# Patient Record
Sex: FEMALE | Race: WHITE | HISPANIC OR LATINO | Employment: FULL TIME | URBAN - METROPOLITAN AREA
[De-identification: names, ages, dates, MRNs, and addresses within clinical notes are randomized per-mention and may not be internally consistent; named-entity substitution may affect disease eponyms.]

---

## 2017-01-23 ENCOUNTER — OFFICE VISIT (OUTPATIENT)
Dept: MEDICAL GROUP | Facility: CLINIC | Age: 27
End: 2017-01-23
Payer: COMMERCIAL

## 2017-01-23 VITALS
OXYGEN SATURATION: 95 % | HEART RATE: 80 BPM | DIASTOLIC BLOOD PRESSURE: 72 MMHG | HEIGHT: 62 IN | WEIGHT: 213 LBS | RESPIRATION RATE: 14 BRPM | BODY MASS INDEX: 39.2 KG/M2 | SYSTOLIC BLOOD PRESSURE: 122 MMHG | TEMPERATURE: 98.4 F

## 2017-01-23 DIAGNOSIS — S90.425A: ICD-10-CM

## 2017-01-23 PROCEDURE — 99213 OFFICE O/P EST LOW 20 MIN: CPT | Performed by: FAMILY MEDICINE

## 2017-01-23 NOTE — PROGRESS NOTES
CC: Blister of toe    HPI:   Minnie presents today with the following.    1. Blister of toe without infection, left, initial encounter  She noticed a blister on Friday. She was walking a little more than usual. Not exactly sure how this began. There was a lot of pressure from the blister. Friday evening the pressure became very uncomfortable and she went ahead and open the blister using a needle and then peeled off the dead skin. Discussed with her that in the future it would be much better to leave the dead skin on as a natural bandage. She had a little bit of milky fluid. The next day there was even more milky fluid. Today the fluid is more clear that the area is still quite tender. She notes no other blisters.      Patient Active Problem List    Diagnosis Date Noted   • Thyromegaly 12/20/2016   • Tension headache 12/20/2016   • Obesity (BMI 35.0-39.9 without comorbidity) ([facility]) 09/29/2016   • Left-sided low back pain with left-sided sciatica 10/02/2015   • Seasonal allergies 09/26/2014   • PCOS (Polycystic Ovarian Syndrome) 12/15/2009       Current Outpatient Prescriptions   Medication Sig Dispense Refill   • tizanidine (ZANAFLEX) 4 MG Tab Take 1 Tab by mouth every bedtime. 30 Tab 3   • amitriptyline (ELAVIL) 25 MG Tab Take 1 Tab by mouth every bedtime. 30 Tab 3   • fluticasone (FLONASE) 50 MCG/ACT nasal spray Spray 2 Sprays in nose every day. 16 g 11     No current facility-administered medications for this visit.         Allergies as of 01/23/2017   • (No Known Allergies)        ROS: As per HPI.    There were no vitals taken for this visit.    Physical Exam:  Gen:         Alert and oriented, No apparent distress.  Lucid and fluent.  Neck:       supple  CV:          Regular rate and rhythm. No murmurs, rubs or gallops.               Ext:          No clubbing, cyanosis, edema.  Left foot 2nd toe 1 cm erythematous weeping blister, open.  Very mild surrounding erythema.  No purulence or edema appreciated right  now.      Assessment and Plan.   26 y.o. female with the following issues.    1. Blister of toe without infection, left, initial encounter  She is instructed to apply a bandaid to the region bid with bactroban.  - mupirocin (BACTROBAN) 2 % Ointment; Apply to affected region bid  Dispense: 1 Tube; Refill: 0

## 2017-01-23 NOTE — MR AVS SNAPSHOT
Minnie Memejeffrey Huffman   2017 11:20 AM   Office Visit   MRN: [id number]    Department:  [facility]   Dept Phone:  [phone]    Description:  Female : 1990   Provider:  Ramona Gaspar M.D.           Reason for Visit     Blister Left toe blister      Allergies as of 2017     No Known Allergies      You were diagnosed with     Blister of toe without infection, left, initial encounter   [[id number]]         Vital Signs     Smoking Status                   Never Smoker            Basic Information     Date Of Birth Sex Race Ethnicity Preferred Language    1990 Female Other  Origin (Hungarian,Belarusian,East Timorese,Cape Verdean, etc) English      Problem List              ICD-10-CM Priority Class Noted - Resolved    PCOS (Polycystic Ovarian Syndrome) (Chronic) E28.2   12/15/2009 - Present    Seasonal allergies J30.2   2014 - Present    Left-sided low back pain with left-sided sciatica M54.42   10/2/2015 - Present    Obesity (BMI 35.0-39.9 without comorbidity) (HCC) E66.9   2016 - Present    Thyromegaly E04.9   2016 - Present    Tension headache G44.209   2016 - Present      Health Maintenance        Date Due Completion Dates    IMM HEP B VACCINE (3 of 3 - Primary Series) 2022 (Originally 2016) 2016, 2016, 2015, 2014    IMM HEP A VACCINE (2 of 2 - Standard Series) 2016, 2016, 2016    PAP SMEAR 2018 (Done)    Override on 2015: Done    IMM DTaP/Tdap/Td Vaccine (7 - Td) 2023, 10/29/2012, 3/22/1994, 1990, 1989, 1989, 1989            Current Immunizations     Dtap Vaccine 3/22/1994, 1990, 1989, 1989, 1989    HIB Vaccine(PEDVAX) 1990    Hep A/HEP B Combined Vaccine (TwinRix) 2016    Hepatitis A Vaccine, Adult 2016, 2016    Hepatitis B Vaccine Recombivax (Adol/Adult) 2016, 2015, 2014    Influenza TIV (IM)  1/7/2013, 10/29/2012  6:00 AM, 11/16/2011    Influenza Vaccine Quad Inj (Pf) 10/5/2015 12:09 PM, 11/12/2014    Influenza Vaccine Quad Inj (Preserved) 11/8/2016    MMR Vaccine 10/1/2014, 3/22/1994, 6/29/1990    OPV - Historical Data 3/22/1994, 6/29/1990, 1989, 1989    Tdap Vaccine 1/7/2013, 10/29/2012  6:00 AM, 10/29/2012    Tuberculin Skin Test 11/25/2014  8:40 AM      Below and/or attached are the medications your provider expects you to take. Review all of your home medications and newly ordered medications with your provider and/or pharmacist. Follow medication instructions as directed by your provider and/or pharmacist. Please keep your medication list with you and share with your provider. Update the information when medications are discontinued, doses are changed, or new medications (including over-the-counter products) are added; and carry medication information at all times in the event of emergency situations     Allergies:  No Known Allergies          Medications  Valid as of: January 23, 2017 -  1:09 PM    Generic Name Brand Name Tablet Size Instructions for use    Amitriptyline HCl (Tab) ELAVIL 25 MG Take 1 Tab by mouth every bedtime.        Fluticasone Propionate (Suspension) FLONASE 50 MCG/ACT Spray 2 Sprays in nose every day.        Mupirocin (Ointment) BACTROBAN 2 % Apply to affected region bid        TiZANidine HCl (Tab) ZANAFLEX 4 MG Take 1 Tab by mouth every bedtime.        .                 Medicines prescribed today were sent to:     [facility] [address] [place] [address] (), [place] - [address]    [address] [place] [address] () [place] [address]    Phone: [phone] Fax: [phone]    Open 24 Hours?: No      Medication refill instructions:       If your prescription bottle indicates you have medication refills left, it is not necessary to call your provider’s office. Please contact your pharmacy and they will refill your medication.    If your prescription bottle indicates you do not have  any refills left, you may request refills at any time through one of the following ways: The online [facility] system (except Urgent Care), by calling your provider’s office, or by asking your pharmacy to contact your provider’s office with a refill request. Medication refills are processed only during regular business hours and may not be available until the next business day. Your provider may request additional information or to have a follow-up visit with you prior to refilling your medication.   *Please Note: Medication refills are assigned a new Rx number when refilled electronically. Your pharmacy may indicate that no refills were authorized even though a new prescription for the same medication is available at the pharmacy. Please request the medicine by name with the pharmacy before contacting your provider for a refill.           [facility] Access Code: Activation code not generated  Current [facility] Status: Active

## 2017-02-28 ENCOUNTER — HOSPITAL ENCOUNTER (OUTPATIENT)
Dept: LAB | Facility: MEDICAL CENTER | Age: 27
End: 2017-02-28
Attending: FAMILY MEDICINE
Payer: COMMERCIAL

## 2017-02-28 LAB
25(OH)D3 SERPL-MCNC: 10 NG/ML (ref 30–100)
ALBUMIN SERPL BCP-MCNC: 4.3 G/DL (ref 3.2–4.9)
ALBUMIN/GLOB SERPL: 1.2 G/DL
ALP SERPL-CCNC: 59 U/L (ref 30–99)
ALT SERPL-CCNC: 13 U/L (ref 2–50)
ANION GAP SERPL CALC-SCNC: 12 MMOL/L (ref 0–11.9)
AST SERPL-CCNC: 16 U/L (ref 12–45)
BILIRUB SERPL-MCNC: 0.4 MG/DL (ref 0.1–1.5)
BUN SERPL-MCNC: 15 MG/DL (ref 8–22)
CALCIUM SERPL-MCNC: 9.3 MG/DL (ref 8.5–10.5)
CHLORIDE SERPL-SCNC: 105 MMOL/L (ref 96–112)
CO2 SERPL-SCNC: 21 MMOL/L (ref 20–33)
CREAT SERPL-MCNC: 0.67 MG/DL (ref 0.5–1.4)
ERYTHROCYTE [DISTWIDTH] IN BLOOD BY AUTOMATED COUNT: 43.5 FL (ref 35.9–50)
GLOBULIN SER CALC-MCNC: 3.6 G/DL (ref 1.9–3.5)
GLUCOSE SERPL-MCNC: 88 MG/DL (ref 65–99)
HCT VFR BLD AUTO: 43.5 % (ref 37–47)
HGB BLD-MCNC: 14.8 G/DL (ref 12–16)
MCH RBC QN AUTO: 30.8 PG (ref 27–33)
MCHC RBC AUTO-ENTMCNC: 34 G/DL (ref 33.6–35)
MCV RBC AUTO: 90.6 FL (ref 81.4–97.8)
PLATELET # BLD AUTO: 440 K/UL (ref 164–446)
PMV BLD AUTO: 9.4 FL (ref 9–12.9)
POTASSIUM SERPL-SCNC: 3.9 MMOL/L (ref 3.6–5.5)
PROT SERPL-MCNC: 7.9 G/DL (ref 6–8.2)
RBC # BLD AUTO: 4.8 M/UL (ref 4.2–5.4)
SODIUM SERPL-SCNC: 138 MMOL/L (ref 135–145)
TSH SERPL DL<=0.005 MIU/L-ACNC: 2.86 UIU/ML (ref 0.3–3.7)
WBC # BLD AUTO: 8.9 K/UL (ref 4.8–10.8)

## 2017-02-28 PROCEDURE — 80061 LIPID PANEL: CPT

## 2017-02-28 PROCEDURE — 85027 COMPLETE CBC AUTOMATED: CPT

## 2017-02-28 PROCEDURE — 84443 ASSAY THYROID STIM HORMONE: CPT

## 2017-02-28 PROCEDURE — 83695 ASSAY OF LIPOPROTEIN(A): CPT

## 2017-02-28 PROCEDURE — 83704 LIPOPROTEIN BLD QUAN PART: CPT

## 2017-02-28 PROCEDURE — 36415 COLL VENOUS BLD VENIPUNCTURE: CPT

## 2017-02-28 PROCEDURE — 83036 HEMOGLOBIN GLYCOSYLATED A1C: CPT

## 2017-02-28 PROCEDURE — 82306 VITAMIN D 25 HYDROXY: CPT

## 2017-02-28 PROCEDURE — 80053 COMPREHEN METABOLIC PANEL: CPT

## 2017-03-01 LAB
EST. AVERAGE GLUCOSE BLD GHB EST-MCNC: 131 MG/DL
HBA1C MFR BLD: 6.2 % (ref 0–5.6)

## 2017-03-02 LAB — LPA SERPL-MCNC: 24 MG/DL

## 2017-03-03 LAB
CHOLEST SERPL-MCNC: 207 MG/DL (ref 100–199)
HDL PARTICAL NO Q4363: 22.1 UMOL/L
HDL SERPL QN: 8.6 NM
HDLC SERPL-MCNC: 33 MG/DL
HLD.LARGE SERPL-SCNC: 1.9 UMOL/L
LDL MED SERPL QN: 21 NM
LDL SERPL QN: 21 NM
LDL SERPL-SCNC: 1992 NMOL/L
LDL SMALL SERPL-SCNC: 1020 NMOL/L
LDL SMALL SERPL-SCNC: 1020 NMOL/L
LDLC SERPL CALC-MCNC: 142 MG/DL (ref 0–99)
LP IR SCORE Q4364: 72
TRIGL SERPL-MCNC: 160 MG/DL (ref 0–149)
VLDL LARGE SERPL-SCNC: 4.3 NMOL/L
VLDL SERPL QN: 45.7 NM

## 2017-03-06 ENCOUNTER — PATIENT MESSAGE (OUTPATIENT)
Dept: MEDICAL GROUP | Facility: CLINIC | Age: 27
End: 2017-03-06

## 2017-05-31 ENCOUNTER — OFFICE VISIT (OUTPATIENT)
Dept: MEDICAL GROUP | Facility: CLINIC | Age: 27
End: 2017-05-31
Payer: COMMERCIAL

## 2017-05-31 VITALS
WEIGHT: 213 LBS | TEMPERATURE: 99 F | SYSTOLIC BLOOD PRESSURE: 130 MMHG | DIASTOLIC BLOOD PRESSURE: 80 MMHG | OXYGEN SATURATION: 97 % | BODY MASS INDEX: 39.2 KG/M2 | RESPIRATION RATE: 14 BRPM | HEIGHT: 62 IN | HEART RATE: 84 BPM

## 2017-05-31 DIAGNOSIS — R73.02 IGT (IMPAIRED GLUCOSE TOLERANCE): ICD-10-CM

## 2017-05-31 DIAGNOSIS — J04.0 LARYNGITIS: ICD-10-CM

## 2017-05-31 DIAGNOSIS — E66.9 OBESITY (BMI 35.0-39.9 WITHOUT COMORBIDITY): ICD-10-CM

## 2017-05-31 PROCEDURE — 99214 OFFICE O/P EST MOD 30 MIN: CPT | Performed by: NURSE PRACTITIONER

## 2017-05-31 RX ORDER — AZITHROMYCIN 250 MG/1
TABLET, FILM COATED ORAL
Qty: 1 QUANTITY SUFFICIENT | Refills: 0 | Status: SHIPPED | OUTPATIENT
Start: 2017-05-31 | End: 2017-06-23

## 2017-05-31 RX ORDER — METHYLPREDNISOLONE 4 MG/1
TABLET ORAL
Qty: 21 TAB | Refills: 0 | Status: SHIPPED | OUTPATIENT
Start: 2017-05-31 | End: 2017-06-23

## 2017-05-31 NOTE — Clinical Note
May 31, 2017         Patient: Minnie Huffman   YOB: 1990   Date of Visit: 5/31/2017           To Whom it May Concern:    Minnie Huffman was seen in my clinic on 5/31/2017. She may return to work on 6/2/17.    If you have any questions or concerns, please don't hesitate to call.        Sincerely,           NAPOLEON Trammell.  Electronically Signed

## 2017-05-31 NOTE — MR AVS SNAPSHOT
"        Minnie Barros-Wagner   2017 12:00 PM   Office Visit   MRN: [id number]    Department:  [facility]   Dept Phone:  [phone]    Description:  Female : 1990   Provider:  ALMITA Trammell           Reason for Visit     Cough Pt was mowing lawn saturday & had allergy issues; then sore throat, cough with green mucous, chills, and loss of voice started Monday. Chest pain /pressure like someone sitting on chest. Will need note for work.       Allergies as of 2017     No Known Allergies      You were diagnosed with     Laryngitis   [[id number]]       IGT (impaired glucose tolerance)   [[id number]]         Vital Signs     Blood Pressure Pulse Temperature Respirations Height Weight    130/80 mmHg 84 37.2 °C (99 °F) 14 1.575 m (5' 2.01\") 96.616 kg (213 lb)    Body Mass Index Oxygen Saturation Breastfeeding? Smoking Status          38.95 kg/m2 97% No Never Smoker         Basic Information     Date Of Birth Sex Race Ethnicity Preferred Language    1990 Female Other  Origin (Ukrainian,Micronesian,Faroese,Fer, etc) English      Problem List              ICD-10-CM Priority Class Noted - Resolved    PCOS (Polycystic Ovarian Syndrome) (Chronic) E28.2   12/15/2009 - Present    Seasonal allergies J30.2   2014 - Present    Left-sided low back pain with left-sided sciatica M54.42   10/2/2015 - Present    Obesity (BMI 35.0-39.9 without comorbidity) (HCC) E66.9   2016 - Present    Thyromegaly E01.0   2016 - Present    Tension headache G44.209   2016 - Present    IGT (impaired glucose tolerance) R73.02   2017 - Present      Health Maintenance        Date Due Completion Dates    PAP SMEAR 2018 (Done)    Override on 2015: Done    IMM DTaP/Tdap/Td Vaccine (7 - Td) 2023, 10/29/2012, 3/22/1994, 1990, 1989, 1989, 1989            Current Immunizations     Dtap Vaccine 3/22/1994, 1990, 1989, 1989, " 1989    HIB Vaccine(PEDVAX) 6/29/1990    Hep A/HEP B Combined Vaccine (TwinRix) 9/29/2016    Hepatitis A Vaccine, Adult 12/20/2016, 9/29/2016    Hepatitis B Vaccine Recombivax (Adol/Adult) 9/29/2016, 1/13/2015, 9/23/2014    Influenza TIV (IM) 1/7/2013, 10/29/2012  6:00 AM, 11/16/2011    Influenza Vaccine Quad Inj (Pf) 10/5/2015 12:09 PM, 11/12/2014    Influenza Vaccine Quad Inj (Preserved) 11/8/2016    MMR Vaccine 10/1/2014, 3/22/1994, 6/29/1990    OPV - Historical Data 3/22/1994, 6/29/1990, 1989, 1989    Tdap Vaccine 1/7/2013, 10/29/2012  6:00 AM, 10/29/2012    Tuberculin Skin Test 11/25/2014  8:40 AM      Below and/or attached are the medications your provider expects you to take. Review all of your home medications and newly ordered medications with your provider and/or pharmacist. Follow medication instructions as directed by your provider and/or pharmacist. Please keep your medication list with you and share with your provider. Update the information when medications are discontinued, doses are changed, or new medications (including over-the-counter products) are added; and carry medication information at all times in the event of emergency situations     Allergies:  No Known Allergies          Medications  Valid as of: May 31, 2017 - 12:17 PM    Generic Name Brand Name Tablet Size Instructions for use    Amitriptyline HCl (Tab) ELAVIL 25 MG Take 1 Tab by mouth every bedtime.        Azithromycin (Tab) ZITHROMAX 250 MG Per packet        Fluticasone Propionate (Suspension) FLONASE 50 MCG/ACT Spray 2 Sprays in nose every day.        MethylPREDNISolone (Tablet Therapy Pack) MEDROL DOSEPAK 4 MG As directed on the packaging label.        Mupirocin (Ointment) BACTROBAN 2 % Apply to affected region bid        TiZANidine HCl (Tab) ZANAFLEX 4 MG Take 1 Tab by mouth every bedtime.        .                 Medicines prescribed today were sent to:     [facility] [address] [place] [address] (), [place] - [address]    [address] [place] () [place] [address]    Phone: [phone] Fax: [phone]    Open 24 Hours?: No      Medication refill instructions:       If your prescription bottle indicates you have medication refills left, it is not necessary to call your provider’s office. Please contact your pharmacy and they will refill your medication.    If your prescription bottle indicates you do not have any refills left, you may request refills at any time through one of the following ways: The online [facility] system (except Urgent Care), by calling your provider’s office, or by asking your pharmacy to contact your provider’s office with a refill request. Medication refills are processed only during regular business hours and may not be available until the next business day. Your provider may request additional information or to have a follow-up visit with you prior to refilling your medication.   *Please Note: Medication refills are assigned a new Rx number when refilled electronically. Your pharmacy may indicate that no refills were authorized even though a new prescription for the same medication is available at the pharmacy. Please request the medicine by name with the pharmacy before contacting your provider for a refill.           [facility] Access Code: Activation code not generated  Current [facility] Status: Active

## 2017-05-31 NOTE — PROGRESS NOTES
"CC: Cough        HPI:     Minnie presents today for the followin. Laryngitis  Patient states was mowing lawn on Saturday and started coughing. Since she's been losing her voice off and on since Monday. We've this is more related allergies. She's had a sore throat, cough intermittently but mostly dry occasionally however with some clear yellow or green mucus.       2. IGT (impaired glucose tolerance)  Wants to review labs that were done in March    3. Obesity (BMI 35.0-39.9 without comorbidity) ([facility])  Above ideal weight for height    Current Outpatient Prescriptions   Medication Sig Dispense Refill   • MethylPREDNISolone (MEDROL DOSEPAK) 4 MG Tablet Therapy Pack As directed on the packaging label. 21 Tab 0   • azithromycin (ZITHROMAX) 250 MG Tab Per packet 1 Quantity Sufficient 0   • mupirocin (BACTROBAN) 2 % Ointment Apply to affected region bid 1 Tube 0   • tizanidine (ZANAFLEX) 4 MG Tab Take 1 Tab by mouth every bedtime. 30 Tab 3   • amitriptyline (ELAVIL) 25 MG Tab Take 1 Tab by mouth every bedtime. 30 Tab 3   • fluticasone (FLONASE) 50 MCG/ACT nasal spray Spray 2 Sprays in nose every day. 16 g 11     No current facility-administered medications for this visit.     Social History   Substance Use Topics   • Smoking status: Never Smoker    • Smokeless tobacco: Never Used   • Alcohol Use: No     I reviewed patients allergies, problem list and medications today in [facility].    ROS: Any/all pertinent positives listed in the HPI, otherwise all others reviewed are negative today.      /80 mmHg  Pulse 84  Temp(Src) 37.2 °C (99 °F)  Resp 14  Ht 1.575 m (5' 2.01\")  Wt 96.616 kg (213 lb)  BMI 38.95 kg/m2  SpO2 97%  Breastfeeding? No    Exam:   Gen: Alert and oriented, No apparent distress. WDWN  Psych: A+Ox3, normal affect and mood  Skin: Warm, dry and intact. Good turgor   No rashes in visible areas.  Eye: Conjunctiva clear, lids normal  ENMT: Lips without lesions, good dentition   Oropharynx clear.  TMs " unremarkable bilaterally. Mild erythema bilateral nasal turbinates. No pain with pressure over the frontal or maxillary sinuses bilaterally.  Neck: No Lymphadenopathy, Thyromegaly, Bruits.   Trachea midline, no masses  Lungs: Clear to auscultation bilaterally, no rales or rhonchi   Unlabored respiratory effort.   CV: Regular rate and rhythm, S1, S2. No murmurs.   No Edema   No voice    Assessment and Plan.   27 y.o. female with the following issues.    1. Laryngitis  Stable. She will restart Flonase. Discussed korina oJnes had a use this. Medrol Dosepak. Z-Javy for coverage. Note written for work. Continue antihistamine  - MethylPREDNISolone (MEDROL DOSEPAK) 4 MG Tablet Therapy Pack; As directed on the packaging label.  Dispense: 21 Tab; Refill: 0  - azithromycin (ZITHROMAX) 250 MG Tab; Per packet  Dispense: 1 Quantity Sufficient; Refill: 0    2. IGT (impaired glucose tolerance)  High risk for DM, with siblings and family with DM.  Diet discussed.    3. Obesity (BMI 35.0-39.9 without comorbidity) (HCC)  Diet was discussed  - Patient identified as having weight management issue.  Appropriate orders and counseling given.

## 2017-06-01 ENCOUNTER — PATIENT MESSAGE (OUTPATIENT)
Dept: MEDICAL GROUP | Facility: CLINIC | Age: 27
End: 2017-06-01

## 2017-06-01 RX ORDER — BENZONATATE 100 MG/1
100 CAPSULE ORAL 3 TIMES DAILY PRN
Qty: 60 CAP | Refills: 0 | Status: SHIPPED | OUTPATIENT
Start: 2017-06-01 | End: 2017-09-21 | Stop reason: SDUPTHER

## 2017-06-01 RX ORDER — CODEINE PHOSPHATE AND GUAIFENESIN 10; 100 MG/5ML; MG/5ML
5 SOLUTION ORAL EVERY 4 HOURS PRN
Qty: 150 ML | Refills: 0 | Status: SHIPPED
Start: 2017-06-01 | End: 2017-06-23

## 2017-06-01 NOTE — TELEPHONE ENCOUNTER
reviewed from state pharmacy database (nothing under her ID)-Medications found to be medically necessary/appropriate.

## 2017-06-23 ENCOUNTER — APPOINTMENT (OUTPATIENT)
Dept: RADIOLOGY | Facility: IMAGING CENTER | Age: 27
End: 2017-06-23
Attending: NURSE PRACTITIONER
Payer: COMMERCIAL

## 2017-06-23 ENCOUNTER — OFFICE VISIT (OUTPATIENT)
Dept: URGENT CARE | Facility: CLINIC | Age: 27
End: 2017-06-23
Payer: COMMERCIAL

## 2017-06-23 VITALS
DIASTOLIC BLOOD PRESSURE: 72 MMHG | SYSTOLIC BLOOD PRESSURE: 132 MMHG | OXYGEN SATURATION: 99 % | HEIGHT: 62 IN | BODY MASS INDEX: 39.2 KG/M2 | HEART RATE: 89 BPM | RESPIRATION RATE: 15 BRPM | WEIGHT: 213 LBS | TEMPERATURE: 97.9 F

## 2017-06-23 DIAGNOSIS — R05.9 COUGH: ICD-10-CM

## 2017-06-23 DIAGNOSIS — R05.8 POST-VIRAL COUGH SYNDROME: ICD-10-CM

## 2017-06-23 DIAGNOSIS — R06.2 WHEEZING: ICD-10-CM

## 2017-06-23 PROCEDURE — 94640 AIRWAY INHALATION TREATMENT: CPT | Performed by: NURSE PRACTITIONER

## 2017-06-23 PROCEDURE — 71020 DX-CHEST-2 VIEWS: CPT | Mod: TC | Performed by: NURSE PRACTITIONER

## 2017-06-23 PROCEDURE — 99214 OFFICE O/P EST MOD 30 MIN: CPT | Mod: 25 | Performed by: NURSE PRACTITIONER

## 2017-06-23 RX ORDER — CODEINE PHOSPHATE AND GUAIFENESIN 10; 100 MG/5ML; MG/5ML
5-10 SOLUTION ORAL
Qty: 120 ML | Refills: 0 | Status: SHIPPED | OUTPATIENT
Start: 2017-06-23 | End: 2017-08-17

## 2017-06-23 RX ORDER — IPRATROPIUM BROMIDE AND ALBUTEROL SULFATE 2.5; .5 MG/3ML; MG/3ML
3 SOLUTION RESPIRATORY (INHALATION) ONCE
Status: COMPLETED | OUTPATIENT
Start: 2017-06-23 | End: 2017-06-23

## 2017-06-23 RX ORDER — ALBUTEROL SULFATE 90 UG/1
2 AEROSOL, METERED RESPIRATORY (INHALATION) EVERY 6 HOURS PRN
Qty: 8.5 G | Refills: 0 | Status: SHIPPED | OUTPATIENT
Start: 2017-06-23 | End: 2018-05-19

## 2017-06-23 RX ADMIN — IPRATROPIUM BROMIDE AND ALBUTEROL SULFATE 3 ML: 2.5; .5 SOLUTION RESPIRATORY (INHALATION) at 18:00

## 2017-06-23 NOTE — MR AVS SNAPSHOT
"        Minnie Barros-Wagner   2017 5:00 PM   Office Visit   MRN: [id number]    Department:  [facility]   Dept Phone:  [phone]    Description:  Female : 1990   Provider:  ALMITA Thurston           Reason for Visit     Cough w/SOB x 2 weeks ago s/p previous dx of \"bronchitis\"      Allergies as of 2017     No Known Allergies      You were diagnosed with     Post-viral cough syndrome   [426488]       Wheezing   [786.07.ICD-9-CM]         Vital Signs     Blood Pressure Pulse Temperature Respirations Height Weight    132/72 mmHg 89 36.6 °C (97.9 °F) 15 1.575 m (5' 2.01\") 96.616 kg (213 lb)    Body Mass Index Oxygen Saturation Breastfeeding? Smoking Status          38.95 kg/m2 99% No Never Smoker         Basic Information     Date Of Birth Sex Race Ethnicity Preferred Language    1990 Female Other  Origin (Thai,Lebanese,Macanese,Beninese, etc) English      Problem List              ICD-10-CM Priority Class Noted - Resolved    PCOS (Polycystic Ovarian Syndrome) (Chronic) E28.2   12/15/2009 - Present    Seasonal allergies J30.2   2014 - Present    Left-sided low back pain with left-sided sciatica M54.42   10/2/2015 - Present    Obesity (BMI 35.0-39.9 without comorbidity) (HCC) E66.9   2016 - Present    Thyromegaly E01.0   2016 - Present    Tension headache G44.209   2016 - Present    IGT (impaired glucose tolerance) R73.02   2017 - Present      Health Maintenance        Date Due Completion Dates    PAP SMEAR 2018 (Done)    Override on 2015: Done    IMM DTaP/Tdap/Td Vaccine (7 - Td) 2023, 10/29/2012, 3/22/1994, 1990, 1989, 1989, 1989            Current Immunizations     Dtap Vaccine 3/22/1994, 1990, 1989, 1989, 1989    HIB Vaccine(PEDVAX) 1990    Hep A/HEP B Combined Vaccine (TwinRix) 2016    Hepatitis A Vaccine, Adult 2016, 2016    Hepatitis B " Vaccine Recombivax (Adol/Adult) 9/29/2016, 1/13/2015, 9/23/2014    Influenza TIV (IM) 1/7/2013, 10/29/2012  6:00 AM, 11/16/2011    Influenza Vaccine Quad Inj (Pf) 10/5/2015 12:09 PM, 11/12/2014    Influenza Vaccine Quad Inj (Preserved) 11/8/2016    MMR Vaccine 10/1/2014, 3/22/1994, 6/29/1990    OPV - Historical Data 3/22/1994, 6/29/1990, 1989, 1989    Tdap Vaccine 1/7/2013, 10/29/2012  6:00 AM, 10/29/2012    Tuberculin Skin Test 11/25/2014  8:40 AM      Below and/or attached are the medications your provider expects you to take. Review all of your home medications and newly ordered medications with your provider and/or pharmacist. Follow medication instructions as directed by your provider and/or pharmacist. Please keep your medication list with you and share with your provider. Update the information when medications are discontinued, doses are changed, or new medications (including over-the-counter products) are added; and carry medication information at all times in the event of emergency situations     Allergies:  No Known Allergies          Medications  Valid as of: June 23, 2017 -  6:41 PM    Generic Name Brand Name Tablet Size Instructions for use    Albuterol Sulfate (Aero Soln) albuterol 108 (90 BASE) MCG/ACT Inhale 2 Puffs by mouth every 6 hours as needed for Shortness of Breath.        Benzonatate (Cap) TESSALON 100 MG Take 1 Cap by mouth 3 times a day as needed for Cough.        Fluticasone Propionate (Suspension) FLONASE 50 MCG/ACT Spray 2 Sprays in nose every day.        Guaifenesin-Codeine (Solution) ROBITUSSIN -10 mg/5mL Take 5-10 mL by mouth at bedtime as needed.        .                 Medicines prescribed today were sent to:     [facility] [address] [place] [address] (), [place] - [address]    [address] () [place] [address]    Phone: [phone] Fax: [phone]    Open 24 Hours?: No      Medication refill instructions:       If your prescription bottle indicates you  have medication refills left, it is not necessary to call your provider’s office. Please contact your pharmacy and they will refill your medication.    If your prescription bottle indicates you do not have any refills left, you may request refills at any time through one of the following ways: The online [facility] system (except Urgent Care), by calling your provider’s office, or by asking your pharmacy to contact your provider’s office with a refill request. Medication refills are processed only during regular business hours and may not be available until the next business day. Your provider may request additional information or to have a follow-up visit with you prior to refilling your medication.   *Please Note: Medication refills are assigned a new Rx number when refilled electronically. Your pharmacy may indicate that no refills were authorized even though a new prescription for the same medication is available at the pharmacy. Please request the medicine by name with the pharmacy before contacting your provider for a refill.        Your To Do List     Future Labs/Procedures Complete By Expires    DX-CHEST-2 VIEWS  As directed 6/23/2018         [facility] Access Code: Activation code not generated  Current [facility] Status: Active

## 2017-06-24 NOTE — PROGRESS NOTES
"Chief Complaint   Patient presents with   • Cough     w/SOB x 2 weeks ago s/p previous dx of \"bronchitis\"       HISTORY OF PRESENT ILLNESS: Patient is a 27 y.o. female who presents today due to symptoms that started one month ago. Pt reports a dry cough without blood in sputum. Reports associated mild sore throat, nasal congestion, wheezing. sinus pressure, fever, and body aches. Denies fever, chills, body aches, chest pain, shortness of breath. Denies h/o asthma/copd/CAP. Admits to history of seasonal allergies. No immunocompromise. Has tried OTC cold medications without significant relief of symptoms. The patient was seen by her PCP three weeks ago, was given medrol dose pack and z-pack with some improvement. States cough is still bothersome. No other aggravating or alleviating factors.       Patient Active Problem List    Diagnosis Date Noted   • IGT (impaired glucose tolerance) 05/31/2017   • Thyromegaly 12/20/2016   • Tension headache 12/20/2016   • Obesity (BMI 35.0-39.9 without comorbidity) ([facility]) 09/29/2016   • Left-sided low back pain with left-sided sciatica 10/02/2015   • Seasonal allergies 09/26/2014   • PCOS (Polycystic Ovarian Syndrome) 12/15/2009       Allergies:Review of patient's allergies indicates no known allergies.    Current Outpatient Prescriptions Ordered in [facility]   Medication Sig Dispense Refill   • guaifenesin-codeine (ROBITUSSIN AC) Solution oral solution Take 5-10 mL by mouth at bedtime as needed. 120 mL 0   • albuterol 108 (90 BASE) MCG/ACT Aero Soln inhalation aerosol Inhale 2 Puffs by mouth every 6 hours as needed for Shortness of Breath. 8.5 g 0   • benzonatate (TESSALON) 100 MG Cap Take 1 Cap by mouth 3 times a day as needed for Cough. 60 Cap 0   • fluticasone (FLONASE) 50 MCG/ACT nasal spray Spray 2 Sprays in nose every day. 16 g 11     No current [facility]-ordered facility-administered medications on file.       Past Medical History   Diagnosis Date   • PCOS (polycystic ovarian syndrome) " "12/15/2009   • History of chickenpox 2011     developed this at 21   • Frequent headaches        Social History   Substance Use Topics   • Smoking status: Never Smoker    • Smokeless tobacco: Never Used   • Alcohol Use: No       No family status information on file.     Family History   Problem Relation Age of Onset   • Diabetes Mother    • Diabetes Father    • Diabetes Brother    • Hypertension Father    • Heart Disease Father    • Stroke Paternal Grandmother    • Thyroid Mother    • Other Sister      occipital headaches       ROS:  Review of Systems   Constitutional: Negative for subjective fever, chills, fatigue, weight loss and malaise.  HENT: Positive for congestion and sore throat. Negative for ear pain, nosebleeds, and neck pain.    Eyes: Negative for vision changes.   Cardiovascular: Negative for chest pain, palpitations, orthopnea and leg swelling.   Respiratory: Positive for cough without sputum production, wheezing. Negative for shortness of breath.  Gastrointestinal: Negative for abdominal pain, nausea, vomiting or diarrhea.   Skin: Negative for rash, diaphoresis.     Exam:  Blood pressure 132/72, pulse 89, temperature 36.6 °C (97.9 °F), resp. rate 15, height 1.575 m (5' 2.01\"), weight 96.616 kg (213 lb), SpO2 99 %, not currently breastfeeding.  General: well-nourished, well-developed female in NAD  Head: normocephalic, atraumatic  Eyes: PERRLA, EOM within normal limits, no conjunctival injection, no scleral icterus, visual fields and acuity grossly intact.  Ears: normal shape and symmetry, no tenderness, no discharge. External canals are without any significant edema or erythema. Tympanic membranes are without any inflammation, no effusion. Gross auditory acuity is intact.  Nose: symmetrical without tenderness, mild discharge, erythema present bilateral nares.  Mouth/Throat: reasonable hygiene, no exudates or tonsillar enlargement. Erythema present.   Neck: no masses, range of motion within normal " "limits, no tracheal deviation.  Lymph: mild cervical adenopathy. No supraclavicular adenopathy.   Neuro: alert and oriented. Cranial nerves 1-12 grossly intact.   Cardiovascular: regular rate and rhythm without murmurs, rubs, or gallops. No edema.   Pulmonary: no distress. Chest is symmetrical with respiration, no wheezes, crackles, or rhonchi.   Musculoskeletal: appropriate muscle tone, gait is stable.  Skin: warm, dry, intact, no clubbing, no cyanosis.   Psych: appropriate mood, affect, judgement.         Assessment/Plan:  1. Post-viral cough syndrome  DX-CHEST-2 VIEWS    guaifenesin-codeine (ROBITUSSIN AC) Solution oral solution   2. Wheezing  ipratropium-albuterol (DUONEB) nebulizer solution 3 mL    DX-CHEST-2 VIEWS    albuterol 108 (90 BASE) MCG/ACT Aero Soln inhalation aerosol       DX chest reviewed by myself, radiology reading \"Negative two views of the chest.\"    Discussed that I felt their symptoms had either allergic or post infection etiologies. Did not see any evidence of a bacterial process. Discussed natural progression and sx care. She was given a duo neb in clinic with improvement of symptoms.   Albuterol inhaler as needed. Robitussin AC for cough, sedative effects of medication discussed with patient.   Rest, increase fluids, hand and respiratory hygiene. May take OTC allergy as directed for symptom relief.   Supportive care, differential diagnoses, and indications for immediate follow-up discussed with patient.   Pathogenesis of diagnosis discussed including typical length and natural progression.  Instructed to return to clinic or nearest emergency department for any change in condition, further concerns, or worsening of symptoms.  Patient states understanding of the plan of care and discharge instructions.  Instructed to make an appointment with their primary care provider in the next 3-7 days if not significantly improving and for further care.         Please note that this dictation was " created using voice recognition software. I have made every reasonable attempt to correct obvious errors, but I expect that there are errors of grammar and possibly content that I did not discover before finalizing the note.      NAPOLEON Gamez.

## 2017-08-12 ENCOUNTER — HOSPITAL ENCOUNTER (OUTPATIENT)
Dept: LAB | Facility: MEDICAL CENTER | Age: 27
End: 2017-08-12
Attending: FAMILY MEDICINE
Payer: COMMERCIAL

## 2017-08-12 DIAGNOSIS — E01.0 THYROMEGALY: ICD-10-CM

## 2017-08-12 LAB
T4 FREE SERPL-MCNC: 0.79 NG/DL (ref 0.53–1.43)
TSH SERPL DL<=0.005 MIU/L-ACNC: 3.23 UIU/ML (ref 0.3–3.7)

## 2017-08-12 PROCEDURE — 36415 COLL VENOUS BLD VENIPUNCTURE: CPT

## 2017-08-12 PROCEDURE — 84443 ASSAY THYROID STIM HORMONE: CPT

## 2017-08-12 PROCEDURE — 84439 ASSAY OF FREE THYROXINE: CPT

## 2017-08-16 ENCOUNTER — PATIENT MESSAGE (OUTPATIENT)
Dept: MEDICAL GROUP | Facility: CLINIC | Age: 27
End: 2017-08-16

## 2017-08-17 ENCOUNTER — PATIENT MESSAGE (OUTPATIENT)
Dept: MEDICAL GROUP | Facility: CLINIC | Age: 27
End: 2017-08-17

## 2017-08-17 DIAGNOSIS — E01.0 THYROMEGALY: ICD-10-CM

## 2017-08-17 DIAGNOSIS — R53.82 CHRONIC FATIGUE: ICD-10-CM

## 2017-08-17 RX ORDER — LEVOTHYROXINE SODIUM 0.03 MG/1
25 TABLET ORAL
Qty: 30 TAB | Refills: 2 | Status: SHIPPED | OUTPATIENT
Start: 2017-08-17 | End: 2017-12-28 | Stop reason: SDUPTHER

## 2017-08-18 NOTE — TELEPHONE ENCOUNTER
From: Minnie Huffman  To: Ramona Gaspar M.D.  Sent: 8/16/2017 10:58 AM PDT  Subject: RE:thyroid results    Yes please. Even after I take my vitamin d vitamin and get 7-8hr sleep I still feel sluggish threw out the day.  ----- Message -----  From: Ramona Gaspar M.D.  Sent: 8/16/2017 10:54 AM PDT  To: Minnie Huffman  Subject: thyroid results    It is still technically in the normal range. It is not in the ideal range. If you are feeling tired we should consider adding thyroid supplementation.

## 2017-08-28 ENCOUNTER — OFFICE VISIT (OUTPATIENT)
Dept: MEDICAL GROUP | Facility: CLINIC | Age: 27
End: 2017-08-28
Payer: COMMERCIAL

## 2017-08-28 VITALS
HEART RATE: 86 BPM | WEIGHT: 214 LBS | TEMPERATURE: 97.8 F | RESPIRATION RATE: 14 BRPM | HEIGHT: 63 IN | OXYGEN SATURATION: 95 % | BODY MASS INDEX: 37.92 KG/M2 | SYSTOLIC BLOOD PRESSURE: 124 MMHG | DIASTOLIC BLOOD PRESSURE: 70 MMHG

## 2017-08-28 DIAGNOSIS — R73.02 IGT (IMPAIRED GLUCOSE TOLERANCE): ICD-10-CM

## 2017-08-28 DIAGNOSIS — B35.3 TINEA PEDIS OF BOTH FEET: ICD-10-CM

## 2017-08-28 DIAGNOSIS — E66.9 OBESITY (BMI 35.0-39.9 WITHOUT COMORBIDITY): ICD-10-CM

## 2017-08-28 DIAGNOSIS — L63.9 ALOPECIA AREATA: ICD-10-CM

## 2017-08-28 DIAGNOSIS — B07.8 COMMON WART: ICD-10-CM

## 2017-08-28 PROCEDURE — 99214 OFFICE O/P EST MOD 30 MIN: CPT | Performed by: NURSE PRACTITIONER

## 2017-08-28 RX ORDER — CLOBETASOL PROPIONATE 0.5 MG/G
1 OINTMENT TOPICAL 2 TIMES DAILY
Qty: 40 G | Refills: 2 | Status: SHIPPED | OUTPATIENT
Start: 2017-08-28 | End: 2018-05-19

## 2017-08-28 RX ORDER — KETOCONAZOLE 20 MG/G
1 CREAM TOPICAL DAILY
Qty: 1 TUBE | Refills: 5 | Status: SHIPPED | OUTPATIENT
Start: 2017-08-28 | End: 2018-05-19

## 2017-08-28 NOTE — PROGRESS NOTES
"CC: Alopecia (The other day was at hair stylists when a ton of hair fell out, more than normal. Notes bald spot in back of head. )        HPI:     Minnie is a patient of Dr. Manuel, presents today for the followin. Common wart/Tinea pedis of both feet  Complains of some red or white spots along with the rash bilateral feet. Also complains of wart that on her right foot between the big toe and now has spread to the second toe.  She has tried treating the rash with Lotrimin over-the-counter and a antifungal impregnated powder    3. Alopecia areata  States that her hair done yesterday and her  commented on a bald spot in the back of her head. She had been unaware and had not noticed any changes with her hair.    4. IGT (impaired glucose tolerance)  Again discussed with the patient that she has prediabetes with a family history of diabetes. She states she \"never received any education on this\". We did discuss this at her last appointment several months back.  She has previously seen a nutritionist to the ring out system but states she didn't feel that it was helpful enough and there wasn't a follow-up to help her with her diet and exercise      5. Obesity (BMI 35.0-39.9 without comorbidity) (HCC)  She currently does not exercise consistently. She struggles with appropriate diet. She does state that when she works hard with diet and exercise for maybe lose 5 pounds over 2 months. She currently is euthyroid    Current Outpatient Prescriptions   Medication Sig Dispense Refill   • ketoconazole (NIZORAL) 2 % Cream Apply 1 Application to affected area(s) every day. 1 Tube 5   • clobetasol (TEMOVATE) 0.05 % Ointment Apply 1 Application to affected area(s) 2 times a day. On scalp 40 g 2   • levothyroxine (SYNTHROID) 25 MCG Tab Take 1 Tab by mouth Every morning on an empty stomach. 30 Tab 2   • albuterol 108 (90 BASE) MCG/ACT Aero Soln inhalation aerosol Inhale 2 Puffs by mouth every 6 hours as needed for " "Shortness of Breath. 8.5 g 0   • benzonatate (TESSALON) 100 MG Cap Take 1 Cap by mouth 3 times a day as needed for Cough. 60 Cap 0   • fluticasone (FLONASE) 50 MCG/ACT nasal spray Spray 2 Sprays in nose every day. 16 g 11     No current facility-administered medications for this visit.      Social History   Substance Use Topics   • Smoking status: Never Smoker   • Smokeless tobacco: Never Used   • Alcohol use No     I reviewed patients allergies, problem list and medications today in EPIC.    ROS: Any/all pertinent positives listed in the HPI, otherwise all others reviewed are negative today.      /70   Pulse 86   Temp 36.6 °C (97.8 °F)   Resp 14   Ht 1.6 m (5' 3\")   Wt 97.1 kg (214 lb)   LMP 08/07/2017   SpO2 95%   Breastfeeding? No   BMI 37.91 kg/m²     Exam:    Gen: Alert and oriented, No apparent distress. WDWN  Psych: A+Ox3, normal affect and mood  Skin: Warm, dry and intact. Good turgor   No rashes in visible areas.  Eye: Conjunctiva clear, lids normal  ENMT: Lips without lesions, good dentition  Lungs: Unlabored respiratory effort.   Common wart noted on the right foot between the first and second digits.  She does have a generalized fungal appearing rash on both feet with some dryness peeling and red spots. The 2 areas that she comments on to be evaluated today are possible for very early plantar warts or possible side effect of her chronic tinea pedis.        Assessment and Plan.   27 y.o. female with the following issues.    1. Common wart  Stable. Discussed removal however office delivery of with the nitrogen is not coming until later today. We discussed she could do home treatment, I can send her to podiatry or she can follow-up in the office and have it treated at that time    2. Tinea pedis of both feet  Discussed appropriate foot care. Discussed trying to ketoconazole cream. We did discuss using moisture barriers with either changing socks or using an antifungal powder to prevent " humidity.  - ketoconazole (NIZORAL) 2 % Cream; Apply 1 Application to affected area(s) every day.  Dispense: 1 Tube; Refill: 5    3. Alopecia areata  Stable. Reassured the patient. Will try some topical steroids, we discussed if not improving or worsening we can send her to dermatology to evaluate for possible steroid injections in the local area.  - clobetasol (TEMOVATE) 0.05 % Ointment; Apply 1 Application to affected area(s) 2 times a day. On scalp  Dispense: 40 g; Refill: 2    4. IGT (impaired glucose tolerance)  Stable. Long discussion today with the importance of reducing sugars, reducing weight and exercising routinely . Referral was placed in the [facility] weight loss clinic    5. Obesity (BMI 35.0-39.9 without comorbidity) (HCC)  See above  - REFERRAL TO OTHER

## 2017-09-18 ENCOUNTER — HOSPITAL ENCOUNTER (EMERGENCY)
Facility: MEDICAL CENTER | Age: 27
End: 2017-09-18
Attending: EMERGENCY MEDICINE
Payer: COMMERCIAL

## 2017-09-18 ENCOUNTER — HOSPITAL ENCOUNTER (OUTPATIENT)
Dept: RADIOLOGY | Facility: MEDICAL CENTER | Age: 27
End: 2017-09-18
Attending: PHYSICIAN ASSISTANT
Payer: COMMERCIAL

## 2017-09-18 ENCOUNTER — OFFICE VISIT (OUTPATIENT)
Dept: URGENT CARE | Facility: CLINIC | Age: 27
End: 2017-09-18
Payer: COMMERCIAL

## 2017-09-18 ENCOUNTER — APPOINTMENT (OUTPATIENT)
Dept: RADIOLOGY | Facility: MEDICAL CENTER | Age: 27
End: 2017-09-18
Attending: EMERGENCY MEDICINE
Payer: COMMERCIAL

## 2017-09-18 VITALS
OXYGEN SATURATION: 97 % | HEIGHT: 64 IN | BODY MASS INDEX: 36.88 KG/M2 | DIASTOLIC BLOOD PRESSURE: 64 MMHG | WEIGHT: 216 LBS | RESPIRATION RATE: 16 BRPM | SYSTOLIC BLOOD PRESSURE: 112 MMHG | TEMPERATURE: 98.1 F | HEART RATE: 60 BPM

## 2017-09-18 VITALS
SYSTOLIC BLOOD PRESSURE: 122 MMHG | BODY MASS INDEX: 37.07 KG/M2 | OXYGEN SATURATION: 96 % | RESPIRATION RATE: 18 BRPM | HEIGHT: 64 IN | TEMPERATURE: 98.3 F | HEART RATE: 68 BPM | DIASTOLIC BLOOD PRESSURE: 73 MMHG | WEIGHT: 217.15 LBS

## 2017-09-18 DIAGNOSIS — R10.31 RLQ ABDOMINAL PAIN: ICD-10-CM

## 2017-09-18 DIAGNOSIS — R10.31 RIGHT GROIN PAIN: ICD-10-CM

## 2017-09-18 DIAGNOSIS — N83.201 CYST OF RIGHT OVARY: ICD-10-CM

## 2017-09-18 DIAGNOSIS — R10.2 PELVIC PAIN: ICD-10-CM

## 2017-09-18 DIAGNOSIS — E28.2 PCO (POLYCYSTIC OVARIES): ICD-10-CM

## 2017-09-18 LAB
ALBUMIN SERPL BCP-MCNC: 4.2 G/DL (ref 3.2–4.9)
ALBUMIN/GLOB SERPL: 1.4 G/DL
ALP SERPL-CCNC: 49 U/L (ref 30–99)
ALT SERPL-CCNC: 18 U/L (ref 2–50)
ANION GAP SERPL CALC-SCNC: 8 MMOL/L (ref 0–11.9)
APPEARANCE UR: CLEAR
APPEARANCE UR: CLEAR
AST SERPL-CCNC: 16 U/L (ref 12–45)
BACTERIA #/AREA URNS HPF: ABNORMAL /HPF
BASOPHILS # BLD AUTO: 0.6 % (ref 0–1.8)
BASOPHILS # BLD: 0.05 K/UL (ref 0–0.12)
BILIRUB SERPL-MCNC: 0.4 MG/DL (ref 0.1–1.5)
BILIRUB UR QL STRIP.AUTO: NEGATIVE
BILIRUB UR STRIP-MCNC: NORMAL MG/DL
BUN SERPL-MCNC: 13 MG/DL (ref 8–22)
CALCIUM SERPL-MCNC: 9.1 MG/DL (ref 8.4–10.2)
CHLORIDE SERPL-SCNC: 107 MMOL/L (ref 96–112)
CO2 SERPL-SCNC: 21 MMOL/L (ref 20–33)
COLOR UR AUTO: YELLOW
COLOR UR: YELLOW
CREAT SERPL-MCNC: 0.64 MG/DL (ref 0.5–1.4)
CULTURE IF INDICATED INDCX: NO UA CULTURE
EOSINOPHIL # BLD AUTO: 0.31 K/UL (ref 0–0.51)
EOSINOPHIL NFR BLD: 3.6 % (ref 0–6.9)
EPI CELLS #/AREA URNS HPF: ABNORMAL /HPF
ERYTHROCYTE [DISTWIDTH] IN BLOOD BY AUTOMATED COUNT: 43.6 FL (ref 35.9–50)
GFR SERPL CREATININE-BSD FRML MDRD: >60 ML/MIN/1.73 M 2
GLOBULIN SER CALC-MCNC: 2.9 G/DL (ref 1.9–3.5)
GLUCOSE SERPL-MCNC: 102 MG/DL (ref 65–99)
GLUCOSE UR STRIP.AUTO-MCNC: NEGATIVE MG/DL
GLUCOSE UR STRIP.AUTO-MCNC: NORMAL MG/DL
HCG SERPL QL: NEGATIVE
HCT VFR BLD AUTO: 39.4 % (ref 37–47)
HGB BLD-MCNC: 13.8 G/DL (ref 12–16)
IMM GRANULOCYTES # BLD AUTO: 0.02 K/UL (ref 0–0.11)
IMM GRANULOCYTES NFR BLD AUTO: 0.2 % (ref 0–0.9)
INT CON NEG: NEGATIVE
INT CON POS: POSITIVE
KETONES UR STRIP.AUTO-MCNC: NEGATIVE MG/DL
KETONES UR STRIP.AUTO-MCNC: NORMAL MG/DL
LEUKOCYTE ESTERASE UR QL STRIP.AUTO: NEGATIVE
LEUKOCYTE ESTERASE UR QL STRIP.AUTO: NORMAL
LIPASE SERPL-CCNC: 31 U/L (ref 7–58)
LYMPHOCYTES # BLD AUTO: 2.85 K/UL (ref 1–4.8)
LYMPHOCYTES NFR BLD: 33 % (ref 22–41)
MCH RBC QN AUTO: 31 PG (ref 27–33)
MCHC RBC AUTO-ENTMCNC: 35 G/DL (ref 33.6–35)
MCV RBC AUTO: 88.5 FL (ref 81.4–97.8)
MICRO URNS: ABNORMAL
MONOCYTES # BLD AUTO: 0.61 K/UL (ref 0–0.85)
MONOCYTES NFR BLD AUTO: 7.1 % (ref 0–13.4)
MUCOUS THREADS #/AREA URNS HPF: ABNORMAL /HPF
NEUTROPHILS # BLD AUTO: 4.8 K/UL (ref 2–7.15)
NEUTROPHILS NFR BLD: 55.5 % (ref 44–72)
NITRITE UR QL STRIP.AUTO: NEGATIVE
NITRITE UR QL STRIP.AUTO: NORMAL
NRBC # BLD AUTO: 0 K/UL
NRBC BLD AUTO-RTO: 0 /100 WBC
PH UR STRIP.AUTO: 5.5 [PH]
PH UR STRIP.AUTO: 6 [PH] (ref 5–8)
PLATELET # BLD AUTO: 451 K/UL (ref 164–446)
PMV BLD AUTO: 9.1 FL (ref 9–12.9)
POC URINE PREGNANCY TEST: NEGATIVE
POTASSIUM SERPL-SCNC: 4.1 MMOL/L (ref 3.6–5.5)
PROT SERPL-MCNC: 7.1 G/DL (ref 6–8.2)
PROT UR QL STRIP: NEGATIVE MG/DL
PROT UR QL STRIP: NORMAL MG/DL
RBC # BLD AUTO: 4.45 M/UL (ref 4.2–5.4)
RBC # URNS HPF: ABNORMAL /HPF
RBC UR QL AUTO: ABNORMAL
RBC UR QL AUTO: NORMAL
SODIUM SERPL-SCNC: 136 MMOL/L (ref 135–145)
SP GR UR STRIP.AUTO: 1.02
SP GR UR STRIP.AUTO: <=1.005
UROBILINOGEN UR STRIP-MCNC: 0.2 MG/DL
WBC # BLD AUTO: 8.6 K/UL (ref 4.8–10.8)
WBC #/AREA URNS HPF: ABNORMAL /HPF

## 2017-09-18 PROCEDURE — 81002 URINALYSIS NONAUTO W/O SCOPE: CPT | Performed by: PHYSICIAN ASSISTANT

## 2017-09-18 PROCEDURE — 99284 EMERGENCY DEPT VISIT MOD MDM: CPT

## 2017-09-18 PROCEDURE — 76830 TRANSVAGINAL US NON-OB: CPT

## 2017-09-18 PROCEDURE — 83690 ASSAY OF LIPASE: CPT

## 2017-09-18 PROCEDURE — 84703 CHORIONIC GONADOTROPIN ASSAY: CPT

## 2017-09-18 PROCEDURE — 76857 US EXAM PELVIC LIMITED: CPT

## 2017-09-18 PROCEDURE — 81025 URINE PREGNANCY TEST: CPT | Performed by: PHYSICIAN ASSISTANT

## 2017-09-18 PROCEDURE — 80053 COMPREHEN METABOLIC PANEL: CPT

## 2017-09-18 PROCEDURE — 74177 CT ABD & PELVIS W/CONTRAST: CPT

## 2017-09-18 PROCEDURE — 81001 URINALYSIS AUTO W/SCOPE: CPT

## 2017-09-18 PROCEDURE — 700117 HCHG RX CONTRAST REV CODE 255: Performed by: EMERGENCY MEDICINE

## 2017-09-18 PROCEDURE — 36415 COLL VENOUS BLD VENIPUNCTURE: CPT

## 2017-09-18 PROCEDURE — 85025 COMPLETE CBC W/AUTO DIFF WBC: CPT

## 2017-09-18 PROCEDURE — 99214 OFFICE O/P EST MOD 30 MIN: CPT | Performed by: PHYSICIAN ASSISTANT

## 2017-09-18 RX ORDER — TRAMADOL HYDROCHLORIDE 50 MG/1
50-100 TABLET ORAL EVERY 6 HOURS PRN
Qty: 25 TAB | Refills: 0 | Status: SHIPPED | OUTPATIENT
Start: 2017-09-18 | End: 2017-12-01 | Stop reason: SDUPTHER

## 2017-09-18 RX ADMIN — IOHEXOL 100 ML: 350 INJECTION, SOLUTION INTRAVENOUS at 20:39

## 2017-09-18 ASSESSMENT — ENCOUNTER SYMPTOMS
BACK PAIN: 0
VOMITING: 0
FATIGUE: 0
MYALGIAS: 0
PALPITATIONS: 0
COUGH: 0
HEADACHES: 0
FEVER: 0
ABDOMINAL PAIN: 1
CHILLS: 0
BLOOD IN STOOL: 0
CHANGE IN BOWEL HABIT: 0
ANOREXIA: 0
FLANK PAIN: 0
DIARRHEA: 0
NAUSEA: 0
SHORTNESS OF BREATH: 0

## 2017-09-18 NOTE — PROGRESS NOTES
Subjective:      Minnie Huffman is a 27 y.o. female who presents with Groin Swelling ((R) groin x 2 days)            Groin Pain   This is a new problem. Episode onset: 2 days. The problem occurs constantly. The problem has been gradually worsening. Associated symptoms include abdominal pain. Pertinent negatives include no anorexia, change in bowel habit, chest pain, chills, congestion, coughing, fatigue, fever, headaches, myalgias, nausea or vomiting. Associated symptoms comments: Right lower pelvic pain, lower abdominal pain . Exacerbated by: palpation. She has tried NSAIDs for the symptoms. The treatment provided mild relief.   The patient reports history of spotting a few days ago which is normal for her as she is on Nexplanon.     Past Medical History:   Diagnosis Date   • History of chickenpox 2011    developed this at 21   • PCOS (polycystic ovarian syndrome) 12/15/2009   • Frequent headaches      No past surgical history on file.    Family History   Problem Relation Age of Onset   • Diabetes Mother    • Thyroid Mother    • Diabetes Father    • Hypertension Father    • Heart Disease Father    • Diabetes Brother    • Stroke Paternal Grandmother    • Other Sister      occipital headaches       No Known Allergies    Medications, Allergies, and current problem list reviewed today in Epic    Review of Systems   Constitutional: Negative for chills, fatigue, fever and malaise/fatigue.   HENT: Negative for congestion.    Respiratory: Negative for cough and shortness of breath.    Cardiovascular: Negative for chest pain, palpitations and leg swelling.   Gastrointestinal: Positive for abdominal pain. Negative for anorexia, blood in stool, change in bowel habit, diarrhea, nausea and vomiting.   Genitourinary: Negative for dysuria, flank pain, frequency, hematuria and urgency.   Musculoskeletal: Negative for back pain and myalgias.   Neurological: Negative for headaches.     All other systems reviewed and  "are negative.        Objective:     /64   Pulse 60   Temp 36.7 °C (98.1 °F)   Resp 16   Ht 1.626 m (5' 4\")   Wt 98 kg (216 lb)   SpO2 97%   BMI 37.08 kg/m²      Physical Exam   Constitutional: She is oriented to person, place, and time. She appears well-developed. No distress.   obese   Eyes: Conjunctivae are normal.   Cardiovascular: Normal rate, regular rhythm and normal heart sounds.  Exam reveals no gallop and no friction rub.    No murmur heard.  Pulmonary/Chest: Effort normal and breath sounds normal. No respiratory distress. She has no wheezes. She has no rales.   Abdominal: Soft. Bowel sounds are normal. She exhibits no distension. There is tenderness in the right lower quadrant. There is no rigidity, no rebound, no guarding, no CVA tenderness and negative Barrientos's sign.       Neurological: She is alert and oriented to person, place, and time. No cranial nerve deficit.   Psychiatric: She has a normal mood and affect. Her behavior is normal. Judgment and thought content normal.             Lab Results   Component Value Date/Time    POCCOLOR Yellow 09/18/2017 01:45 PM    POCAPPEAR Clear 09/18/2017 01:45 PM    POCLEUKEST Neg 09/18/2017 01:45 PM    POCNITRITE Neg 09/18/2017 01:45 PM    POCUROBILIGE 0.2 09/18/2017 01:45 PM    POCPROTEIN Neg 09/18/2017 01:45 PM    POCURPH 6.0 09/18/2017 01:45 PM    POCBLOOD 2+ 09/18/2017 01:45 PM    POCSPGRV 1.025 09/18/2017 01:45 PM    POCKETONES Neg 09/18/2017 01:45 PM    POCBILIRUBIN Neg 09/18/2017 01:45 PM    POCGLUCUA Neg 09/18/2017 01:45 PM         pregnancy- negative     US- INGUINAL HERNIA  FINDINGS:  Body habitus factors limit the analysis    No inguinal or other hernia is detected.    No abnormal fluid collection    Attempts to visualize the pancreas are unsuccessful   Impression       No sonographic evidence of inguinal hernia or acute appendicitis    Limited by body habitus factors     9/18/2017 4:59 PM    HISTORY/REASON FOR EXAM:  Pain  Right groin pain. " Swelling.    TECHNIQUE/EXAM DESCRIPTION:  Transvaginal pelvic ultrasound.    COMPARISON:   None    FINDINGS:  Both transabdominal and transvaginal scanning was performed to optimally visualize the pelvis.    The uterus measures 3.58 cm x 7.85 cm x 4.53 cm.  The uterine myometrium is within normal limits.  The endometrial echo complex measures 0.48 cm.    Low resistive waveforms are confirmed within the ovaries.  The right ovary measures 2.86 cm x 3.05 cm x 2.94 cm.    The left ovary measures 2.99 cm x 2.35 cm x 2.79 cm.        Small volume free fluid within the posterior cul-de-sac.   Impression       No large ovarian cysts or adnexal masses identified.  Small pelvic free fluid.         Assessment/Plan:     1. RLQ abdominal pain     2. Right groin pain  POCT Urinalysis    POCT Pregnancy    US-GYN-PELVIS TRANSVAGINAL    US-INGUINAL HERNIA           - patient's pain is very low on her abdomen  which is an atypical presentation for appendicitis.  - US transvaginal negative for ovarian cyst  - US inguinal negative for inguinal hernia. Appendix not visualized.    - discussed findings with patient. Patient notes worsening pain since having US exams.  Explained that we cannot definitively rule out appendicitis with US studies. Suggested further Evaluation in the ER as I am not able to get a CT abdomen pelvis with contrast at this time.    - I feel appendicitis should be rule out due to her worsening pain and localized tenderness.     Patient verbalized understanding and states she will go to the ER.  Risks of not doing so discussed with the patient.      - POCT Urinalysis  - POCT Pregnancy  - US-GYN-PELVIS TRANSVAGINAL; Future  - US-INGUINAL HERNIA; Future       Differential diagnoses, Supportive care, and indications for immediate follow-up discussed with patient.   Instructed to return to clinic or nearest emergency department for any change in condition, further concerns, or worsening of symptoms.    The patient  demonstrated a good understanding and agreed with the treatment plan.  Mayelin Farmer P.A.-C.

## 2017-09-19 NOTE — ED NOTES
Pt presents to the ER with c/o RLQ abd pain for 3 days. Pt was sent here from . Pt denies n/v/d. Pt states that she feels like the area is swollen and when her  pressed on it he felt a ball there.

## 2017-09-19 NOTE — ED NOTES
Pt D/C to home. VSS. IV removed. D/C instructions and prescriptions given to patient. Pt told to return for worsening or changing pain or any fevers. Pt to f/u with pcp as needed. Pt verbalizes understanding. Pt leaves ED with no acute changes, complaints or concerns. Pt ambulated out with a steady gait

## 2017-09-19 NOTE — ED NOTES
"Chief Complaint   Patient presents with   • RLQ Pain     Pt c/o RLQ pain x 3 days. Pt denies n/v/d   • Sent from Urgent Care     Pt seen at  today for same. Pt had ultra sound done and was referred to ED for further evaluation and r/o appendicitis      /73   Pulse 66   Temp 36.8 °C (98.3 °F)   Resp 18   Ht 1.626 m (5' 4\")   Wt 98.5 kg (217 lb 2.5 oz)   SpO2 98%   BMI 37.27 kg/m²     "

## 2017-09-19 NOTE — DISCHARGE INSTRUCTIONS
Abdominal Pain (Nonspecific)  Your exam might not show the exact reason you have abdominal pain. Since there are many different causes of abdominal pain, another checkup and more tests may be needed. It is very important to follow up for lasting (persistent) or worsening symptoms. A possible cause of abdominal pain in any person who still has his or her appendix is acute appendicitis. Appendicitis is often hard to diagnose. Normal blood tests, urine tests, ultrasound, and CT scans do not completely rule out early appendicitis or other causes of abdominal pain. Sometimes, only the changes that happen over time will allow appendicitis and other causes of abdominal pain to be determined. Other potential problems that may require surgery may also take time to become more apparent. Because of this, it is important that you follow all of the instructions below.  HOME CARE INSTRUCTIONS   · Rest as much as possible.   · Do not eat solid food until your pain is gone.   · While adults or children have pain: A diet of water, weak decaffeinated tea, broth or bouillon, gelatin, oral rehydration solutions (ORS), frozen ice pops, or ice chips may be helpful.   · When pain is gone in adults or children: Start a light diet (dry toast, crackers, applesauce, or white rice). Increase the diet slowly as long as it does not bother you. Eat no dairy products (including cheese and eggs) and no spicy, fatty, fried, or high-fiber foods.   · Use no alcohol, caffeine, or cigarettes.   · Take your regular medicines unless your caregiver told you not to.   · Take any prescribed medicine as directed.   · Only take over-the-counter or prescription medicines for pain, discomfort, or fever as directed by your caregiver. Do not give aspirin to children.   If your caregiver has given you a follow-up appointment, it is very important to keep that appointment. Not keeping the appointment could result in a permanent injury and/or lasting (chronic) pain  and/or disability. If there is any problem keeping the appointment, you must call to reschedule.   SEEK IMMEDIATE MEDICAL CARE IF:   · Your pain is not gone in 24 hours.   · Your pain becomes worse, changes location, or feels different.   · You or your child has an oral temperature above 102° F (38.9° C), not controlled by medicine.   · Your baby is older than 3 months with a rectal temperature of 102° F (38.9° C) or higher.   · Your baby is 3 months old or younger with a rectal temperature of 100.4° F (38° C) or higher.   · You have shaking chills.   · You keep throwing up (vomiting) or cannot drink liquids.   · There is blood in your vomit or you see blood in your bowel movements.   · Your bowel movements become dark or black.   · You have frequent bowel movements.   · Your bowel movements stop (become blocked) or you cannot pass gas.   · You have bloody, frequent, or painful urination.   · You have yellow discoloration in the skin or whites of the eyes.   · Your stomach becomes bloated or bigger.   · You have dizziness or fainting.   · You have chest or back pain.   MAKE SURE YOU:   · Understand these instructions.   · Will watch your condition.   · Will get help right away if you are not doing well or get worse.   Document Released: 12/18/2006 Document Revised: 03/11/2013 Document Reviewed: 11/15/2010  [employer]® Patient Information ©2013 [employer].    Ovarian Cyst  An ovarian cyst is a fluid-filled sac that forms on an ovary. The ovaries are small organs that produce eggs in women. Various types of cysts can form on the ovaries. Most are not cancerous. Many do not cause problems, and they often go away on their own. Some may cause symptoms and require treatment. Common types of ovarian cysts include:  · Functional cysts--These cysts may occur every month during the menstrual cycle. This is normal. The cysts usually go away with the next menstrual cycle if the woman does not get pregnant. Usually, there are  "no symptoms with a functional cyst.  · Endometrioma cysts--These cysts form from the tissue that lines the uterus. They are also called \"chocolate cysts\" because they become filled with blood that turns brown. This type of cyst can cause pain in the lower abdomen during intercourse and with your menstrual period.  · Cystadenoma cysts--This type develops from the cells on the outside of the ovary. These cysts can get very big and cause lower abdomen pain and pain with intercourse. This type of cyst can twist on itself, cut off its blood supply, and cause severe pain. It can also easily rupture and cause a lot of pain.  · Dermoid cysts--This type of cyst is sometimes found in both ovaries. These cysts may contain different kinds of body tissue, such as skin, teeth, hair, or cartilage. They usually do not cause symptoms unless they get very big.  · Theca lutein cysts--These cysts occur when too much of a certain hormone (human chorionic gonadotropin) is produced and overstimulates the ovaries to produce an egg. This is most common after procedures used to assist with the conception of a baby (in vitro fertilization).  CAUSES   · Fertility drugs can cause a condition in which multiple large cysts are formed on the ovaries. This is called ovarian hyperstimulation syndrome.  · A condition called polycystic ovary syndrome can cause hormonal imbalances that can lead to nonfunctional ovarian cysts.  SIGNS AND SYMPTOMS   Many ovarian cysts do not cause symptoms. If symptoms are present, they may include:  · Pelvic pain or pressure.  · Pain in the lower abdomen.  · Pain during sexual intercourse.  · Increasing girth (swelling) of the abdomen.  · Abnormal menstrual periods.  · Increasing pain with menstrual periods.  · Stopping having menstrual periods without being pregnant.  DIAGNOSIS   These cysts are commonly found during a routine or annual pelvic exam. Tests may be ordered to find out more about the cyst. These tests may " include:  · Ultrasound.  · X-ray of the pelvis.  · CT scan.  · MRI.  · Blood tests.  TREATMENT   Many ovarian cysts go away on their own without treatment. Your health care provider may want to check your cyst regularly for 2-3 months to see if it changes. For women in menopause, it is particularly important to monitor a cyst closely because of the higher rate of ovarian cancer in menopausal women. When treatment is needed, it may include any of the following:  · A procedure to drain the cyst (aspiration). This may be done using a long needle and ultrasound. It can also be done through a laparoscopic procedure. This involves using a thin, lighted tube with a tiny camera on the end (laparoscope) inserted through a small incision.  · Surgery to remove the whole cyst. This may be done using laparoscopic surgery or an open surgery involving a larger incision in the lower abdomen.  · Hormone treatment or birth control pills. These methods are sometimes used to help dissolve a cyst.  HOME CARE INSTRUCTIONS   · Only take over-the-counter or prescription medicines as directed by your health care provider.  · Follow up with your health care provider as directed.  · Get regular pelvic exams and Pap tests.  SEEK MEDICAL CARE IF:   · Your periods are late, irregular, or painful, or they stop.  · Your pelvic pain or abdominal pain does not go away.  · Your abdomen becomes larger or swollen.  · You have pressure on your bladder or trouble emptying your bladder completely.  · You have pain during sexual intercourse.  · You have feelings of fullness, pressure, or discomfort in your stomach.  · You lose weight for no apparent reason.  · You feel generally ill.  · You become constipated.  · You lose your appetite.  · You develop acne.  · You have an increase in body and facial hair.  · You are gaining weight, without changing your exercise and eating habits.  · You think you are pregnant.  SEEK IMMEDIATE MEDICAL CARE IF:   · You have  increasing abdominal pain.  · You feel sick to your stomach (nauseous), and you throw up (vomit).  · You develop a fever that comes on suddenly.  · You have abdominal pain during a bowel movement.  · Your menstrual periods become heavier than usual.  MAKE SURE YOU:  · Understand these instructions.  · Will watch your condition.  · Will get help right away if you are not doing well or get worse.     This information is not intended to replace advice given to you by your health care provider. Make sure you discuss any questions you have with your health care provider.     Document Released: 12/18/2006 Document Revised: 12/23/2014 Document Reviewed: 08/25/2014  [employer] Interactive Patient Education ©2016 [employer]

## 2017-09-19 NOTE — ED PROVIDER NOTES
ED Provider Note    CHIEF COMPLAINT  Chief Complaint   Patient presents with   • RLQ Pain     Pt c/o RLQ pain x 3 days. Pt denies n/v/d   • Sent from Urgent Care     Pt seen at  today for same. Pt had ultra sound done and was referred to ED for further evaluation and r/o appendicitis        HPI  Minnie Huffman is a 27 y.o. female who presentsStating that she is having right lower quadrant pain developing over the last 3 days that she says is quite low and nonradiating. Denies any fever, chills, sweats, vaginal discharge, bleeding, dysuria, frequency. Denies any vomiting. She takes birth control and has a history of polycystic ovarian disease. Earlier at the urgent care she had ultrasound of performed to rule out hernia and ovarian cyst disease or both negative. Denies any complaints or missed periods    REVIEW OF SYSTEMS  See HPI for further details. All other systems are negative.     PAST MEDICAL HISTORY  Past Medical History:   Diagnosis Date   • History of chickenpox 2011    developed this at 21   • PCOS (polycystic ovarian syndrome) 12/15/2009   • Frequent headaches        FAMILY HISTORY  Family History   Problem Relation Age of Onset   • Diabetes Mother    • Thyroid Mother    • Diabetes Father    • Hypertension Father    • Heart Disease Father    • Diabetes Brother    • Stroke Paternal Grandmother    • Other Sister      occipital headaches       SOCIAL HISTORY   reports that she has never smoked. She has never used smokeless tobacco. She reports that she does not drink alcohol or use drugs.    SURGICAL HISTORY  No past surgical history on file.    CURRENT MEDICATIONS  Home Medications     Reviewed by Matt Flores R.N. (Registered Nurse) on 09/18/17 at 1906  Med List Status: Complete   Medication Last Dose Status   albuterol 108 (90 BASE) MCG/ACT Aero Soln inhalation aerosol  Active   benzonatate (TESSALON) 100 MG Cap 6/23/2017 Active   clobetasol (TEMOVATE) 0.05 % Ointment  Active  "  fluticasone (FLONASE) 50 MCG/ACT nasal spray 6/23/2017 Active   ketoconazole (NIZORAL) 2 % Cream  Active   levothyroxine (SYNTHROID) 25 MCG Tab 9/18/2017 Active                ALLERGIES  No Known Allergies    PHYSICAL EXAM  VITAL SIGNS: /73   Pulse 66   Temp 36.8 °C (98.3 °F)   Resp 18   Ht 1.626 m (5' 4\")   Wt 98.5 kg (217 lb 2.5 oz)   LMP 07/26/2017   SpO2 98%   BMI 37.27 kg/m²    Constitutional: Well developed, Well nourished, No acute distress, Non-toxic appearance.   HENT: Normocephalic, Atraumatic, Bilateral external ears normal, Oropharynx is clear mucous membranes are moist. No oral exudates or nasal discharge.   Eyes: Pupils are equal round and reactive, EOMI, Conjunctiva normal, No discharge.   Neck: Normal range of motion, No tenderness, Supple, No stridor. No meningismus.  Lymphatic: No lymphadenopathy noted.   Cardiovascular: Regular rate and rhythm without murmur rub or gallop.  Thorax & Lungs: Clear breath sounds bilaterally without wheezes, rhonchi or rales. There is no chest wall tenderness.   Abdomen: Soft non-tender non-distended. There is no rebound or guarding. No organomegaly is appreciated. Bowel sounds are normal.  Skin: Normal without rash.   Back: No CVA or spinal tenderness.   Extremities: Intact distal pulses, No edema, No tenderness, No cyanosis, No clubbing. Capillary refill is less than 2 seconds.  Musculoskeletal: Good range of motion in all major joints. No tenderness to palpation or major deformities noted.   Neurologic: Alert & oriented x 3, Normal motor function, Normal sensory function, No focal deficits noted. Reflexes are normal.  Psychiatric: Affect normal, Judgment normal, Mood normal. There is no suicidal ideation or patient reported hallucinations.       RADIOLOGY/PROCEDURES  CT-ABDOMEN-PELVIS WITH   Final Result         1.  Enlarged right ovary with area of low-density which is indeterminant but could represent cyst. Recommend further characterization with " pelvic sonogram.   2.  Small fat-containing umbilical hernia              COURSE & MEDICAL DECISION MAKING  Pertinent Labs & Imaging studies reviewed. (See chart for details)  I had a long conversation with the patient about avoiding radiation given that her presentation certainly does not suggest appendicitis. I gave her the option of not doing a CT and seeing how things go over time and especially over the next 24-48 hours. After he shared decision making discussion the patient FOR lab work and CAT scan of the abdomen and pelvis understanding her risk for radiation exposure    Laboratory evaluation reveals negative beta hCG ruling out ectopic pregnancy. Urinalysis negative for infection. Electrolyte panel is unremarkable there's no evidence of significant leukocytosis or shift.    CT scan of the abdomen and pelvis without oral contrast reveals an enlarged right ovary with area of low-density concerning for ovarian cyst. There is no evidence of appendicitis.    Patient on recheck is pain controlled however she will need some medication for pain at home. I'm writing for Ultram to be used with ibuprofen pain relief with follow-up through her primary care provider. She understands that as her hormone cycle matures she will have retraction of this cyst and if it recurs she may need further intervention    FINAL IMPRESSION  1. Pelvic pain    2. Cyst of right ovary    3. PCO (polycystic ovaries)             Electronically signed by: Joey Love, 9/18/2017 9:33 PM

## 2017-09-21 ENCOUNTER — OFFICE VISIT (OUTPATIENT)
Dept: MEDICAL GROUP | Facility: CLINIC | Age: 27
End: 2017-09-21
Payer: COMMERCIAL

## 2017-09-21 VITALS
RESPIRATION RATE: 15 BRPM | HEIGHT: 64 IN | HEART RATE: 88 BPM | SYSTOLIC BLOOD PRESSURE: 118 MMHG | WEIGHT: 218.8 LBS | DIASTOLIC BLOOD PRESSURE: 64 MMHG | BODY MASS INDEX: 37.36 KG/M2 | TEMPERATURE: 97.5 F | OXYGEN SATURATION: 95 %

## 2017-09-21 DIAGNOSIS — E01.0 THYROMEGALY: ICD-10-CM

## 2017-09-21 DIAGNOSIS — E55.9 VITAMIN D DEFICIENCY: ICD-10-CM

## 2017-09-21 DIAGNOSIS — J06.9 URI WITH COUGH AND CONGESTION: ICD-10-CM

## 2017-09-21 DIAGNOSIS — N83.201 RIGHT OVARIAN CYST: ICD-10-CM

## 2017-09-21 DIAGNOSIS — R73.02 IGT (IMPAIRED GLUCOSE TOLERANCE): ICD-10-CM

## 2017-09-21 DIAGNOSIS — R73.09 ELEVATED GLYCOHEMOGLOBIN: ICD-10-CM

## 2017-09-21 DIAGNOSIS — R79.89 ELEVATED TSH: ICD-10-CM

## 2017-09-21 DIAGNOSIS — E78.1 HYPERTRIGLYCERIDEMIA: ICD-10-CM

## 2017-09-21 PROCEDURE — 99214 OFFICE O/P EST MOD 30 MIN: CPT | Performed by: FAMILY MEDICINE

## 2017-09-21 RX ORDER — BENZONATATE 100 MG/1
100 CAPSULE ORAL 3 TIMES DAILY PRN
Qty: 60 CAP | Refills: 0 | Status: SHIPPED | OUTPATIENT
Start: 2017-09-21 | End: 2017-12-01

## 2017-09-21 ASSESSMENT — PATIENT HEALTH QUESTIONNAIRE - PHQ9
CLINICAL INTERPRETATION OF PHQ2 SCORE: 2
5. POOR APPETITE OR OVEREATING: 2 - MORE THAN HALF THE DAYS

## 2017-09-21 NOTE — PROGRESS NOTES
Chief Complaint   Patient presents with   • Hospital Follow-up     ER visit 17 right side pain (Urine bacteria, cyst)   • Cough     low fever this morning/ cough/ not feeling well        Subjective:     HPI:   Minnie BarrosMatiWagner presents today with the followin. Right ovarian cyst  Had severe right lower quadrant and pelvic pain and was seen in the emergency room. Discussed the findings of fluid in the pelvis and probable ruptured right ovarian cyst. Patient has polycystic ovarian syndrome history and clinical presentation would be consistent. States that currently the pain has resolved.     2. Thyromegaly/elevated TSH  She states that the low-dose of levothyroxin made quite a difference. She feels much more energetic and is able to accomplish a lot more. She is due for recheck of her thyroid levels.    3. Elevated glycohemoglobin/impaired glucose tolerance  Unfortunately, she has impaired glucose tolerance. She has a strong family history of diabetes. With her history of PCO S she is also at increased risk of diabetes.  Her obesity has been discussed in the past. Discussed again today that she continues to gain weight which is not helpful. Patient states she is trying to be more active but when she had the pelvic pain she was highly inactive and also ate a very large meal yesterday. Discussed continuing to avoid concentrated sweets, drinks with sugar or simple carbohydrates. Lab orders are discussed and placed.    4. Hypertriglyceridemia  She needs to follow up on this. I do believe this is part of her entire metabolic syndrome. This has been discussed in the past and is discussed again today.    5. Vitamin D deficiency  Due for recheck.. Lab orders discussed and placed. CT of abdomen in the emergency room recently and ultrasound several years back did not show fatty liver.    6. URI with cough and congestion  She currently has what appears to be a viral URI and would prefer to defer her flu  "vaccine.        Patient Active Problem List    Diagnosis Date Noted   • Elevated glycohemoglobin 09/21/2017   • Vitamin D deficiency 09/21/2017   • Hypertriglyceridemia 09/21/2017   • IGT (impaired glucose tolerance) 05/31/2017   • Thyromegaly 12/20/2016   • Tension headache 12/20/2016   • Obesity (BMI 35.0-39.9 without comorbidity) (HCC) 09/29/2016   • Left-sided low back pain with left-sided sciatica 10/02/2015   • Seasonal allergies 09/26/2014   • PCOS (Polycystic Ovarian Syndrome) 12/15/2009       Current medicines (including changes today)  Current Outpatient Prescriptions   Medication Sig Dispense Refill   • benzonatate (TESSALON) 100 MG Cap Take 1 Cap by mouth 3 times a day as needed for Cough. 60 Cap 0   • tramadol (ULTRAM) 50 MG Tab Take 1-2 Tabs by mouth every 6 hours as needed. 25 Tab 0   • ketoconazole (NIZORAL) 2 % Cream Apply 1 Application to affected area(s) every day. 1 Tube 5   • clobetasol (TEMOVATE) 0.05 % Ointment Apply 1 Application to affected area(s) 2 times a day. On scalp 40 g 2   • levothyroxine (SYNTHROID) 25 MCG Tab Take 1 Tab by mouth Every morning on an empty stomach. 30 Tab 2   • albuterol 108 (90 BASE) MCG/ACT Aero Soln inhalation aerosol Inhale 2 Puffs by mouth every 6 hours as needed for Shortness of Breath. 8.5 g 0   • fluticasone (FLONASE) 50 MCG/ACT nasal spray Spray 2 Sprays in nose every day. 16 g 11     No current facility-administered medications for this visit.        No Known Allergies    ROS: As per HPI.         Objective:     Blood pressure 118/64, pulse 88, temperature 36.4 °C (97.5 °F), resp. rate 15, height 1.626 m (5' 4\"), weight 99.2 kg (218 lb 12.8 oz), last menstrual period 07/27/2017, SpO2 95 %, not currently breastfeeding. Body mass index is 37.56 kg/m².    Physical Exam:  Constitutional: Well-developed and well-nourished. Not diaphoretic. No distress. Lucid and fluent.  Skin: Skin is warm and dry. No rash noted.  Head: Atraumatic without lesions.  Eyes: " Conjunctivae and extraocular motions are normal. Pupils are equal, round, and reactive to light. No scleral icterus.   Ears:  External ears unremarkable. Tympanic membranes clear and intact.  Nose: Nares patent. Mucosa without edema or erythema. No discharge. No facial tenderness.  Mouth/Throat: Tongue normal. Oropharynx is clear and moist. Posterior pharynx without erythema or exudates.  Neck: Supple, trachea midline.Moderate diffuse thyromegaly without distinct mass is present.. No cervical or supraclavicular lymphadenopathy. No JVD or carotid bruits appreciated  Cardiovascular: Regular rate and rhythm.  Normal S1, S2 without murmur appreciated.  Chest: Effort normal. Clear to auscultation throughout. No adventitious sounds.   Abdomen: Soft, non tender, and without distention. Active bowel sounds in all four quadrants. No rebound, guarding, masses or hepatosplenomegaly.  Extremities: No cyanosis, clubbing, erythema, nor edema.   Neurological: Alert and oriented x 3. DTRs 2+/3 and symmetric. No cranial nerve deficit.  Psychiatric:  Behavior, mood, and affect are appropriate.       Assessment and Plan:     27 y.o. female with the following issues:    1. Right ovarian cyst     2. Thyromegaly  TSH   3. Elevated TSH  TSH   4. Elevated glycohemoglobin  COMP METABOLIC PANEL    LIPID PROFILE    HEMOGLOBIN A1C   5. IGT (impaired glucose tolerance)  COMP METABOLIC PANEL    LIPID PROFILE    HEMOGLOBIN A1C   6. Hypertriglyceridemia  LIPID PROFILE   7. Vitamin D deficiency  VITAMIN D,25 HYDROXY   8. URI with cough and congestion  benzonatate (TESSALON) 100 MG Cap         Followup: Return in about 2 months (around 11/21/2017), or if symptoms worsen or fail to improve.

## 2017-10-03 ENCOUNTER — PATIENT MESSAGE (OUTPATIENT)
Dept: MEDICAL GROUP | Facility: CLINIC | Age: 27
End: 2017-10-03

## 2017-10-03 DIAGNOSIS — R05.3 PERSISTENT COUGH: ICD-10-CM

## 2017-10-03 RX ORDER — PROMETHAZINE HYDROCHLORIDE AND CODEINE PHOSPHATE 6.25; 1 MG/5ML; MG/5ML
5 SYRUP ORAL 4 TIMES DAILY PRN
Qty: 240 ML | Refills: 0 | Status: SHIPPED
Start: 2017-10-03 | End: 2017-12-01

## 2017-10-17 ENCOUNTER — PATIENT MESSAGE (OUTPATIENT)
Dept: MEDICAL GROUP | Facility: CLINIC | Age: 27
End: 2017-10-17

## 2017-10-17 DIAGNOSIS — R73.02 IGT (IMPAIRED GLUCOSE TOLERANCE): ICD-10-CM

## 2017-10-17 DIAGNOSIS — E78.1 HYPERTRIGLYCERIDEMIA: ICD-10-CM

## 2017-10-18 NOTE — TELEPHONE ENCOUNTER
From: Minnie Huffman  To: ALMITA Trammell  Sent: 10/17/2017 4:18 PM PDT  Subject: Non-Urgent Medical Question    Hello,    So finally got in to see the nutritionist at [facility] and she was awesome! However she recommended that if I could get lipid profile NMR ordered because she believes that I could have metabolic syndrome based of my labs a while back and that I also get a referral to them to see a endocrinologist because of that same reason. I was wondering if you could help me out with that, or if you would like me to come in.    Thank You,    Minnie

## 2017-10-23 ENCOUNTER — TELEPHONE (OUTPATIENT)
Dept: MEDICAL GROUP | Facility: CLINIC | Age: 27
End: 2017-10-23

## 2017-10-23 NOTE — LETTER
October 27, 2017        Patient: Minnie Huffman   YOB: 1990   Date of Visit: 9/21/2017         To Whom It May Concern:    It is my medical opinion that Minnie Huffman is addressing the following issues medically;    Patient Active Problem List    Diagnosis Date Noted   • Elevated glycohemoglobin 09/21/2017   • Vitamin D deficiency 09/21/2017   • Hypertriglyceridemia 09/21/2017   • IGT (impaired glucose tolerance) 05/31/2017   • Thyromegaly 12/20/2016   • Tension headache 12/20/2016   • Obesity (BMI 35.0-39.9 without comorbidity) 09/29/2016   • Left-sided low back pain with left-sided sciatica 10/02/2015   • Seasonal allergies 09/26/2014   • PCOS (Polycystic Ovarian Syndrome) 12/15/2009     Polycystic ovarian syndrome creates infertility as well as prediabetes. This is best treated with frequent testing and endocrinology input. This is best treated with access to lab testing and [facility] medical care.  It is my medical opinion that the patient is best treated here in the [place] where she has full access to care.    If you have any questions or concerns, please don't hesitate to call.    Sincerely,          Rubens Gaspar M.D.  Electronically Signed  [address]  [place] [id number] [phone] (Phone)  [phone] (Fax)

## 2017-10-23 NOTE — TELEPHONE ENCOUNTER
1. Caller Name: nicole                       Call Back Number: [phone]     2. Message: patient is requesting a letter for immigration that states she has medical issues that need to be addressed in the [place]. Please advise, thank you.     3. Patient approves office to leave a detailed voicemail/MyChart message: yes

## 2017-11-02 ENCOUNTER — IMMUNIZATION (OUTPATIENT)
Dept: OCCUPATIONAL MEDICINE | Facility: CLINIC | Age: 27
End: 2017-11-02

## 2017-11-02 DIAGNOSIS — Z23 NEED FOR VACCINATION: ICD-10-CM

## 2017-11-02 PROCEDURE — 90686 IIV4 VACC NO PRSV 0.5 ML IM: CPT | Performed by: PREVENTIVE MEDICINE

## 2017-11-08 ENCOUNTER — HOSPITAL ENCOUNTER (OUTPATIENT)
Dept: LAB | Facility: MEDICAL CENTER | Age: 27
End: 2017-11-08
Attending: FAMILY MEDICINE
Payer: COMMERCIAL

## 2017-11-08 DIAGNOSIS — R79.89 ELEVATED TSH: ICD-10-CM

## 2017-11-08 DIAGNOSIS — E01.0 THYROMEGALY: ICD-10-CM

## 2017-11-08 DIAGNOSIS — R73.02 IGT (IMPAIRED GLUCOSE TOLERANCE): ICD-10-CM

## 2017-11-08 DIAGNOSIS — E55.9 VITAMIN D DEFICIENCY: ICD-10-CM

## 2017-11-08 DIAGNOSIS — E78.1 HYPERTRIGLYCERIDEMIA: ICD-10-CM

## 2017-11-08 DIAGNOSIS — R73.09 ELEVATED GLYCOHEMOGLOBIN: ICD-10-CM

## 2017-11-08 LAB
25(OH)D3 SERPL-MCNC: 7 NG/ML (ref 30–100)
ALBUMIN SERPL BCP-MCNC: 4.1 G/DL (ref 3.2–4.9)
ALBUMIN/GLOB SERPL: 1.5 G/DL
ALP SERPL-CCNC: 47 U/L (ref 30–99)
ALT SERPL-CCNC: 14 U/L (ref 2–50)
ANION GAP SERPL CALC-SCNC: 8 MMOL/L (ref 0–11.9)
AST SERPL-CCNC: 13 U/L (ref 12–45)
BILIRUB SERPL-MCNC: 0.3 MG/DL (ref 0.1–1.5)
BUN SERPL-MCNC: 14 MG/DL (ref 8–22)
CALCIUM SERPL-MCNC: 9 MG/DL (ref 8.5–10.5)
CHLORIDE SERPL-SCNC: 105 MMOL/L (ref 96–112)
CHOLEST SERPL-MCNC: 178 MG/DL (ref 100–199)
CO2 SERPL-SCNC: 23 MMOL/L (ref 20–33)
CREAT SERPL-MCNC: 0.67 MG/DL (ref 0.5–1.4)
EST. AVERAGE GLUCOSE BLD GHB EST-MCNC: 128 MG/DL
GFR SERPL CREATININE-BSD FRML MDRD: >60 ML/MIN/1.73 M 2
GLOBULIN SER CALC-MCNC: 2.8 G/DL (ref 1.9–3.5)
GLUCOSE SERPL-MCNC: 91 MG/DL (ref 65–99)
HBA1C MFR BLD: 6.1 % (ref 0–5.6)
HDLC SERPL-MCNC: 33 MG/DL
LDLC SERPL CALC-MCNC: 124 MG/DL
POTASSIUM SERPL-SCNC: 4.2 MMOL/L (ref 3.6–5.5)
PROT SERPL-MCNC: 6.9 G/DL (ref 6–8.2)
SODIUM SERPL-SCNC: 136 MMOL/L (ref 135–145)
TRIGL SERPL-MCNC: 106 MG/DL (ref 0–149)
TSH SERPL DL<=0.005 MIU/L-ACNC: 2.41 UIU/ML (ref 0.3–3.7)

## 2017-11-08 PROCEDURE — 83036 HEMOGLOBIN GLYCOSYLATED A1C: CPT

## 2017-11-08 PROCEDURE — 84443 ASSAY THYROID STIM HORMONE: CPT

## 2017-11-08 PROCEDURE — 80061 LIPID PANEL: CPT

## 2017-11-08 PROCEDURE — 80053 COMPREHEN METABOLIC PANEL: CPT

## 2017-11-08 PROCEDURE — 36415 COLL VENOUS BLD VENIPUNCTURE: CPT

## 2017-11-08 PROCEDURE — 82306 VITAMIN D 25 HYDROXY: CPT

## 2017-12-01 ENCOUNTER — HOSPITAL ENCOUNTER (OUTPATIENT)
Facility: MEDICAL CENTER | Age: 27
End: 2017-12-01
Attending: FAMILY MEDICINE
Payer: COMMERCIAL

## 2017-12-01 ENCOUNTER — OFFICE VISIT (OUTPATIENT)
Dept: MEDICAL GROUP | Facility: CLINIC | Age: 27
End: 2017-12-01
Payer: COMMERCIAL

## 2017-12-01 VITALS
BODY MASS INDEX: 37.05 KG/M2 | WEIGHT: 217 LBS | HEART RATE: 90 BPM | HEIGHT: 64 IN | TEMPERATURE: 98.1 F | OXYGEN SATURATION: 95 % | SYSTOLIC BLOOD PRESSURE: 128 MMHG | DIASTOLIC BLOOD PRESSURE: 78 MMHG

## 2017-12-01 DIAGNOSIS — R31.9 HEMATURIA, UNSPECIFIED TYPE: ICD-10-CM

## 2017-12-01 DIAGNOSIS — M24.251 DISORDER OF LIGAMENT OF RIGHT HIP: ICD-10-CM

## 2017-12-01 DIAGNOSIS — G89.29 CHRONIC RIGHT HIP PAIN: ICD-10-CM

## 2017-12-01 DIAGNOSIS — M25.551 CHRONIC RIGHT HIP PAIN: ICD-10-CM

## 2017-12-01 DIAGNOSIS — N83.8 RIGHT OVARIAN ENLARGEMENT: ICD-10-CM

## 2017-12-01 LAB
APPEARANCE UR: CLEAR
BILIRUB UR STRIP-MCNC: NORMAL MG/DL
COLOR UR AUTO: YELLOW
GLUCOSE UR STRIP.AUTO-MCNC: NORMAL MG/DL
KETONES UR STRIP.AUTO-MCNC: NORMAL MG/DL
LEUKOCYTE ESTERASE UR QL STRIP.AUTO: NORMAL
NITRITE UR QL STRIP.AUTO: NORMAL
PH UR STRIP.AUTO: 6.5 [PH] (ref 5–8)
PROT UR QL STRIP: NORMAL MG/DL
RBC UR QL AUTO: NORMAL
SP GR UR STRIP.AUTO: 1.02
UROBILINOGEN UR STRIP-MCNC: NORMAL MG/DL

## 2017-12-01 PROCEDURE — 99214 OFFICE O/P EST MOD 30 MIN: CPT | Performed by: FAMILY MEDICINE

## 2017-12-01 PROCEDURE — 87077 CULTURE AEROBIC IDENTIFY: CPT

## 2017-12-01 PROCEDURE — 81002 URINALYSIS NONAUTO W/O SCOPE: CPT | Performed by: FAMILY MEDICINE

## 2017-12-01 PROCEDURE — 87086 URINE CULTURE/COLONY COUNT: CPT

## 2017-12-01 RX ORDER — IBUPROFEN 800 MG/1
800 TABLET ORAL
Qty: 90 TAB | Refills: 3 | Status: SHIPPED | OUTPATIENT
Start: 2017-12-01 | End: 2018-05-19

## 2017-12-01 RX ORDER — TRAMADOL HYDROCHLORIDE 50 MG/1
50 TABLET ORAL EVERY 6 HOURS PRN
Qty: 90 TAB | Refills: 0 | Status: SHIPPED | OUTPATIENT
Start: 2017-12-01 | End: 2018-05-19

## 2017-12-01 ASSESSMENT — PAIN SCALES - GENERAL: PAINLEVEL: 6=MODERATE PAIN

## 2017-12-01 NOTE — PROGRESS NOTES
Chief Complaint   Patient presents with   • Abdominal Pain       Subjective:     HPI:   Minnie BarrosMatiWagner presents today with the followin. Chronic right hip pain  Has had medial hip pain now for nearly 3 months.  No clear cause of this.  No trauma.  Discussed use of medication for pain.  The ibuprofen is not helping enough, taking 800 mg.    2. Disorder of ligament of right hip  The pain is in the right obdurator muscle region.  Imaging discussed.    3. Right ovarian enlargement  CT showed right ovarian enlargement and abnormality but the US did not show this.  Agreed on follow up gynecologic in three months which is now.    4. Hematuria, unspecified type  Urinalysis shows hematuria, culture is sent.  No clear dysuria.  Denies fever or chills.        Patient Active Problem List    Diagnosis Date Noted   • Elevated glycohemoglobin 2017   • Vitamin D deficiency 2017   • Hypertriglyceridemia 2017   • IGT (impaired glucose tolerance) 2017   • Thyromegaly 2016   • Tension headache 2016   • Obesity (BMI 35.0-39.9 without comorbidity) 2016   • Left-sided low back pain with left-sided sciatica 10/02/2015   • Seasonal allergies 2014   • PCOS (Polycystic Ovarian Syndrome) 12/15/2009       Current medicines (including changes today)  Current Outpatient Prescriptions   Medication Sig Dispense Refill   • tramadol (ULTRAM) 50 MG Tab Take 1 Tab by mouth every 6 hours as needed for Moderate Pain. 30 day prescription.  ICD-10 code: M25.551, G89.29. 90 Tab 0   • ibuprofen (MOTRIN) 800 MG Tab Take 1 Tab by mouth 3 times a day, with meals. 90 Tab 3   • levothyroxine (SYNTHROID) 25 MCG Tab Take 1 Tab by mouth Every morning on an empty stomach. 30 Tab 2   • ketoconazole (NIZORAL) 2 % Cream Apply 1 Application to affected area(s) every day. 1 Tube 5   • clobetasol (TEMOVATE) 0.05 % Ointment Apply 1 Application to affected area(s) 2 times a day. On scalp 40 g 2   •  "albuterol 108 (90 BASE) MCG/ACT Aero Soln inhalation aerosol Inhale 2 Puffs by mouth every 6 hours as needed for Shortness of Breath. 8.5 g 0   • fluticasone (FLONASE) 50 MCG/ACT nasal spray Spray 2 Sprays in nose every day. 16 g 11     No current facility-administered medications for this visit.        No Known Allergies    ROS: As per HPI       Objective:     Blood pressure 128/78, pulse 90, temperature 36.7 °C (98.1 °F), height 1.626 m (5' 4\"), weight 98.4 kg (217 lb), last menstrual period 10/20/2017, SpO2 95 %, not currently breastfeeding. Body mass index is 37.25 kg/m².    Physical Exam:  Constitutional: Well-developed and well-nourished. Not diaphoretic. No distress. Lucid and fluent.  Skin: Skin is warm and dry. No rash noted.  Head: Atraumatic without lesions.  Eyes: Conjunctivae and extraocular motions are normal. Pupils are equal, round, and reactive to light. No scleral icterus.   Ears:  External ears unremarkable. Tympanic membranes clear and intact.  Nose: Nares patent. Mucosa without edema or erythema. No discharge. No facial tenderness.  Mouth/Throat: Tongue normal. Oropharynx is clear and moist. Posterior pharynx without erythema or exudates.  Neck: Supple, trachea midline. No thyromegaly present. No cervical or supraclavicular lymphadenopathy. No JVD or carotid bruits appreciated  Cardiovascular: Regular rate and rhythm.  Normal S1, S2 without murmur appreciated.  Chest: Effort normal. Clear to auscultation throughout. No adventitious sounds.   Abdomen: Soft, non tender, and without distention. Active bowel sounds in all four quadrants. No rebound, guarding, masses or hepatosplenomegaly.  Extremities: No cyanosis, clubbing, erythema, nor edema.   Neurological: Alert and oriented x 3.  Psychiatric:  Behavior, mood, and affect are appropriate.       Assessment and Plan:     27 y.o. female with the following issues:    1. Chronic right hip pain  MR-HIP-W/O RIGHT    ibuprofen (MOTRIN) 800 MG Tab   2. " Disorder of ligament of right hip  MR-HIP-W/O RIGHT    ibuprofen (MOTRIN) 800 MG Tab   3. Right ovarian enlargement  US-GYN-PELVIS TRANSVAGINAL   4. Hematuria, unspecified type  URINE CULTURE(NEW)         Followup: No Follow-up on file.

## 2017-12-03 LAB
BACTERIA UR CULT: ABNORMAL
BACTERIA UR CULT: ABNORMAL
SIGNIFICANT IND 70042: ABNORMAL
SOURCE SOURCE: ABNORMAL

## 2017-12-05 ENCOUNTER — PATIENT MESSAGE (OUTPATIENT)
Dept: MEDICAL GROUP | Facility: CLINIC | Age: 27
End: 2017-12-05

## 2017-12-05 DIAGNOSIS — N30.00 ACUTE CYSTITIS WITHOUT HEMATURIA: ICD-10-CM

## 2017-12-07 RX ORDER — CEPHALEXIN 500 MG/1
500 CAPSULE ORAL 3 TIMES DAILY
Qty: 21 CAP | Refills: 0 | Status: SHIPPED | OUTPATIENT
Start: 2017-12-07 | End: 2017-12-14

## 2017-12-28 DIAGNOSIS — E01.0 THYROMEGALY: ICD-10-CM

## 2017-12-28 DIAGNOSIS — R53.82 CHRONIC FATIGUE: ICD-10-CM

## 2017-12-29 RX ORDER — LEVOTHYROXINE SODIUM 0.03 MG/1
TABLET ORAL
Qty: 30 TAB | Refills: 4 | Status: SHIPPED | OUTPATIENT
Start: 2017-12-29 | End: 2018-07-31 | Stop reason: SDUPTHER

## 2018-05-19 ENCOUNTER — APPOINTMENT (OUTPATIENT)
Dept: RADIOLOGY | Facility: IMAGING CENTER | Age: 28
End: 2018-05-19
Attending: FAMILY MEDICINE
Payer: COMMERCIAL

## 2018-05-19 ENCOUNTER — OFFICE VISIT (OUTPATIENT)
Dept: URGENT CARE | Facility: CLINIC | Age: 28
End: 2018-05-19
Payer: COMMERCIAL

## 2018-05-19 VITALS
SYSTOLIC BLOOD PRESSURE: 112 MMHG | BODY MASS INDEX: 38.09 KG/M2 | HEART RATE: 71 BPM | DIASTOLIC BLOOD PRESSURE: 72 MMHG | TEMPERATURE: 98.2 F | WEIGHT: 215 LBS | OXYGEN SATURATION: 98 % | HEIGHT: 63 IN

## 2018-05-19 DIAGNOSIS — M77.50 ANKLE TENDINITIS: ICD-10-CM

## 2018-05-19 PROCEDURE — 99214 OFFICE O/P EST MOD 30 MIN: CPT | Performed by: FAMILY MEDICINE

## 2018-05-19 PROCEDURE — 73610 X-RAY EXAM OF ANKLE: CPT | Mod: TC,LT | Performed by: FAMILY MEDICINE

## 2018-05-19 RX ORDER — MELOXICAM 15 MG/1
15 TABLET ORAL DAILY
Qty: 7 TAB | Refills: 1 | Status: SHIPPED | OUTPATIENT
Start: 2018-05-19 | End: 2018-05-26

## 2018-05-19 ASSESSMENT — ENCOUNTER SYMPTOMS
HEMOPTYSIS: 0
SHORTNESS OF BREATH: 0
ORTHOPNEA: 0
FOCAL WEAKNESS: 0
CHILLS: 0
DIZZINESS: 0
FEVER: 0

## 2018-05-19 NOTE — LETTER
May 19, 2018         Patient: Minnie Huffman   YOB: 1990   Date of Visit: 5/19/2018           To Whom it May Concern:    Minnie Huffman was seen in my clinic on 5/19/2018. She may return to work in 2-4 days.    If you have any questions or concerns, please don't hesitate to call.        Sincerely,           Rajesh Patino M.D.  Electronically Signed

## 2018-05-19 NOTE — PROGRESS NOTES
"Subjective:      Minnie Huffman is a 28 y.o. female who presents with Ankle Pain (x1.5 week left ankle pain, swollen and tender )    Chief Complaint   Patient presents with   • Ankle Pain     x1.5 week left ankle pain, swollen and tender         - This is a very pleasant 28 y.o. female with complaints of pain Lt outer and posterior ankle x 1.5wks. No trauma/fever or anything new she can think of.             ALLERGIES:  Patient has no known allergies.     PMH:  Past Medical History:   Diagnosis Date   • Elevated glycohemoglobin    • Frequent headaches    • History of chickenpox 2011    developed this at 21   • Impaired glucose tolerance    • PCOS (polycystic ovarian syndrome) 12/15/2009        MEDS:    Current Outpatient Prescriptions:   •  meloxicam (MOBIC) 15 MG tablet, Take 1 Tab by mouth every day for 7 days., Disp: 7 Tab, Rfl: 1  •  levothyroxine (SYNTHROID) 25 MCG Tab, TAKE ONE TABLET BY MOUTH ONCE DAILY IN THE MORNING ON  AN  EMPTY  STOMACH, Disp: 30 Tab, Rfl: 4  •  tramadol (ULTRAM) 50 MG Tab, Take 1 Tab by mouth every 6 hours as needed for Moderate Pain. 30 day prescription.  ICD-10 code: M25.551, G89.29., Disp: 90 Tab, Rfl: 0    ** I have documented what I find to be significant in regards to past medical, social, family and surgical history  in my HPI or under PMH/PSH/FH review section, otherwise it is contributory **           HPI    Review of Systems   Constitutional: Negative for chills and fever.   Respiratory: Negative for hemoptysis and shortness of breath.    Cardiovascular: Negative for chest pain and orthopnea.   Neurological: Negative for dizziness and focal weakness.          Objective:     /72   Pulse 71   Temp 36.8 °C (98.2 °F)   Ht 1.6 m (5' 3\")   Wt 97.5 kg (215 lb)   SpO2 98%   BMI 38.09 kg/m²      Physical Exam   Constitutional: She appears well-developed. No distress.   HENT:   Head: Normocephalic and atraumatic.   Mouth/Throat: Oropharynx is clear and moist. "   Eyes: Conjunctivae are normal.   Neck: Neck supple.   Cardiovascular: Regular rhythm.    No murmur heard.  Pulmonary/Chest: Effort normal. No respiratory distress.   Neurological: She is alert. She exhibits normal muscle tone.   Skin: Skin is warm and dry.   Psychiatric: She has a normal mood and affect. Judgment normal.   Nursing note and vitals reviewed.  Lt ankle: + TTP over achilles tendon insertion point and below lateral malleolus and over base 5th MT. Full SROM              Assessment/Plan:         1. Ankle tendinitis  DX-ANKLE 3+ VIEWS LEFT    REFERRAL TO PODIATRY    meloxicam (MOBIC) 15 MG tablet     Xray: no acute findings by MY READ. RADIOLOGY READ PENDING.     - RICE  - walker boot  - podiatry recheck 1-2wks if not better  - ROM stretching    Dx & d/c instructions discussed w/ patient and/or family members. Follow up w/ Prvt Dr or here in 7-14 days if not getting better, sooner if needed,  ER if worse and UC/PCP unavailable.        Possible side effects (i.e. Rash, GI upset/constipation, sedation, elevation of BP or sugars) of any medications given discussed.

## 2018-07-31 ENCOUNTER — OFFICE VISIT (OUTPATIENT)
Dept: MEDICAL GROUP | Facility: CLINIC | Age: 28
End: 2018-07-31
Payer: COMMERCIAL

## 2018-07-31 VITALS
HEART RATE: 83 BPM | OXYGEN SATURATION: 96 % | BODY MASS INDEX: 37.03 KG/M2 | RESPIRATION RATE: 13 BRPM | DIASTOLIC BLOOD PRESSURE: 78 MMHG | TEMPERATURE: 97.5 F | WEIGHT: 209 LBS | SYSTOLIC BLOOD PRESSURE: 110 MMHG | HEIGHT: 63 IN

## 2018-07-31 DIAGNOSIS — F41.8 SITUATIONAL ANXIETY: ICD-10-CM

## 2018-07-31 DIAGNOSIS — E78.6 LOW HDL (UNDER 40): ICD-10-CM

## 2018-07-31 DIAGNOSIS — E28.2 PCOS (POLYCYSTIC OVARIAN SYNDROME): Chronic | ICD-10-CM

## 2018-07-31 DIAGNOSIS — E01.0 THYROMEGALY: ICD-10-CM

## 2018-07-31 DIAGNOSIS — R73.03 PREDIABETES: ICD-10-CM

## 2018-07-31 DIAGNOSIS — R73.09 ELEVATED GLYCOHEMOGLOBIN: ICD-10-CM

## 2018-07-31 DIAGNOSIS — E55.9 VITAMIN D DEFICIENCY: ICD-10-CM

## 2018-07-31 DIAGNOSIS — E78.1 HYPERTRIGLYCERIDEMIA: ICD-10-CM

## 2018-07-31 PROCEDURE — 99214 OFFICE O/P EST MOD 30 MIN: CPT | Performed by: FAMILY MEDICINE

## 2018-07-31 RX ORDER — LEVOTHYROXINE SODIUM 0.03 MG/1
25 TABLET ORAL
Qty: 90 TAB | Refills: 3 | Status: SHIPPED | OUTPATIENT
Start: 2018-07-31 | End: 2019-09-19 | Stop reason: SDUPTHER

## 2018-07-31 RX ORDER — METFORMIN HYDROCHLORIDE 500 MG/1
500 TABLET, EXTENDED RELEASE ORAL
Qty: 90 TAB | Refills: 3 | Status: SHIPPED | OUTPATIENT
Start: 2018-07-31 | End: 2019-11-05 | Stop reason: SDUPTHER

## 2018-07-31 RX ORDER — ESCITALOPRAM OXALATE 10 MG/1
10 TABLET ORAL DAILY
Qty: 30 TAB | Refills: 6 | Status: SHIPPED | OUTPATIENT
Start: 2018-07-31 | End: 2018-11-26 | Stop reason: SDUPTHER

## 2018-07-31 RX ORDER — ALPRAZOLAM 0.25 MG/1
0.25 TABLET ORAL 3 TIMES DAILY PRN
Qty: 21 TAB | Refills: 0 | Status: SHIPPED | OUTPATIENT
Start: 2018-07-31 | End: 2018-10-10 | Stop reason: SDUPTHER

## 2018-07-31 NOTE — PROGRESS NOTES
Chief Complaint   Patient presents with   • Elevated Glucose   • Hyperlipidemia   • Thyroid Problem   • Vitamin D Deficiency       Subjective:     HPI:   Minnie Valentine Armida presents today with the followin. Prediabetes/PCOS  Saw nutritionist recently.  Discussed that she should be on metformin.  She was on it in the past, well tolerated.  Lab orders are discussed and placed.    2. Elevated glycohemoglobin  This is at a prediabetic level.  Lab orders discussed and placed.    3. Obesity, Class II, BMI 35-39.9, with comorbidity  She has managed to lose, is more active at work.      4. Thyromegaly  Needs thyroid medication follow up testing.  Has been very fatigued.  Stopped the thyroid medication for a while.  She felt very poorly off it.  She has now resumed a little over a month.    5. Vitamin D deficiency  Is taking 2000 units daily.  Needs follow up lab testing.    6. Hypertriglyceridemia/Low HDL (under 40)  Patient denies chest pain, chest pressure, palpitations or exertional shortness of breath. Patient is not on lipid lowering medication.  The triglycerides are improved on a better diet. Patient is a never smoker. Patient takes no aspirin daily. Patient has no history of myocardial infarction, stroke or PVD.      7. Situational Anxiety  She is very busy, working.  Taking care of her kids.  Helping her .  Her father has been accused of complex family problems.  Discussed pros and cons of alprazolam.  Discussed trial of lexapro once daily.  Denies thoughts of self harm.    She is dealing with multiple major stressors.      Patient Active Problem List    Diagnosis Date Noted   • Prediabetes 2018   • Elevated glycohemoglobin 2017   • Vitamin D deficiency 2017   • Hypertriglyceridemia 2017   • IGT (impaired glucose tolerance) 2017   • Thyromegaly 2016   • Tension headache 2016   • Obesity, Class II, BMI 35-39.9, with comorbidity 2016   •  "Left-sided low back pain with left-sided sciatica 10/02/2015   • Seasonal allergies 09/26/2014   • PCOS (Polycystic Ovarian Syndrome) 12/15/2009       Current medicines (including changes today)  Current Outpatient Prescriptions   Medication Sig Dispense Refill   • levothyroxine (SYNTHROID) 25 MCG Tab Take 1 Tab by mouth Every morning on an empty stomach. 90 Tab 3   • metFORMIN ER (GLUCOPHAGE XR) 500 MG TABLET SR 24 HR Take 1 Tab by mouth ONE-HALF HOUR AFTER DINNER. 90 Tab 3   • ALPRAZolam (XANAX) 0.25 MG Tab Take 1 Tab by mouth 3 times a day as needed for Anxiety for up to 7 days. 21 Tab 0   • escitalopram (LEXAPRO) 10 MG Tab Take 1 Tab by mouth every day. 30 Tab 6     No current facility-administered medications for this visit.        No Known Allergies    ROS: As per HPI       Objective:     Blood pressure 110/78, pulse 83, temperature 36.4 °C (97.5 °F), resp. rate 13, height 1.6 m (5' 3\"), weight 94.8 kg (209 lb), SpO2 96 %. Body mass index is 37.02 kg/m².    Physical Exam:  Constitutional: Well-developed and well-nourished. Not diaphoretic. No distress. Lucid and fluent.  Skin: Skin is warm and dry. No rash noted.  Head: Atraumatic without lesions.  Eyes: Conjunctivae and extraocular motions are normal. Pupils are equal, round, and reactive to light. No scleral icterus.   Mouth/Throat: Tongue normal. Oropharynx is clear and moist. Posterior pharynx without erythema or exudates.  Neck: Supple, trachea midline. No thyromegaly present. No cervical or supraclavicular lymphadenopathy. No JVD or carotid bruits appreciated  Cardiovascular: Regular rate and rhythm.  Normal S1, S2 without murmur appreciated.  Chest: Effort normal. Clear to auscultation throughout. No adventitious sounds.   Abdomen: Soft, non tender, and without distention. Active bowel sounds in all four quadrants. No rebound, guarding, masses or hepatosplenomegaly.  Extremities: No cyanosis, clubbing, erythema, nor edema.   Neurological: Alert and " oriented x 3.  No tremor noted.  Psychiatric:  Behavior, mood, and affect are appropriate.       Assessment and Plan:     28 y.o. female with the following issues:    1. Prediabetes  COMP METABOLIC PANEL    HEMOGLOBIN A1C    metFORMIN ER (GLUCOPHAGE XR) 500 MG TABLET SR 24 HR   2. Elevated glycohemoglobin  COMP METABOLIC PANEL    HEMOGLOBIN A1C   3. Obesity, Class II, BMI 35-39.9, with comorbidity  Patient identified as having weight management issue.  Appropriate orders and counseling given.   4. Thyromegaly  TSH    FREE THYROXINE    levothyroxine (SYNTHROID) 25 MCG Tab   5. Vitamin D deficiency  VITAMIN D,25 HYDROXY   6. Hypertriglyceridemia  LIPID PROFILE   7. Low HDL (under 40)  COMP METABOLIC PANEL    LIPID PROFILE   8. PCOS (polycystic ovarian syndrome)  metFORMIN ER (GLUCOPHAGE XR) 500 MG TABLET SR 24 HR   9. Situational anxiety  ALPRAZolam (XANAX) 0.25 MG Tab    escitalopram (LEXAPRO) 10 MG Tab         Followup: Return in about 3 months (around 10/31/2018), or if symptoms worsen or fail to improve.

## 2018-10-10 DIAGNOSIS — F41.8 SITUATIONAL ANXIETY: ICD-10-CM

## 2018-10-10 RX ORDER — ALPRAZOLAM 0.25 MG/1
0.25 TABLET ORAL 3 TIMES DAILY PRN
Qty: 21 TAB | Refills: 0 | Status: SHIPPED
Start: 2018-10-10 | End: 2018-11-26 | Stop reason: SDUPTHER

## 2018-10-10 NOTE — PROGRESS NOTES
Patient is undergoing very severe situational stress with a family tragedy.  Renewal of alprazolam is requested and is sent to her [facility] pharmacy.

## 2018-10-24 LAB
25(OH)D3+25(OH)D2 SERPL-MCNC: 20.4 NG/ML (ref 30–100)
ALBUMIN SERPL-MCNC: 4.1 G/DL (ref 3.5–5.5)
ALBUMIN/GLOB SERPL: 1.6 {RATIO} (ref 1.2–2.2)
ALP SERPL-CCNC: 53 IU/L (ref 39–117)
ALT SERPL-CCNC: 16 IU/L (ref 0–32)
AST SERPL-CCNC: 15 IU/L (ref 0–40)
BILIRUB SERPL-MCNC: <0.2 MG/DL (ref 0–1.2)
BUN SERPL-MCNC: 8 MG/DL (ref 6–20)
BUN/CREAT SERPL: 12 (ref 9–23)
CALCIUM SERPL-MCNC: 8.9 MG/DL (ref 8.7–10.2)
CHLORIDE SERPL-SCNC: 103 MMOL/L (ref 96–106)
CHOLEST SERPL-MCNC: 181 MG/DL (ref 100–199)
CO2 SERPL-SCNC: 20 MMOL/L (ref 20–29)
CREAT SERPL-MCNC: 0.65 MG/DL (ref 0.57–1)
GLOBULIN SER CALC-MCNC: 2.5 G/DL (ref 1.5–4.5)
GLUCOSE SERPL-MCNC: 80 MG/DL (ref 65–99)
HBA1C MFR BLD: 6 % (ref 4.8–5.6)
HDLC SERPL-MCNC: 36 MG/DL
LABORATORY COMMENT REPORT: ABNORMAL
LDLC SERPL CALC-MCNC: 115 MG/DL (ref 0–99)
POTASSIUM SERPL-SCNC: 4.5 MMOL/L (ref 3.5–5.2)
PROT SERPL-MCNC: 6.6 G/DL (ref 6–8.5)
SODIUM SERPL-SCNC: 137 MMOL/L (ref 134–144)
T4 FREE SERPL-MCNC: 1.16 NG/DL (ref 0.82–1.77)
TRIGL SERPL-MCNC: 152 MG/DL (ref 0–149)
TSH SERPL DL<=0.005 MIU/L-ACNC: 2.75 UIU/ML (ref 0.45–4.5)
VLDLC SERPL CALC-MCNC: 30 MG/DL (ref 5–40)

## 2018-11-26 ENCOUNTER — OFFICE VISIT (OUTPATIENT)
Dept: MEDICAL GROUP | Facility: MEDICAL CENTER | Age: 28
End: 2018-11-26
Payer: COMMERCIAL

## 2018-11-26 VITALS
BODY MASS INDEX: 37.73 KG/M2 | TEMPERATURE: 98.7 F | WEIGHT: 221 LBS | OXYGEN SATURATION: 98 % | RESPIRATION RATE: 16 BRPM | DIASTOLIC BLOOD PRESSURE: 64 MMHG | HEIGHT: 64 IN | SYSTOLIC BLOOD PRESSURE: 108 MMHG | HEART RATE: 64 BPM

## 2018-11-26 DIAGNOSIS — E28.2 PCOS (POLYCYSTIC OVARIAN SYNDROME): Chronic | ICD-10-CM

## 2018-11-26 DIAGNOSIS — R73.03 PREDIABETES: ICD-10-CM

## 2018-11-26 DIAGNOSIS — E55.9 VITAMIN D DEFICIENCY: ICD-10-CM

## 2018-11-26 DIAGNOSIS — Z23 NEED FOR IMMUNIZATION AGAINST INFLUENZA: ICD-10-CM

## 2018-11-26 DIAGNOSIS — E78.1 HYPERTRIGLYCERIDEMIA: ICD-10-CM

## 2018-11-26 DIAGNOSIS — R73.09 ELEVATED GLYCOHEMOGLOBIN: ICD-10-CM

## 2018-11-26 DIAGNOSIS — F41.8 SITUATIONAL ANXIETY: ICD-10-CM

## 2018-11-26 PROCEDURE — 90686 IIV4 VACC NO PRSV 0.5 ML IM: CPT | Performed by: FAMILY MEDICINE

## 2018-11-26 PROCEDURE — 90471 IMMUNIZATION ADMIN: CPT | Performed by: FAMILY MEDICINE

## 2018-11-26 PROCEDURE — 99214 OFFICE O/P EST MOD 30 MIN: CPT | Mod: 25 | Performed by: FAMILY MEDICINE

## 2018-11-26 RX ORDER — ESCITALOPRAM OXALATE 10 MG/1
10 TABLET ORAL DAILY
Qty: 30 TAB | Refills: 6 | Status: SHIPPED | OUTPATIENT
Start: 2018-11-26 | End: 2019-11-05 | Stop reason: SDUPTHER

## 2018-11-26 RX ORDER — ALPRAZOLAM 0.25 MG/1
0.25 TABLET ORAL 3 TIMES DAILY PRN
Qty: 21 TAB | Refills: 0 | Status: SHIPPED | OUTPATIENT
Start: 2018-11-26 | End: 2019-03-25 | Stop reason: SDUPTHER

## 2018-11-26 RX ORDER — ALPRAZOLAM 0.25 MG/1
TABLET ORAL
COMMUNITY
Start: 2018-10-22 | End: 2018-11-26

## 2018-11-26 ASSESSMENT — PATIENT HEALTH QUESTIONNAIRE - PHQ9
5. POOR APPETITE OR OVEREATING: 1 - SEVERAL DAYS
SUM OF ALL RESPONSES TO PHQ QUESTIONS 1-9: 10
CLINICAL INTERPRETATION OF PHQ2 SCORE: 4

## 2018-11-26 NOTE — PROGRESS NOTES
Chief Complaint   Patient presents with   • Elevated Glucose   • Hyperlipidemia   • Vitamin D Deficiency       Subjective:     HPI:   Minnie PenalozaSuki presents today with the followin. PCOS (polycystic ovarian syndrome)/hypertriglyceridemia  She is working out more.  Her periods are not regular as she is on nexplanon.  Follow up lab orders discussed and placed.      2. Situational anxiety  Family situation is very bad.  She has two alprazolam left.  Alprazolam renewed.    3. Vitamin D deficiency  Still low but improved.  Continues 2000 unit per day supplementation.    4. Elevated glycohemoglobin/Prediabetes  Her AIc has improved.  She is exercising more.  Trying to limit her starches and sugar.  Discussed increasing high fiber vegetables.  Strong family history of diabetes.    5. Need for immunization against influenza  Administered today        Patient Active Problem List    Diagnosis Date Noted   • Prediabetes 2018   • Elevated glycohemoglobin 2017   • Vitamin D deficiency 2017   • Hypertriglyceridemia 2017   • IGT (impaired glucose tolerance) 2017   • Thyromegaly 2016   • Tension headache 2016   • Obesity, Class II, BMI 35-39.9, with comorbidity 2016   • Left-sided low back pain with left-sided sciatica 10/02/2015   • Seasonal allergies 2014   • PCOS (Polycystic Ovarian Syndrome) 12/15/2009       Current medicines (including changes today)  Current Outpatient Prescriptions   Medication Sig Dispense Refill   • escitalopram (LEXAPRO) 10 MG Tab Take 1 Tab by mouth every day. 30 Tab 6   • ALPRAZolam (XANAX) 0.25 MG Tab Take 1 Tab by mouth 3 times a day as needed for Anxiety for up to 7 days. 21 Tab 0   • levothyroxine (SYNTHROID) 25 MCG Tab Take 1 Tab by mouth Every morning on an empty stomach. 90 Tab 3   • metFORMIN ER (GLUCOPHAGE XR) 500 MG TABLET SR 24 HR Take 1 Tab by mouth ONE-HALF HOUR AFTER DINNER. 90 Tab 3     No current  "facility-administered medications for this visit.        No Known Allergies    ROS: As per HPI       Objective:     Blood pressure 108/64, pulse 64, temperature 37.1 °C (98.7 °F), resp. rate 16, height 1.626 m (5' 4\"), weight 100.2 kg (221 lb), SpO2 98 %, not currently breastfeeding. Body mass index is 37.93 kg/m².    Physical Exam:  Constitutional: Well-developed and well-nourished. Not diaphoretic. No distress. Lucid and fluent.  Skin: Skin is warm and dry. No rash noted.  Head: Atraumatic without lesions.  Eyes: Conjunctivae and extraocular motions are normal. Pupils are equal, round, and reactive to light. No scleral icterus.   Mouth/Throat: Tongue normal. Oropharynx is clear and moist. Posterior pharynx without erythema or exudates.  Neck: Supple, trachea midline. No thyromegaly present. No cervical or supraclavicular lymphadenopathy. No JVD or carotid bruits appreciated  Cardiovascular: Regular rate and rhythm.  Normal S1, S2 without murmur appreciated.  Chest: Effort normal. Clear to auscultation throughout. No adventitious sounds.   Abdomen: Soft, non tender, and without distention. Active bowel sounds in all four quadrants. No rebound, guarding, masses or hepatosplenomegaly.  Extremities: No cyanosis, clubbing, erythema, nor edema.   Neurological: Alert and oriented x 3. No tremor.  Psychiatric:  Behavior, mood, and affect are appropriate.       Assessment and Plan:     28 y.o. female with the following issues:    1. PCOS (polycystic ovarian syndrome)  HEMOGLOBIN A1C    COMP METABOLIC PANEL    Lipid Profile    CBC WITHOUT DIFFERENTIAL   2. Vitamin D deficiency     3. Elevated glycohemoglobin  HEMOGLOBIN A1C    COMP METABOLIC PANEL    Lipid Profile   4. Prediabetes  HEMOGLOBIN A1C    COMP METABOLIC PANEL   5. Hypertriglyceridemia  COMP METABOLIC PANEL    Lipid Profile   6. Need for immunization against influenza  Influenza Vaccine Quad Injection >3Y (PF)   7. Situational anxiety  escitalopram (LEXAPRO) 10 MG " Tab    ALPRAZolam (XANAX) 0.25 MG Tab         Followup: Return in about 6 months (around 5/26/2019), or if symptoms worsen or fail to improve.

## 2019-02-06 ENCOUNTER — OFFICE VISIT (OUTPATIENT)
Dept: MEDICAL GROUP | Facility: MEDICAL CENTER | Age: 29
End: 2019-02-06
Payer: COMMERCIAL

## 2019-02-06 ENCOUNTER — HOSPITAL ENCOUNTER (OUTPATIENT)
Dept: RADIOLOGY | Facility: MEDICAL CENTER | Age: 29
End: 2019-02-06
Attending: NURSE PRACTITIONER
Payer: COMMERCIAL

## 2019-02-06 VITALS
DIASTOLIC BLOOD PRESSURE: 72 MMHG | HEART RATE: 75 BPM | WEIGHT: 220 LBS | BODY MASS INDEX: 37.56 KG/M2 | SYSTOLIC BLOOD PRESSURE: 116 MMHG | RESPIRATION RATE: 16 BRPM | OXYGEN SATURATION: 96 % | HEIGHT: 64 IN

## 2019-02-06 DIAGNOSIS — M25.561 ACUTE PAIN OF RIGHT KNEE: ICD-10-CM

## 2019-02-06 PROCEDURE — 99214 OFFICE O/P EST MOD 30 MIN: CPT | Performed by: NURSE PRACTITIONER

## 2019-02-06 PROCEDURE — 73564 X-RAY EXAM KNEE 4 OR MORE: CPT | Mod: RT

## 2019-02-06 RX ORDER — DICLOFENAC SODIUM 75 MG/1
75 TABLET, DELAYED RELEASE ORAL 2 TIMES DAILY
Qty: 30 TAB | Refills: 1 | Status: SHIPPED | OUTPATIENT
Start: 2019-02-06 | End: 2019-03-12

## 2019-02-06 ASSESSMENT — PATIENT HEALTH QUESTIONNAIRE - PHQ9: CLINICAL INTERPRETATION OF PHQ2 SCORE: 0

## 2019-02-06 NOTE — PROGRESS NOTES
Subjective:      Minnie Huffman is a 28 y.o. female who presents with Knee Pain        CC: This is a patient of Dr. Gaspar here same day visit for right knee pain.    HPI Minnie Huffman    1. Acute pain of right knee  Patient reports gradual onset of right knee pain approximately 4 days ago.  She states there was no trauma or known precipitating event.  Since then, she states there is pain mostly on the anterior portion of the patella and superior and lateral to this.  There is a mild dull throbbing at rest and it worsens with flexion or extension of the knee.  She states she is able to bear weight on the leg and it has not locked up on her.  She has not noticed redness or swelling.  She denies prior history of joint problems.  She states she otherwise feels her normal self.    Current Outpatient Prescriptions   Medication Sig Dispense Refill   • diclofenac EC (VOLTAREN) 75 MG Tablet Delayed Response Take 1 Tab by mouth 2 times a day. 30 Tab 1   • escitalopram (LEXAPRO) 10 MG Tab Take 1 Tab by mouth every day. 30 Tab 6   • levothyroxine (SYNTHROID) 25 MCG Tab Take 1 Tab by mouth Every morning on an empty stomach. 90 Tab 3   • metFORMIN ER (GLUCOPHAGE XR) 500 MG TABLET SR 24 HR Take 1 Tab by mouth ONE-HALF HOUR AFTER DINNER. 90 Tab 3     No current facility-administered medications for this visit.      Past Medical History:   Diagnosis Date   • Elevated glycohemoglobin    • Frequent headaches    • History of chickenpox 2011    developed this at 21   • Impaired glucose tolerance    • PCOS (polycystic ovarian syndrome) 12/15/2009     Family History   Problem Relation Age of Onset   • Diabetes Mother    • Thyroid Mother    • Diabetes Father    • Hypertension Father    • Heart Disease Father    • Diabetes Brother    • Stroke Paternal Grandmother    • Other Sister         occipital headaches     Social History   Substance Use Topics   • Smoking status: Never Smoker   • Smokeless tobacco: Never  "Used   • Alcohol use No       Review of Systems   Musculoskeletal: Positive for joint pain.   All other systems reviewed and are negative.         Objective:     /72 (BP Location: Right arm, Patient Position: Sitting, BP Cuff Size: Adult)   Pulse 75   Resp 16   Ht 1.626 m (5' 4\")   Wt 99.8 kg (220 lb)   SpO2 96% Comment: 98  BMI 37.76 kg/m²      Physical Exam   Constitutional: She is oriented to person, place, and time. She appears well-developed and well-nourished. No distress.   HENT:   Head: Normocephalic and atraumatic.   Right Ear: External ear normal.   Left Ear: External ear normal.   Nose: Nose normal.   Eyes: Right eye exhibits no discharge. Left eye exhibits no discharge.   Neck: Normal range of motion. Neck supple. No thyromegaly present.   Cardiovascular: Normal rate, regular rhythm and normal heart sounds.  Exam reveals no gallop and no friction rub.    No murmur heard.  Pulmonary/Chest: Effort normal and breath sounds normal. She has no wheezes. She has no rales.   Musculoskeletal: She exhibits no edema or tenderness.        Right knee: She exhibits normal range of motion, no swelling and no LCL laxity. No tenderness found.   Patient reports pain in the anterior right knee with going into a squat as well as with flexion and extension but she has full range of motion and there is no redness or swelling.  She is able to ambulate in the halls without assistance.   Neurological: She is alert and oriented to person, place, and time. She displays normal reflexes.   Skin: Skin is warm and dry. No rash noted. She is not diaphoretic.   Psychiatric: She has a normal mood and affect. Her behavior is normal. Judgment and thought content normal.   Nursing note and vitals reviewed.              Assessment/Plan:     1. Acute pain of right knee  Patient was provided with a prescription for diclofenac to use up to twice a day as needed for pain with food.  I advised her to stop the medicine when she no " longer needs it and it is only meant short-term.  Her most recent creatinine was normal.  She has no history of ulcers.  I will get x-ray and advised her that if she did not improve we should consider physical therapy in the next week or so.  RICE information provided included:  I told patient to rest the affected area as much as possible. They are to apply ice for 20 minute intervals to the affected area but not directly to the skin. I discussed compression using some sort of splint to prevent movement of the area. Finally, I explained the need for elevation to decrease swelling.  - DX-KNEE COMPLETE 4+ RIGHT; Future  - diclofenac EC (VOLTAREN) 75 MG Tablet Delayed Response; Take 1 Tab by mouth 2 times a day.  Dispense: 30 Tab; Refill: 1

## 2019-03-12 ENCOUNTER — OFFICE VISIT (OUTPATIENT)
Dept: MEDICAL GROUP | Age: 29
End: 2019-03-12
Payer: COMMERCIAL

## 2019-03-12 VITALS
OXYGEN SATURATION: 96 % | DIASTOLIC BLOOD PRESSURE: 60 MMHG | BODY MASS INDEX: 38.72 KG/M2 | TEMPERATURE: 98.1 F | HEART RATE: 74 BPM | HEIGHT: 64 IN | RESPIRATION RATE: 16 BRPM | WEIGHT: 226.8 LBS | SYSTOLIC BLOOD PRESSURE: 96 MMHG

## 2019-03-12 DIAGNOSIS — G44.209 TENSION HEADACHE: ICD-10-CM

## 2019-03-12 PROCEDURE — 99214 OFFICE O/P EST MOD 30 MIN: CPT | Performed by: INTERNAL MEDICINE

## 2019-03-12 RX ORDER — IBUPROFEN 800 MG/1
800 TABLET ORAL EVERY 8 HOURS PRN
Qty: 30 TAB | Refills: 0 | Status: SHIPPED | OUTPATIENT
Start: 2019-03-12 | End: 2019-11-13 | Stop reason: SDUPTHER

## 2019-03-12 RX ORDER — CYCLOBENZAPRINE HCL 5 MG
5-10 TABLET ORAL NIGHTLY PRN
Qty: 30 TAB | Refills: 0 | Status: SHIPPED | OUTPATIENT
Start: 2019-03-12 | End: 2019-05-03

## 2019-03-12 NOTE — ASSESSMENT & PLAN NOTE
This is a new patient to me.  This is a new problem to me.  Patient reported that she has headache intermittently for couple years.  She has recurrent headache started 2 weeks ago.  She reported that she feels very tightness on the back of the neck and radiated to the back of the head to bilateral temporal region.  She reported that her headache is dull and tightness.  She denied pre-warning signs or aura.  She reported that it is no triggering factor such as noise or bright light or weather changes. She denied photophobia or nausea or vomiting or dizziness or vision changes or weakness on one side of the body or extremities.  She reported feeling tired.  She reported that she used to take prescription ibuprofen 800 mg for headache and her symptoms usually resolve with ibuprofen.  She did not have any prescription ibuprofen currently and she took over-the-counter ibuprofen 200 mg every 6 hours as needed.  She stated that her headache resolved after taking ibuprofen, but recurred again in a few hours.  She reported having stress recently in her life.  Patient denied breast-feeding currently. She has her menstrual period currently.  Patient denied history of stomach pain or stomach bleeding or acid reflux or side effects from taking ibuprofen.

## 2019-03-12 NOTE — PROGRESS NOTES
Minnie Edward is a 28 y.o. female here for evaluation and management of:    Patient is a regular patient of Dr. Manuel.    HPI:    Tension headache  This is a new patient to me.  This is a new problem to me.  Patient reported that she has headache intermittently for couple years.  She has recurrent headache started 2 weeks ago.  She reported that she feels very tightness on the back of the neck and radiated to the back of the head to bilateral temporal region.  She reported that her headache is dull and tightness.  She denied pre-warning signs or aura.  She reported that it is no triggering factor such as noise or bright light or weather changes. She denied photophobia or nausea or vomiting or dizziness or vision changes or weakness on one side of the body or extremities.  She reported feeling tired.  She reported that she used to take prescription ibuprofen 800 mg for headache and her symptoms usually resolve with ibuprofen.  She did not have any prescription ibuprofen currently and she took over-the-counter ibuprofen 200 mg every 6 hours as needed.  She stated that her headache resolved after taking ibuprofen, but recurred again in a few hours.  She reported having stress recently in her life.  Patient denied breast-feeding currently. She has her menstrual period currently.  Patient denied history of stomach pain or stomach bleeding or acid reflux or side effects from taking ibuprofen.     Current medicines (including changes today)  Current Outpatient Prescriptions   Medication Sig Dispense Refill   • ibuprofen (MOTRIN) 800 MG Tab Take 1 Tab by mouth every 8 hours as needed for Moderate Pain or Headache. Take with meal and prilosec 20 mg 1 tab daily. 30 Tab 0   • cyclobenzaprine (FLEXERIL) 5 MG tablet Take 1-2 Tabs by mouth at bedtime as needed. 30 Tab 0   • escitalopram (LEXAPRO) 10 MG Tab Take 1 Tab by mouth every day. 30 Tab 6   • levothyroxine (SYNTHROID) 25 MCG Tab Take 1 Tab by mouth Every  "morning on an empty stomach. 90 Tab 3   • metFORMIN ER (GLUCOPHAGE XR) 500 MG TABLET SR 24 HR Take 1 Tab by mouth ONE-HALF HOUR AFTER DINNER. 90 Tab 3     No current facility-administered medications for this visit.      She  has a past medical history of Elevated glycohemoglobin; Frequent headaches; History of chickenpox (2011); Impaired glucose tolerance; and PCOS (polycystic ovarian syndrome) (12/15/2009).  She  has no past surgical history on file.  Social History   Substance Use Topics   • Smoking status: Never Smoker   • Smokeless tobacco: Never Used   • Alcohol use No     Social History     Social History Narrative    ** Merged History Encounter **          Family History   Problem Relation Age of Onset   • Diabetes Mother    • Thyroid Mother    • Diabetes Father    • Hypertension Father    • Heart Disease Father    • Diabetes Brother    • Stroke Paternal Grandmother    • Other Sister         occipital headaches     Family Status   Relation Status   • Mo (Not Specified)   • Fa (Not Specified)   • Bro (Not Specified)   • PGMo (Not Specified)   • Sis (Not Specified)     The patient has no Health Maintenance topics of status Overdue, Due On, or Due Soon      ROS    Gen.: Reported feeling tired.  Denied weight change, appetite change.  ENT: Denied sinus tenderness, nasal congestion, runny nose, or sore throat  CVS: Denied chest pain, palpitations, legs swelling.  Respiratory: Denied cough, shortness of breath, wheezing.  GI: Denied abdominal pain, constipation or diarrhea.  Endocrine: Denied temperature intolerance, increased frequency of urination, polyphagia or polydipsia.  Musculoskeletal: Reported pain on the back of the neck and headache.  Denied back pain or joint pain.    All other systems reviewed and are negative     Objective:     Blood pressure (!) 96/60, pulse 74, temperature 36.7 °C (98.1 °F), resp. rate 16, height 1.626 m (5' 4\"), weight 102.9 kg (226 lb 12.8 oz), SpO2 96 %, not currently " breastfeeding. Body mass index is 38.93 kg/m².  Physical Exam:    Constitutional: Well nourished and Well developed, Alert, no distress.  Skin: Warm, dry, good turgor, no rashes in visible areas.  Eye: Equal, round and reactive, conjunctiva clear, lids normal.  ENMT: Lips without lesions, good dentition, oropharynx clear.  Neck: Trachea midline, no masses, no thyromegaly. No cervical or supraclavicular lymphadenopathy.  Respiratory: Unlabored respiratory effort, lungs clear to auscultation, no wheezes, no ronchi.  Cardiovascular: Normal S1, S2, no murmur, no edema.  Abdomen: Soft, non distended, non-tender, no masses, no hepatosplenomegaly. Bowel sound normal.  Extremities: No edema, no clubbing, no cyanosis.  Psych: Alert and oriented x3, normal affect and mood.  Musculoskeletal exam: Tender on palpation of the back of the neck and trapezius muscle.        Assessment and Plan:   The following treatment plan was discussed       1. Tension headache  - Prescribed ibuprofen to take 1 tablet every 8 hours as needed for moderate pain and headache.  Patient is advised to take ibuprofen with meal and advised to take omeprazole 20 mg 1 tablets daily to prevent stomach irritation.  She is advised to take Flexeril 5 mg 1-2 tablets nightly as needed for tightness and spasm on the neck muscle and tension headache.  I advised patient not to drink alcohol or take other sedating medication when she is taking Flexeril.  Patient is advised not to drive or operate heavy machine when she feels drowsy from taking medication or feels dizziness for any reason.  - I also advised patient to return to clinic sooner if her symptoms do not improve with above treatment.  - She is advised to keep well-hydrated with water and fluid.  - I also briefly discussed with patient that if she has headache almost daily, she would need to take preventative medicine such as amitriptyline or topiramate to prevent recurrent headache.  She will discuss with  her primary care provider for her preventative medicine if she has recurrent headache.  - Patient is advised to seek urgent medical attention if she has any worsening headache or any new neurological symptoms with weakness or loss of sensation or tingling numbness or vision changes.  She stated understanding and agree with the plan.  I advised patient to follow-up with her primary care provider, Dr. Manuel in 4 weeks to discuss her headache and other chronic medical conditions.  - ibuprofen (MOTRIN) 800 MG Tab; Take 1 Tab by mouth every 8 hours as needed for Moderate Pain or Headache. Take with meal and prilosec 20 mg 1 tab daily.  Dispense: 30 Tab; Refill: 0  - cyclobenzaprine (FLEXERIL) 5 MG tablet; Take 1-2 Tabs by mouth at bedtime as needed.  Dispense: 30 Tab; Refill: 0      Followup: Return in about 4 weeks (around 4/9/2019), or if symptoms worsen or fail to improve, for Tension headache. sooner should new symptoms or problems arise.      Please note that this dictation was created using voice recognition software. I have made every reasonable attempt to correct obvious errors, but I expect that there may have unintended errors in text, spelling, punctuation, or grammar that I did not discover.

## 2019-03-19 ENCOUNTER — TELEPHONE (OUTPATIENT)
Dept: MEDICAL GROUP | Facility: MEDICAL CENTER | Age: 29
End: 2019-03-19

## 2019-03-25 DIAGNOSIS — F41.8 SITUATIONAL ANXIETY: ICD-10-CM

## 2019-03-25 RX ORDER — ALPRAZOLAM 0.25 MG/1
0.25 TABLET ORAL 3 TIMES DAILY PRN
Qty: 21 TAB | Refills: 0 | Status: SHIPPED
Start: 2019-03-25 | End: 2019-05-03 | Stop reason: SDUPTHER

## 2019-05-03 ENCOUNTER — OFFICE VISIT (OUTPATIENT)
Dept: MEDICAL GROUP | Facility: MEDICAL CENTER | Age: 29
End: 2019-05-03
Payer: COMMERCIAL

## 2019-05-03 VITALS
WEIGHT: 223 LBS | HEIGHT: 64 IN | RESPIRATION RATE: 16 BRPM | SYSTOLIC BLOOD PRESSURE: 118 MMHG | OXYGEN SATURATION: 97 % | HEART RATE: 63 BPM | BODY MASS INDEX: 38.07 KG/M2 | TEMPERATURE: 98.7 F | DIASTOLIC BLOOD PRESSURE: 64 MMHG

## 2019-05-03 DIAGNOSIS — M62.830 MUSCLE SPASM OF BACK: ICD-10-CM

## 2019-05-03 DIAGNOSIS — N92.1 MENOMETRORRHAGIA: ICD-10-CM

## 2019-05-03 DIAGNOSIS — G44.209 TENSION HEADACHE: ICD-10-CM

## 2019-05-03 DIAGNOSIS — F41.8 SITUATIONAL ANXIETY: ICD-10-CM

## 2019-05-03 PROCEDURE — 99213 OFFICE O/P EST LOW 20 MIN: CPT | Performed by: FAMILY MEDICINE

## 2019-05-03 RX ORDER — ALPRAZOLAM 0.25 MG/1
0.25 TABLET ORAL 3 TIMES DAILY PRN
Qty: 21 TAB | Refills: 0 | Status: SHIPPED | OUTPATIENT
Start: 2019-05-03 | End: 2020-05-26 | Stop reason: SDUPTHER

## 2019-05-03 RX ORDER — TIZANIDINE 4 MG/1
4 TABLET ORAL
Qty: 30 TAB | Refills: 4 | Status: SHIPPED | OUTPATIENT
Start: 2019-05-03 | End: 2020-11-09 | Stop reason: SDUPTHER

## 2019-05-03 NOTE — PROGRESS NOTES
Chief Complaint   Patient presents with   • Spasms     Back        Subjective:     HPI:   Minnie Edward presents today with the followin. Muscle spasm of back  Lumbar and sacral muscle spasm continues to be significant at times.  She went through chiropractic.  Could not find a physical therapy schedule that worked.  She has not been able to use the flexeril as it makes her sedated the next day, she would like to try PT.  Referral is placed. Trial of tizanidine hs.    2. Situational anxiety  Patient has a very difficult family situation which will not be resolved very soon.  She says the alprazolam continues to be helpful.  She tries to use this sparingly.  Review of [facility] Board of pharmacy interface shows that she is renewing intermittently as she says.  Denies any alcohol use.  Denies any rebound anxiety from the med stable and the medication is renewed.    3. Tension headache  Patient has frequent tension headaches.  We did try cyclobenzaprine but that was too sedating, she could not function well the next day.  We will try tizanidine at nighttime instead.    4. Menometrorrhagia  Had heavy menstrual bleeding off and on the last two months.  Needs to complete CBC      Patient Active Problem List    Diagnosis Date Noted   • Situational anxiety 2019   • Menometrorrhagia 2019   • Prediabetes 2018   • Elevated glycohemoglobin 2017   • Vitamin D deficiency 2017   • Hypertriglyceridemia 2017   • IGT (impaired glucose tolerance) 2017   • Thyromegaly 2016   • Tension headache 2016   • Obesity, Class II, BMI 35-39.9, with comorbidity 2016   • Left-sided low back pain with left-sided sciatica 10/02/2015   • Seasonal allergies 2014   • PCOS (Polycystic Ovarian Syndrome) 12/15/2009       Current medicines (including changes today)  Current Outpatient Prescriptions   Medication Sig Dispense Refill   • tizanidine (ZANAFLEX) 4 MG Tab Take 1  "Tab by mouth every bedtime. 30 Tab 4   • ALPRAZolam (XANAX) 0.25 MG Tab Take 1 Tab by mouth 3 times a day as needed for Anxiety for up to 7 days. 21 Tab 0   • ibuprofen (MOTRIN) 800 MG Tab Take 1 Tab by mouth every 8 hours as needed for Moderate Pain or Headache. Take with meal and prilosec 20 mg 1 tab daily. 30 Tab 0   • escitalopram (LEXAPRO) 10 MG Tab Take 1 Tab by mouth every day. 30 Tab 6   • levothyroxine (SYNTHROID) 25 MCG Tab Take 1 Tab by mouth Every morning on an empty stomach. 90 Tab 3   • metFORMIN ER (GLUCOPHAGE XR) 500 MG TABLET SR 24 HR Take 1 Tab by mouth ONE-HALF HOUR AFTER DINNER. 90 Tab 3     No current facility-administered medications for this visit.        No Known Allergies    ROS: As per HPI       Objective:     /64 (BP Location: Left arm)   Pulse 63   Temp 37.1 °C (98.7 °F)   Resp 16   Ht 1.626 m (5' 4\")   Wt 101.2 kg (223 lb)   SpO2 97%  Body mass index is 38.28 kg/m².    Physical Exam:  Constitutional: Well-developed and well-nourished. Not diaphoretic. No distress. Lucid and fluent.  Skin: Skin is warm and dry. No rash noted.  Head: Atraumatic without lesions.  Eyes: Conjunctivae and extraocular motions are normal. Pupils are equal, round, and reactive to light. No scleral icterus.   Nose: Nares patent. Mucosa without edema or erythema. No discharge. No facial tenderness.  Mouth/Throat: Tongue normal. Oropharynx is clear and moist. Posterior pharynx without erythema or exudates.  Neck: Supple, trachea midline. No thyromegaly present. No cervical or supraclavicular lymphadenopathy. No JVD or carotid bruits appreciated  Cardiovascular: Regular rate and rhythm.  Normal S1, S2 without murmur appreciated.  Chest: Effort normal. Clear to auscultation throughout. No adventitious sounds.   Back: moderate bilateral muscle spasm.  Midline lumbar tenderness and sacral tenderness.  Extremities: No cyanosis, clubbing, erythema, nor edema.   Neurological: Alert and oriented x 3. No tremor " appreciated.  Psychiatric:  Behavior, mood, and affect are appropriate.       Assessment and Plan:     29 y.o. female with the following issues:    1. Muscle spasm of back  tizanidine (ZANAFLEX) 4 MG Tab    REFERRAL TO PHYSICAL THERAPY Reason for Therapy: Eval/Treat/Report   2. Situational anxiety  ALPRAZolam (XANAX) 0.25 MG Tab   3. Tension headache     4. Menometrorrhagia       Lab orders from last fall are reprinted.  [facility] will [facility] at [facility]    Followup: Return in about 4 months (around 9/3/2019), or if symptoms worsen or fail to improve.

## 2019-05-03 NOTE — LETTER
May 3, 2019        Patient: Minnie Edward   YOB: 1990   Date of Visit: 5/3/2019       To Whom It May Concern:    It is my medical opinion that Minnie Edward suffers from chronic tension headache and chronic anxiety and depression, mostly situational.  She is on the following medications;      Current Outpatient Prescriptions:   •  tizanidine (ZANAFLEX) 4 MG Tab, Take 1 Tab by mouth every bedtime., Disp: 30 Tab, Rfl: 4  •  ALPRAZolam (XANAX) 0.25 MG Tab, Take 1 Tab by mouth 3 times a day as needed for Anxiety for up to 7 days., Disp: 21 Tab, Rfl: 0  •  ibuprofen (MOTRIN) 800 MG Tab, Take 1 Tab by mouth every 8 hours as needed for Moderate Pain or Headache. Take with meal and prilosec 20 mg 1 tab daily., Disp: 30 Tab, Rfl: 0  •  escitalopram (LEXAPRO) 10 MG Tab, Take 1 Tab by mouth every day., Disp: 30 Tab, Rfl: 6  •  levothyroxine (SYNTHROID) 25 MCG Tab, Take 1 Tab by mouth Every morning on an empty stomach., Disp: 90 Tab, Rfl: 3  •  metFORMIN ER (GLUCOPHAGE XR) 500 MG TABLET SR 24 HR, Take 1 Tab by mouth ONE-HALF HOUR AFTER DINNER., Disp: 90 Tab, Rfl: 3      Sincerely,          Rubens Gaspar M.D.    [facility] [address]  [address]  [facility] [address]  [place] [id number] [phone] (Phone)  [phone] (Fax)

## 2019-05-03 NOTE — LETTER
May 3, 2019        Patient: Minnie Edward   YOB: 1990   Date of Visit: 5/3/2019           To Whom It May Concern:    It is my medical opinion that Minnie Edward was off work 5/1/19 and 5/2/19 due to medical problems.  She is able to return to work 5/3/19.    If you have any questions or concerns, please don't hesitate to call.    Sincerely,          Rubens Gaspar M.D.    [address] [address]  [phone] (Phone)  [phone] (Fax)

## 2019-05-04 LAB
ALBUMIN SERPL-MCNC: 4.6 G/DL (ref 3.5–5.5)
ALBUMIN/GLOB SERPL: 1.7 {RATIO} (ref 1.2–2.2)
ALP SERPL-CCNC: 56 IU/L (ref 39–117)
ALT SERPL-CCNC: 15 IU/L (ref 0–32)
AST SERPL-CCNC: 13 IU/L (ref 0–40)
BILIRUB SERPL-MCNC: 0.2 MG/DL (ref 0–1.2)
BUN SERPL-MCNC: 10 MG/DL (ref 6–20)
BUN/CREAT SERPL: 14 (ref 9–23)
CALCIUM SERPL-MCNC: 9.7 MG/DL (ref 8.7–10.2)
CHLORIDE SERPL-SCNC: 105 MMOL/L (ref 96–106)
CHOLEST SERPL-MCNC: 215 MG/DL (ref 100–199)
CO2 SERPL-SCNC: 20 MMOL/L (ref 20–29)
CREAT SERPL-MCNC: 0.69 MG/DL (ref 0.57–1)
ERYTHROCYTE [DISTWIDTH] IN BLOOD BY AUTOMATED COUNT: 14.1 % (ref 12.3–15.4)
GLOBULIN SER CALC-MCNC: 2.7 G/DL (ref 1.5–4.5)
GLUCOSE SERPL-MCNC: 81 MG/DL (ref 65–99)
HBA1C MFR BLD: 6.2 % (ref 4.8–5.6)
HCT VFR BLD AUTO: 45.3 % (ref 34–46.6)
HDLC SERPL-MCNC: 34 MG/DL
HGB BLD-MCNC: 15.1 G/DL (ref 11.1–15.9)
LABORATORY COMMENT REPORT: ABNORMAL
LDLC SERPL CALC-MCNC: 141 MG/DL (ref 0–99)
MCH RBC QN AUTO: 30.7 PG (ref 26.6–33)
MCHC RBC AUTO-ENTMCNC: 33.3 G/DL (ref 31.5–35.7)
MCV RBC AUTO: 92 FL (ref 79–97)
NRBC BLD AUTO-RTO: ABNORMAL %
PLATELET # BLD AUTO: 446 X10E3/UL (ref 150–379)
POTASSIUM SERPL-SCNC: 3.9 MMOL/L (ref 3.5–5.2)
PROT SERPL-MCNC: 7.3 G/DL (ref 6–8.5)
RBC # BLD AUTO: 4.92 X10E6/UL (ref 3.77–5.28)
SODIUM SERPL-SCNC: 139 MMOL/L (ref 134–144)
TRIGL SERPL-MCNC: 202 MG/DL (ref 0–149)
VLDLC SERPL CALC-MCNC: 40 MG/DL (ref 5–40)
WBC # BLD AUTO: 9.5 X10E3/UL (ref 3.4–10.8)

## 2019-09-19 ENCOUNTER — OFFICE VISIT (OUTPATIENT)
Dept: MEDICAL GROUP | Facility: MEDICAL CENTER | Age: 29
End: 2019-09-19
Payer: COMMERCIAL

## 2019-09-19 VITALS
OXYGEN SATURATION: 97 % | DIASTOLIC BLOOD PRESSURE: 82 MMHG | RESPIRATION RATE: 16 BRPM | HEIGHT: 64 IN | WEIGHT: 232 LBS | TEMPERATURE: 98.8 F | HEART RATE: 89 BPM | BODY MASS INDEX: 39.61 KG/M2 | SYSTOLIC BLOOD PRESSURE: 124 MMHG

## 2019-09-19 DIAGNOSIS — E28.2 PCOS (POLYCYSTIC OVARIAN SYNDROME): ICD-10-CM

## 2019-09-19 DIAGNOSIS — J34.89 TENDERNESS OVER MAXILLARY SINUS: ICD-10-CM

## 2019-09-19 DIAGNOSIS — R73.02 IGT (IMPAIRED GLUCOSE TOLERANCE): ICD-10-CM

## 2019-09-19 DIAGNOSIS — E66.9 OBESITY, CLASS II, BMI 35-39.9: ICD-10-CM

## 2019-09-19 DIAGNOSIS — E01.0 THYROMEGALY: ICD-10-CM

## 2019-09-19 DIAGNOSIS — E55.9 VITAMIN D DEFICIENCY: ICD-10-CM

## 2019-09-19 DIAGNOSIS — E78.6 LOW HDL (UNDER 40): ICD-10-CM

## 2019-09-19 DIAGNOSIS — R73.09 ELEVATED GLYCOHEMOGLOBIN: ICD-10-CM

## 2019-09-19 DIAGNOSIS — E03.9 IDIOPATHIC HYPOTHYROIDISM: ICD-10-CM

## 2019-09-19 PROCEDURE — 99214 OFFICE O/P EST MOD 30 MIN: CPT | Performed by: FAMILY MEDICINE

## 2019-09-19 RX ORDER — AZITHROMYCIN 250 MG/1
TABLET, FILM COATED ORAL
Qty: 6 TAB | Refills: 0 | Status: SHIPPED | OUTPATIENT
Start: 2019-09-19 | End: 2019-11-05

## 2019-09-19 NOTE — PROGRESS NOTES
Chief Complaint   Patient presents with   • Sinus Problem   • Elevated Glucose   • Obesity       Subjective:     HPI:   Minnie Edward presents today with the followin. IGT (impaired glucose tolerance)/Elevated glycohemoglobin  Patient needs follow-up lab orders.  She is frustrated by her weight despite dietary changes.  She is frustrated by her general medical condition and wants to double check that she has not become diabetic.  Orders are discussed and placed.  Very strong family history of diabetes.  Brother    2. PCOS (polycystic ovarian syndrome)  Patient continues metformin ER for her PCOS.  This is a relatively small dose.  Lab orders for follow-up discussed and placed.    3. Low HDL (under 40)  Patient denies chest pain, chest pressure, palpitations or exertional shortness of breath. Patient is not on lipid-lowering medication.  Lipid panel in May was worse than previous.  However still moderate elevation.  Patient has been changing diet and would like to see if this has been helpful. Patient is a never smoker. Patient takes no aspirin daily. Patient has no history of myocardial infarction, stroke or PVD.  Lab orders are discussed and placed.    4. Obesity, Class II, BMI 35-39.9  Patient is struggling with her obesity and has tried various dietary changes without good success.  She would like a better long-term plan.  She is referred to registered dietitian therapy with health management.    5. Idiopathic hypothyroidism  Patient reports good energy level on the medication. Patient denies insomnia, tremor or change in appetite.  Patient is taking the medication on an empty stomach in the morning and waiting at least 30 minutes before eating.  Last TSH in October last year was slightly above target.  Lab orders discussed and placed.    6. Vitamin D deficiency  Patient last testing did show moderate vitamin D deficiency.  She has been taking supplementation and would like to recheck.  No  history of pathologic fracture.  Orders discussed and placed.    7. Tenderness over maxillary sinus  She has sinus pressure above the eyes and also bilateral maxillary sinus pressure.  The right sinus is quite tender.  Nasal mucosal exam is basically benign.  The throat appears to be benign as well.  However, patient does not have a history of recurring bacterial sinusitis and after discussion we did agree to antibiotic treatment.        Patient Active Problem List    Diagnosis Date Noted   • Situational anxiety 05/03/2019   • Menometrorrhagia 05/03/2019   • Prediabetes 07/31/2018   • Elevated glycohemoglobin 09/21/2017   • Vitamin D deficiency 09/21/2017   • Hypertriglyceridemia 09/21/2017   • IGT (impaired glucose tolerance) 05/31/2017   • Thyromegaly 12/20/2016   • Tension headache 12/20/2016   • Obesity, Class II, BMI 35-39.9, with comorbidity 09/29/2016   • Left-sided low back pain with left-sided sciatica 10/02/2015   • Seasonal allergies 09/26/2014   • PCOS (Polycystic Ovarian Syndrome) 12/15/2009       Current medicines (including changes today)  Current Outpatient Medications   Medication Sig Dispense Refill   • azithromycin (ZITHROMAX) 250 MG Tab As directed on pack 6 Tab 0   • tizanidine (ZANAFLEX) 4 MG Tab Take 1 Tab by mouth every bedtime. 30 Tab 4   • ibuprofen (MOTRIN) 800 MG Tab Take 1 Tab by mouth every 8 hours as needed for Moderate Pain or Headache. Take with meal and prilosec 20 mg 1 tab daily. 30 Tab 0   • escitalopram (LEXAPRO) 10 MG Tab Take 1 Tab by mouth every day. 30 Tab 6   • levothyroxine (SYNTHROID) 25 MCG Tab Take 1 Tab by mouth Every morning on an empty stomach. 90 Tab 3   • metFORMIN ER (GLUCOPHAGE XR) 500 MG TABLET SR 24 HR Take 1 Tab by mouth ONE-HALF HOUR AFTER DINNER. 90 Tab 3     No current facility-administered medications for this visit.        No Known Allergies    ROS: As per HPI       Objective:     /82   Pulse 89   Temp 37.1 °C (98.8 °F)   Resp 16   Ht 1.626 m (5'  "4\")   Wt 105.2 kg (232 lb)   SpO2 97%  Body mass index is 39.82 kg/m².    Physical Exam:  Constitutional: Well-developed and well-nourished. Not diaphoretic. No distress. Lucid and fluent.  Skin: Skin is warm and dry. No rash noted.  Head: Atraumatic without lesions.  Eyes: Conjunctivae and extraocular motions are normal. Pupils are equal, round, and reactive to light. No scleral icterus.   Nose: Nares patent. Mucosa without edema or erythema. No discharge. Maxillary facial tenderness, worse on right.  Mouth/Throat: Tongue normal. Oropharynx is clear and moist. Posterior pharynx without erythema or exudates.  Neck: Supple, trachea midline. No thyromegaly present. No cervical or supraclavicular lymphadenopathy. No JVD or carotid bruits appreciated  Cardiovascular: Regular rate and rhythm.  Normal S1, S2 without murmur appreciated.  Chest: Effort normal. Clear to auscultation throughout. No adventitious sounds.   Abdomen: Soft, non tender, and without distention. Active bowel sounds in all four quadrants. No rebound, guarding, masses or hepatosplenomegaly.  Extremities: No cyanosis, clubbing, erythema, nor edema.   Neurological: Alert and oriented x 3. Movements are symmetric.  Psychiatric:  Behavior, mood, and affect are appropriate.       Assessment and Plan:     29 y.o. female with the following issues:    1. IGT (impaired glucose tolerance)  Patient identified as having weight management issue.  Appropriate orders and counseling given.    REFERRAL TO [facility]PROV[facility] (HIP) Services Requested: Weight Management Program, Registered Dietitian for Medical Nutrition Therapy; Reason for Visit: Overweight/Obesity, BMI of 25 or higher and Fasting Glucose 100-125    HEMOGLOBIN A1C   2. Elevated glycohemoglobin  HEMOGLOBIN A1C    Comp Metabolic Panel    CBC WITHOUT DIFFERENTIAL   3. PCOS (polycystic ovarian syndrome)  Patient identified as having weight management issue.  Appropriate orders and " counseling given.    REFERRAL TO [facility] (HIP) Services Requested: Weight Management Program, Registered Dietitian for Medical Nutrition Therapy; Reason for Visit: Overweight/Obesity, BMI of 25 or higher and Fasting Glucose 100-125    Comp Metabolic Panel   4. Low HDL (under 40)  Comp Metabolic Panel    Lipid Profile    CBC WITHOUT DIFFERENTIAL   5. Obesity, Class II, BMI 35-39.9  Patient identified as having weight management issue.  Appropriate orders and counseling given.    REFERRAL TO [facility] (HIP) Services Requested: Weight Management Program, Registered Dietitian for Medical Nutrition Therapy; Reason for Visit: Overweight/Obesity, BMI of 25 or higher and Fasting Glucose 100-125   6. Idiopathic hypothyroidism  TSH   7. Vitamin D deficiency  VITAMIN D,25 HYDROXY   8. Tenderness over maxillary sinus  azithromycin (ZITHROMAX) 250 MG Tab    CBC WITHOUT DIFFERENTIAL         Followup: Return in about 3 months (around 12/19/2019), or if symptoms worsen or fail to improve.

## 2019-09-19 NOTE — LETTER
September 19, 2019        Patient: Minnie Edward   YOB: 1990   Date of Visit: 9/19/2019           To Whom It May Concern:    It is my medical opinion that Minnie Edward is off work this afternoon for medical reasons.    Sincerely,          Rubens Gaspar M.D.    [address] [id number] [phone] (Phone)  [phone] (Fax)

## 2019-09-22 RX ORDER — LEVOTHYROXINE SODIUM 0.03 MG/1
TABLET ORAL
Qty: 90 TAB | Refills: 0 | Status: SHIPPED | OUTPATIENT
Start: 2019-09-22 | End: 2019-11-05

## 2019-10-31 LAB
25(OH)D3+25(OH)D2 SERPL-MCNC: 18.3 NG/ML (ref 30–100)
ALBUMIN SERPL-MCNC: 4.3 G/DL (ref 3.5–5.5)
ALBUMIN/GLOB SERPL: 1.7 {RATIO} (ref 1.2–2.2)
ALP SERPL-CCNC: 55 IU/L (ref 39–117)
ALT SERPL-CCNC: 15 IU/L (ref 0–32)
AST SERPL-CCNC: 14 IU/L (ref 0–40)
BASOPHILS # BLD AUTO: 0 X10E3/UL (ref 0–0.2)
BASOPHILS NFR BLD AUTO: 0 %
BILIRUB SERPL-MCNC: <0.2 MG/DL (ref 0–1.2)
BUN SERPL-MCNC: 15 MG/DL (ref 6–20)
BUN/CREAT SERPL: 20 (ref 9–23)
CALCIUM SERPL-MCNC: 9.3 MG/DL (ref 8.7–10.2)
CHLORIDE SERPL-SCNC: 105 MMOL/L (ref 96–106)
CHOLEST SERPL-MCNC: 205 MG/DL (ref 100–199)
CO2 SERPL-SCNC: 19 MMOL/L (ref 20–29)
CREAT SERPL-MCNC: 0.74 MG/DL (ref 0.57–1)
EOSINOPHIL # BLD AUTO: 0.3 X10E3/UL (ref 0–0.4)
EOSINOPHIL NFR BLD AUTO: 3 %
ERYTHROCYTE [DISTWIDTH] IN BLOOD BY AUTOMATED COUNT: 14.1 % (ref 12.3–15.4)
GLOBULIN SER CALC-MCNC: 2.6 G/DL (ref 1.5–4.5)
GLUCOSE SERPL-MCNC: 93 MG/DL (ref 65–99)
HBA1C MFR BLD: 6 % (ref 4.8–5.6)
HCT VFR BLD AUTO: 43.4 % (ref 34–46.6)
HDLC SERPL-MCNC: 34 MG/DL
HGB BLD-MCNC: 14.4 G/DL (ref 11.1–15.9)
IMM GRANULOCYTES # BLD AUTO: 0 X10E3/UL (ref 0–0.1)
IMM GRANULOCYTES NFR BLD AUTO: 0 %
IMMATURE CELLS  115398: ABNORMAL
LABORATORY COMMENT REPORT: ABNORMAL
LDLC SERPL CALC-MCNC: 134 MG/DL (ref 0–99)
LYMPHOCYTES # BLD AUTO: 3.3 X10E3/UL (ref 0.7–3.1)
LYMPHOCYTES NFR BLD AUTO: 34 %
MCH RBC QN AUTO: 30.4 PG (ref 26.6–33)
MCHC RBC AUTO-ENTMCNC: 33.2 G/DL (ref 31.5–35.7)
MCV RBC AUTO: 92 FL (ref 79–97)
MONOCYTES # BLD AUTO: 0.6 X10E3/UL (ref 0.1–0.9)
MONOCYTES NFR BLD AUTO: 6 %
MORPHOLOGY BLD-IMP: ABNORMAL
NEUTROPHILS # BLD AUTO: 5.6 X10E3/UL (ref 1.4–7)
NEUTROPHILS NFR BLD AUTO: 57 %
NRBC BLD AUTO-RTO: ABNORMAL %
PLATELET # BLD AUTO: 438 X10E3/UL (ref 150–450)
POTASSIUM SERPL-SCNC: 4.5 MMOL/L (ref 3.5–5.2)
PROT SERPL-MCNC: 6.9 G/DL (ref 6–8.5)
RBC # BLD AUTO: 4.73 X10E6/UL (ref 3.77–5.28)
SODIUM SERPL-SCNC: 139 MMOL/L (ref 134–144)
TRIGL SERPL-MCNC: 183 MG/DL (ref 0–149)
TSH SERPL DL<=0.005 MIU/L-ACNC: 4.03 UIU/ML (ref 0.45–4.5)
VLDLC SERPL CALC-MCNC: 37 MG/DL (ref 5–40)
WBC # BLD AUTO: 9.7 X10E3/UL (ref 3.4–10.8)

## 2019-11-04 ENCOUNTER — APPOINTMENT (OUTPATIENT)
Dept: MEDICAL GROUP | Facility: MEDICAL CENTER | Age: 29
End: 2019-11-04
Payer: COMMERCIAL

## 2019-11-05 ENCOUNTER — OFFICE VISIT (OUTPATIENT)
Dept: MEDICAL GROUP | Facility: MEDICAL CENTER | Age: 29
End: 2019-11-05
Payer: COMMERCIAL

## 2019-11-05 VITALS
OXYGEN SATURATION: 97 % | BODY MASS INDEX: 38.76 KG/M2 | HEART RATE: 73 BPM | TEMPERATURE: 98.1 F | WEIGHT: 227 LBS | SYSTOLIC BLOOD PRESSURE: 112 MMHG | RESPIRATION RATE: 14 BRPM | DIASTOLIC BLOOD PRESSURE: 64 MMHG | HEIGHT: 64 IN

## 2019-11-05 DIAGNOSIS — E28.2 PCOS (POLYCYSTIC OVARIAN SYNDROME): Chronic | ICD-10-CM

## 2019-11-05 DIAGNOSIS — R73.03 PREDIABETES: ICD-10-CM

## 2019-11-05 DIAGNOSIS — E01.0 THYROMEGALY: ICD-10-CM

## 2019-11-05 DIAGNOSIS — F41.8 SITUATIONAL ANXIETY: ICD-10-CM

## 2019-11-05 DIAGNOSIS — E03.0 CONGENITAL HYPOTHYROIDISM WITH DIFFUSE GOITER: ICD-10-CM

## 2019-11-05 PROCEDURE — 99214 OFFICE O/P EST MOD 30 MIN: CPT | Performed by: FAMILY MEDICINE

## 2019-11-05 RX ORDER — ESCITALOPRAM OXALATE 10 MG/1
10 TABLET ORAL DAILY
Qty: 90 TAB | Refills: 3 | Status: SHIPPED | OUTPATIENT
Start: 2019-11-05 | End: 2020-11-09 | Stop reason: SDUPTHER

## 2019-11-05 RX ORDER — METFORMIN HYDROCHLORIDE 500 MG/1
500 TABLET, EXTENDED RELEASE ORAL
Qty: 90 TAB | Refills: 3 | Status: SHIPPED | OUTPATIENT
Start: 2019-11-05 | End: 2020-11-09 | Stop reason: SDUPTHER

## 2019-11-05 RX ORDER — LEVOTHYROXINE SODIUM 0.05 MG/1
50 TABLET ORAL
Qty: 90 TAB | Refills: 2 | Status: SHIPPED | OUTPATIENT
Start: 2019-11-05 | End: 2020-05-26 | Stop reason: SDUPTHER

## 2019-11-05 RX ORDER — ESCITALOPRAM OXALATE 10 MG/1
10 TABLET ORAL DAILY
Qty: 30 TAB | Refills: 6 | Status: SHIPPED | OUTPATIENT
Start: 2019-11-05 | End: 2019-11-05

## 2019-11-05 NOTE — PROGRESS NOTES
Chief Complaint   Patient presents with   • Hypothyroidism   • Obesity     PCOS   • Vitamin D Deficiency       Subjective:     HPI:   Minnie Edward presents today with the followin. Congenital hypothyroidism with diffuse goiter  She is still tired on this small dose of thyroid replacement, does note an improvement.  TSH is 4, not at goal.  Will increase the dose to 50 mcg.  Recheck 4 months.    2. Thyromegaly  The thyroid is smooth but continues to enlarge.  US of neck discussed and ordered.    3. PCOS (polycystic ovarian syndrome)  Stop the metformin to see if she could lower the A1C on her own, she did but notes PCOS symptoms worsening.      4. Prediabetes  She continues to show glucose intolerance but no elevated fasting glucose.  She is working hard on weight loss.    5. Situational anxiety  Lexapro continues to be helpful.  Marked family stressors.          Patient Active Problem List    Diagnosis Date Noted   • Situational anxiety 2019   • Menometrorrhagia 2019   • Prediabetes 2018   • Elevated glycohemoglobin 2017   • Vitamin D deficiency 2017   • Hypertriglyceridemia 2017   • IGT (impaired glucose tolerance) 2017   • Thyromegaly 2016   • Tension headache 2016   • Obesity, Class II, BMI 35-39.9, with comorbidity 2016   • Left-sided low back pain with left-sided sciatica 10/02/2015   • Seasonal allergies 2014   • PCOS (Polycystic Ovarian Syndrome) 12/15/2009       Current medicines (including changes today)  Current Outpatient Medications   Medication Sig Dispense Refill   • levothyroxine (SYNTHROID) 50 MCG Tab Take 1 Tab by mouth Every morning on an empty stomach. 90 Tab 2   • metFORMIN ER (GLUCOPHAGE XR) 500 MG TABLET SR 24 HR Take 1 Tab by mouth ONE-HALF HOUR AFTER DINNER. 90 Tab 3   • escitalopram (LEXAPRO) 10 MG Tab Take 1 Tab by mouth every day. 90 Tab 3   • tizanidine (ZANAFLEX) 4 MG Tab Take 1 Tab by mouth every  "bedtime. 30 Tab 4   • ibuprofen (MOTRIN) 800 MG Tab Take 1 Tab by mouth every 8 hours as needed for Moderate Pain or Headache. Take with meal and prilosec 20 mg 1 tab daily. 30 Tab 0     No current facility-administered medications for this visit.        No Known Allergies    ROS: As per HPI       Objective:     /64   Pulse 73   Temp 36.7 °C (98.1 °F)   Resp 14   Ht 1.626 m (5' 4\")   Wt 103 kg (227 lb)   SpO2 97%  Body mass index is 38.96 kg/m².    Physical Exam:  Constitutional: Well-developed and well-nourished. Not diaphoretic. No distress. Lucid and fluent.  Skin: Skin is warm and dry. No rash noted.  Head: Atraumatic without lesions.  Eyes: Conjunctivae and extraocular motions are normal. Pupils are equal, round, and reactive to light. No scleral icterus.   Mouth/Throat: Tongue normal. Oropharynx is clear and moist. Posterior pharynx without erythema or exudates.  Neck: Supple, trachea midline. Increased smooth thyromegaly present. No cervical or supraclavicular lymphadenopathy. No JVD or carotid bruits appreciated  Cardiovascular: Regular rate and rhythm.  Normal S1, S2 without murmur appreciated.  Chest: Effort normal. Clear to auscultation throughout. No adventitious sounds.   Extremities: No cyanosis, clubbing, erythema, nor edema.   Neurological: Alert and oriented x 3. No tremor appreciated.  Psychiatric:  Behavior, mood, and affect are appropriate.    Results from October 29 reviewed and discussed       Assessment and Plan:     29 y.o. female with the following issues:    1. Congenital hypothyroidism with diffuse goiter  US-SOFT TISSUES OF HEAD - NECK   2. Thyromegaly  levothyroxine (SYNTHROID) 50 MCG Tab    US-SOFT TISSUES OF HEAD - NECK   3. PCOS (polycystic ovarian syndrome)  metFORMIN ER (GLUCOPHAGE XR) 500 MG TABLET SR 24 HR   4. Prediabetes  metFORMIN ER (GLUCOPHAGE XR) 500 MG TABLET SR 24 HR   5. Situational anxiety  escitalopram (LEXAPRO) 10 MG Tab    DISCONTINUED: escitalopram " (LEXAPRO) 10 MG Tab         Followup: Return in about 4 months (around 3/5/2020), or if symptoms worsen or fail to improve.

## 2019-11-13 DIAGNOSIS — G44.209 TENSION HEADACHE: ICD-10-CM

## 2019-11-14 RX ORDER — IBUPROFEN 800 MG/1
800 TABLET ORAL EVERY 12 HOURS PRN
Qty: 60 TAB | Refills: 2 | Status: SHIPPED | OUTPATIENT
Start: 2019-11-14 | End: 2019-11-26 | Stop reason: SDUPTHER

## 2019-11-18 ENCOUNTER — HOSPITAL ENCOUNTER (OUTPATIENT)
Dept: RADIOLOGY | Facility: MEDICAL CENTER | Age: 29
End: 2019-11-18
Attending: FAMILY MEDICINE
Payer: COMMERCIAL

## 2019-11-18 DIAGNOSIS — E01.0 THYROMEGALY: ICD-10-CM

## 2019-11-18 DIAGNOSIS — E03.0 CONGENITAL HYPOTHYROIDISM WITH DIFFUSE GOITER: ICD-10-CM

## 2019-11-18 PROCEDURE — 76536 US EXAM OF HEAD AND NECK: CPT

## 2019-11-26 DIAGNOSIS — G44.209 TENSION HEADACHE: ICD-10-CM

## 2019-11-26 RX ORDER — IBUPROFEN 800 MG/1
800 TABLET ORAL EVERY 12 HOURS PRN
Qty: 60 TAB | Refills: 2 | Status: SHIPPED | OUTPATIENT
Start: 2019-11-26 | End: 2020-11-09 | Stop reason: SDUPTHER

## 2020-02-13 ENCOUNTER — OFFICE VISIT (OUTPATIENT)
Dept: MEDICAL GROUP | Facility: MEDICAL CENTER | Age: 30
End: 2020-02-13
Payer: COMMERCIAL

## 2020-02-13 VITALS
TEMPERATURE: 98.2 F | OXYGEN SATURATION: 96 % | HEART RATE: 84 BPM | BODY MASS INDEX: 38.8 KG/M2 | HEIGHT: 64 IN | SYSTOLIC BLOOD PRESSURE: 112 MMHG | RESPIRATION RATE: 16 BRPM | WEIGHT: 227.3 LBS | DIASTOLIC BLOOD PRESSURE: 70 MMHG

## 2020-02-13 DIAGNOSIS — J02.9 ACUTE PHARYNGITIS, UNSPECIFIED ETIOLOGY: ICD-10-CM

## 2020-02-13 DIAGNOSIS — R05.3 PERSISTENT COUGH: ICD-10-CM

## 2020-02-13 DIAGNOSIS — R59.0 CERVICAL ADENOPATHY: ICD-10-CM

## 2020-02-13 PROCEDURE — 99213 OFFICE O/P EST LOW 20 MIN: CPT | Performed by: FAMILY MEDICINE

## 2020-02-13 RX ORDER — BENZONATATE 100 MG/1
100 CAPSULE ORAL 3 TIMES DAILY PRN
Qty: 60 CAP | Refills: 0 | Status: SHIPPED | OUTPATIENT
Start: 2020-02-13 | End: 2020-11-09

## 2020-02-13 RX ORDER — AMOXICILLIN 875 MG/1
875 TABLET, COATED ORAL 2 TIMES DAILY
Qty: 14 TAB | Refills: 0 | Status: SHIPPED | OUTPATIENT
Start: 2020-02-13 | End: 2020-02-20

## 2020-02-13 RX ORDER — PROMETHAZINE HYDROCHLORIDE AND CODEINE PHOSPHATE 6.25; 1 MG/5ML; MG/5ML
5 SYRUP ORAL EVERY 4 HOURS PRN
Qty: 180 ML | Refills: 0 | Status: SHIPPED | OUTPATIENT
Start: 2020-02-13 | End: 2020-02-20

## 2020-02-13 ASSESSMENT — PATIENT HEALTH QUESTIONNAIRE - PHQ9: CLINICAL INTERPRETATION OF PHQ2 SCORE: 0

## 2020-02-13 NOTE — PROGRESS NOTES
Chief Complaint   Patient presents with   • Pharyngitis     x 4 days   • Cough     x 3 days       Subjective:     HPI:   Minnie Edward presents today with the followin. Acute pharyngitis, unspecified etiology  She has had 5 days of sore throat and cough.  She has been on three over the counter medication.  She is unable to sleep due to cough.  She is getting chills and fever.  Prescription cough medication prescribed.  Taking mucinex, vicks, dayquil, not much help.  Asks for tessalon perles for daytime.     2. Cervical adenopathy  She is having anterior adenopathy.  This is tender.  This appears bacterial.      Patient Active Problem List    Diagnosis Date Noted   • Situational anxiety 2019   • Menometrorrhagia 2019   • Prediabetes 2018   • Elevated glycohemoglobin 2017   • Vitamin D deficiency 2017   • Hypertriglyceridemia 2017   • IGT (impaired glucose tolerance) 2017   • Thyromegaly 2016   • Tension headache 2016   • Obesity, Class II, BMI 35-39.9, with comorbidity 2016   • Left-sided low back pain with left-sided sciatica 10/02/2015   • Seasonal allergies 2014   • PCOS (Polycystic Ovarian Syndrome) 12/15/2009       Current medicines (including changes today)  Current Outpatient Medications   Medication Sig Dispense Refill   • amoxicillin (AMOXIL) 875 MG tablet Take 1 Tab by mouth 2 times a day for 7 days. 14 Tab 0   • promethazine-codeine (PHENERGAN-CODEINE) 6.25-10 MG/5ML Syrup Take 5 mL by mouth every four hours as needed for Cough for up to 7 days. 180 mL 0   • benzonatate (TESSALON) 100 MG Cap Take 1 Cap by mouth 3 times a day as needed for Cough. 60 Cap 0   • ibuprofen (MOTRIN) 800 MG Tab Take 1 Tab by mouth every 12 hours as needed for Headache or Inflammation. Take with food 60 Tab 2   • levothyroxine (SYNTHROID) 50 MCG Tab Take 1 Tab by mouth Every morning on an empty stomach. 90 Tab 2   • metFORMIN ER (GLUCOPHAGE  "XR) 500 MG TABLET SR 24 HR Take 1 Tab by mouth ONE-HALF HOUR AFTER DINNER. 90 Tab 3   • escitalopram (LEXAPRO) 10 MG Tab Take 1 Tab by mouth every day. 90 Tab 3   • tizanidine (ZANAFLEX) 4 MG Tab Take 1 Tab by mouth every bedtime. 30 Tab 4     No current facility-administered medications for this visit.        No Known Allergies    ROS: As per HPI       Objective:     /70 (BP Location: Right arm, Patient Position: Sitting, BP Cuff Size: Adult)   Pulse 84   Temp 36.8 °C (98.2 °F) (Temporal)   Resp 16   Ht 1.626 m (5' 4\")   Wt 103.1 kg (227 lb 4.8 oz)   SpO2 96%  Body mass index is 39.02 kg/m².    Physical Exam:  Constitutional: Well-developed and well-nourished. Not diaphoretic. No distress. Lucid and fluent.  Skin: Skin is warm and dry. No rash noted.  Head: Atraumatic without lesions.  Eyes: Conjunctivae and extraocular motions are normal. Pupils are equal, round, and reactive to light. No scleral icterus.   Nose: Nares patent. Mucosa with marked edema and erythema.   Mouth/Throat: Tongue normal. Oropharynx is clear and moist. Posterior pharynx with marked erythema and mild edema, no exudates.  Airway is patent.  Neck: Supple. No thyromegaly present. She has marked tender anterior cervical lymphadenopathy.   Cardiovascular: Regular rate and rhythm.  Normal S1, S2 without murmur appreciated.  Chest: Effort normal. Clear to auscultation throughout. No adventitious sounds.   Extremities: No cyanosis, clubbing, erythema, nor edema.   Neurological: Alert and oriented x 3.   Psychiatric:  Behavior, mood, and affect are appropriate.       Assessment and Plan:     29 y.o. female with the following issues:    1. Acute pharyngitis, unspecified etiology  amoxicillin (AMOXIL) 875 MG tablet    promethazine-codeine (PHENERGAN-CODEINE) 6.25-10 MG/5ML Syrup   2. Cervical adenopathy  amoxicillin (AMOXIL) 875 MG tablet    promethazine-codeine (PHENERGAN-CODEINE) 6.25-10 MG/5ML Syrup   3. Persistent cough  benzonatate " (TESSALON) 100 MG Cap     Letter for work is written.    Followup: Return in about 4 months (around 6/13/2020), or if symptoms worsen or fail to improve.

## 2020-02-13 NOTE — LETTER
February 13, 2020       Patient: Minnie Edward   YOB: 1990   Date of Visit: 2/13/2020         To Whom It May Concern:    It is my medical opinion that Minnie Edward is off work 2/13/2020 due to medical reasons.    If you have any questions or concerns, please don't hesitate to call [phone]    Sincerely,          Rubens Gaspar M.D.

## 2020-05-26 ENCOUNTER — OFFICE VISIT (OUTPATIENT)
Dept: MEDICAL GROUP | Facility: MEDICAL CENTER | Age: 30
End: 2020-05-26
Payer: COMMERCIAL

## 2020-05-26 VITALS
HEART RATE: 77 BPM | TEMPERATURE: 98.4 F | WEIGHT: 230 LBS | DIASTOLIC BLOOD PRESSURE: 62 MMHG | OXYGEN SATURATION: 95 % | SYSTOLIC BLOOD PRESSURE: 96 MMHG | RESPIRATION RATE: 16 BRPM | BODY MASS INDEX: 39.27 KG/M2 | HEIGHT: 64 IN

## 2020-05-26 DIAGNOSIS — E78.1 HYPERTRIGLYCERIDEMIA: ICD-10-CM

## 2020-05-26 DIAGNOSIS — R73.03 PREDIABETES: ICD-10-CM

## 2020-05-26 DIAGNOSIS — F41.8 SITUATIONAL ANXIETY: ICD-10-CM

## 2020-05-26 DIAGNOSIS — E03.0 CONGENITAL HYPOTHYROIDISM WITH DIFFUSE GOITER: ICD-10-CM

## 2020-05-26 DIAGNOSIS — J30.2 SEASONAL ALLERGIES: ICD-10-CM

## 2020-05-26 DIAGNOSIS — E01.0 THYROMEGALY: ICD-10-CM

## 2020-05-26 DIAGNOSIS — R59.0 CERVICAL ADENOPATHY: ICD-10-CM

## 2020-05-26 DIAGNOSIS — R73.09 ELEVATED GLYCOHEMOGLOBIN: ICD-10-CM

## 2020-05-26 DIAGNOSIS — E55.9 VITAMIN D DEFICIENCY: ICD-10-CM

## 2020-05-26 DIAGNOSIS — R73.02 IGT (IMPAIRED GLUCOSE TOLERANCE): ICD-10-CM

## 2020-05-26 PROCEDURE — 99214 OFFICE O/P EST MOD 30 MIN: CPT | Performed by: FAMILY MEDICINE

## 2020-05-26 RX ORDER — TRIAMCINOLONE ACETONIDE 40 MG/ML
40 INJECTION, SUSPENSION INTRA-ARTICULAR; INTRAMUSCULAR ONCE
Status: COMPLETED | OUTPATIENT
Start: 2020-05-26 | End: 2020-05-26

## 2020-05-26 RX ORDER — ALPRAZOLAM 0.25 MG/1
0.25 TABLET ORAL 3 TIMES DAILY PRN
Qty: 21 TAB | Refills: 0 | Status: SHIPPED | OUTPATIENT
Start: 2020-05-26 | End: 2020-11-09 | Stop reason: SDUPTHER

## 2020-05-26 RX ORDER — LEVOTHYROXINE SODIUM 0.05 MG/1
50 TABLET ORAL
Qty: 90 TAB | Refills: 2 | Status: SHIPPED | OUTPATIENT
Start: 2020-05-26 | End: 2021-04-05 | Stop reason: SDUPTHER

## 2020-05-26 RX ADMIN — TRIAMCINOLONE ACETONIDE 40 MG: 40 INJECTION, SUSPENSION INTRA-ARTICULAR; INTRAMUSCULAR at 10:01

## 2020-05-26 ASSESSMENT — FIBROSIS 4 INDEX: FIB4 SCORE: 0.26

## 2020-05-26 NOTE — PROGRESS NOTES
Chief Complaint   Patient presents with   • Neck Pain     tender right sided node   • Seasonal Allergies   • Hypothyroidism   • Prediabetes   • Anxiety       Subjective:     HPI:   Minnie Edward presents today with the followin. Cervical adenopathy  Right posterior cervical region enlarged and tender node.  There are also a few small shotty anterior cervical nodes.  There is tenderness of the musculature.  There is mild thyromegaly without mass.  This appears to be unchanged.  This is been tender, waxing and waning.  It is more bothersome when her allergies are more troubling.  I feel this is seasonal allergy related overall.  She would like an injection of Kenalog.  This is been extremely helpful to her in the past.  Patient is afebrile and without respiratory distress, wheezing.  She is having cough and allergy symptoms but unchanged from past years.    2. Seasonal allergies  Patient has been trying to find her seasonal allergies using over-the-counter remedies.  She is using allegra, eye drops, flonase.  Requests a Kenalog injection today.  This is been very helpful in the past.  Denies fever chills.  Denies malaise or shortness of breath.  Primarily she is having itching of eyes, itching ears, runny nose and sneezing, irritated throat with some hoarseness that comes and goes.  This is been present for several weeks.  Kenalog injection is administered today.    3. Thyromegaly/Congenital hypothyroidism with diffuse goiter  Patient does have hypothyroidism.  She has a diffuse relatively firm goiter without mass.  Follow-up lab orders are discussed and placed.    4. Prediabetes/Elevated glycohemoglobin/IGT (impaired glucose tolerance)  Patient has been finding it difficult to eat correctly at home.  She has been off work and home with her father and brother children and  for 5 weeks.  She states it has been quite stressful and difficult.  She has found herself eating quite a bit of  comfort food.  She feels work is helping her significantly.  She is moving more.  Follow-up lab orders are needed.  Orders are discussed and placed.  I have asked her to get these completed in a few months.    5. Vitamin D deficiency  Patient continues vitamin D supplementation.  No history of fracture.  Follow-up lab order discussed and placed.    6. Hypertriglyceridemia  Follow-up lab orders discussed and placed.  Patient is on no lipid-lowering medication at this time.  Lipids have been mildly elevated but overall favorable.  Patient is a never smoker.  Denies chest pain or shortness of breath.    7. Situational anxiety  Her situation is very anxiety provoking.  She still has a close family relationship with her sister.  Her father and brother do not like that.  She is often caught between a rock and a hard place.  She does not want to lose any of her family.  She is being very careful with social distancing.  She is trying to keep her father and brother from going out too much.  They were wanting to travel to [place] taking 1 of her sons.  She has categorically forbidden this.  I think that is king as [place]'s pandemic numbers are rising rather quickly.  She states the alprazolam is quite helpful.  She uses this as needed only.  Her last prescription was several months ago and she is just coming to the end of that.   review shows no inconsistencies.  Denies somnolence from the medication.      Patient Active Problem List    Diagnosis Date Noted   • Situational anxiety 05/03/2019   • Menometrorrhagia 05/03/2019   • Prediabetes 07/31/2018   • Elevated glycohemoglobin 09/21/2017   • Vitamin D deficiency 09/21/2017   • Hypertriglyceridemia 09/21/2017   • IGT (impaired glucose tolerance) 05/31/2017   • Thyromegaly 12/20/2016   • Tension headache 12/20/2016   • Obesity, Class II, BMI 35-39.9, with comorbidity 09/29/2016   • Left-sided low back pain with left-sided sciatica 10/02/2015   • Seasonal allergies  "09/26/2014   • PCOS (Polycystic Ovarian Syndrome) 12/15/2009       Current medicines (including changes today)  Current Outpatient Medications   Medication Sig Dispense Refill   • levothyroxine (SYNTHROID) 50 MCG Tab Take 1 Tab by mouth Every morning on an empty stomach. 90 Tab 2   • ALPRAZolam (XANAX) 0.25 MG Tab Take 1 Tab by mouth 3 times a day as needed for Anxiety for up to 7 days. 21 Tab 0   • benzonatate (TESSALON) 100 MG Cap Take 1 Cap by mouth 3 times a day as needed for Cough. 60 Cap 0   • ibuprofen (MOTRIN) 800 MG Tab Take 1 Tab by mouth every 12 hours as needed for Headache or Inflammation. Take with food 60 Tab 2   • metFORMIN ER (GLUCOPHAGE XR) 500 MG TABLET SR 24 HR Take 1 Tab by mouth ONE-HALF HOUR AFTER DINNER. 90 Tab 3   • escitalopram (LEXAPRO) 10 MG Tab Take 1 Tab by mouth every day. 90 Tab 3   • tizanidine (ZANAFLEX) 4 MG Tab Take 1 Tab by mouth every bedtime. 30 Tab 4     No current facility-administered medications for this visit.        No Known Allergies    ROS: As per HPI       Objective:     BP (!) 96/62   Pulse 77   Temp 36.9 °C (98.4 °F)   Resp 16   Ht 1.626 m (5' 4\")   Wt 104.3 kg (230 lb)   SpO2 95%  Body mass index is 39.48 kg/m².    Physical Exam:  Constitutional: Well-developed and well-nourished. Not diaphoretic. No distress. Lucid and fluent.  Skin: Skin is warm and dry. No rash noted.  Head: Atraumatic without lesions.  Eyes: Conjunctivae and extraocular motions are normal. Pupils are equal, round, and reactive to light. No scleral icterus.   Nose: Nares patent. Mucosa without edema or erythema. No discharge. No facial tenderness.  Mouth/Throat: Tongue normal. Oropharynx is clear and moist. Posterior pharynx without erythema or exudates.  Neck: Supple, trachea midline. No thyromegaly present. Right shotty cervical lymphadenopathy. No JVD or carotid bruits appreciated  Cardiovascular: Regular rate and rhythm.  Normal S1, S2 without murmur appreciated.  Chest: Effort normal. " Clear to auscultation throughout. No adventitious sounds. Good air movement  Extremities: No cyanosis, clubbing, erythema, nor edema.   Neurological: Alert and oriented x 3.   Psychiatric:  Behavior, mood, and affect are appropriate.       Assessment and Plan:     30 y.o. female with the following issues:    1. Cervical adenopathy  triamcinolone acetonide (KENALOG-40) injection 40 mg    CBC WITHOUT DIFFERENTIAL   2. Seasonal allergies  triamcinolone acetonide (KENALOG-40) injection 40 mg    CBC WITHOUT DIFFERENTIAL   3. Thyromegaly  levothyroxine (SYNTHROID) 50 MCG Tab    TSH   4. Congenital hypothyroidism with diffuse goiter  TSH   5. Prediabetes  Comp Metabolic Panel    Lipid Profile    HEMOGLOBIN A1C   6. Elevated glycohemoglobin  HEMOGLOBIN A1C   7. IGT (impaired glucose tolerance)  HEMOGLOBIN A1C   8. Vitamin D deficiency  VITAMIN D,25 HYDROXY   9. Hypertriglyceridemia  Comp Metabolic Panel    Lipid Profile   10. Situational anxiety  ALPRAZolam (XANAX) 0.25 MG Tab         Followup: Return in about 4 months (around 9/26/2020), or if symptoms worsen or fail to improve.

## 2020-08-12 ENCOUNTER — OFFICE VISIT (OUTPATIENT)
Dept: URGENT CARE | Facility: PHYSICIAN GROUP | Age: 30
End: 2020-08-12
Payer: COMMERCIAL

## 2020-08-12 VITALS
WEIGHT: 225 LBS | HEIGHT: 64 IN | DIASTOLIC BLOOD PRESSURE: 72 MMHG | TEMPERATURE: 98.4 F | HEART RATE: 76 BPM | SYSTOLIC BLOOD PRESSURE: 108 MMHG | OXYGEN SATURATION: 97 % | RESPIRATION RATE: 17 BRPM | BODY MASS INDEX: 38.41 KG/M2

## 2020-08-12 DIAGNOSIS — R30.0 DYSURIA: ICD-10-CM

## 2020-08-12 LAB
APPEARANCE UR: CLEAR
BILIRUB UR STRIP-MCNC: NORMAL MG/DL
COLOR UR AUTO: NORMAL
GLUCOSE UR STRIP.AUTO-MCNC: NORMAL MG/DL
INT CON NEG: NEGATIVE
INT CON POS: POSITIVE
KETONES UR STRIP.AUTO-MCNC: NORMAL MG/DL
LEUKOCYTE ESTERASE UR QL STRIP.AUTO: NORMAL
NITRITE UR QL STRIP.AUTO: NORMAL
PH UR STRIP.AUTO: 5.5 [PH] (ref 5–8)
POC URINE PREGNANCY TEST: NORMAL
PROT UR QL STRIP: NORMAL MG/DL
RBC UR QL AUTO: NORMAL
SP GR UR STRIP.AUTO: 1.03
UROBILINOGEN UR STRIP-MCNC: NORMAL MG/DL

## 2020-08-12 PROCEDURE — 81025 URINE PREGNANCY TEST: CPT | Performed by: PHYSICIAN ASSISTANT

## 2020-08-12 PROCEDURE — 81002 URINALYSIS NONAUTO W/O SCOPE: CPT | Performed by: PHYSICIAN ASSISTANT

## 2020-08-12 PROCEDURE — 99214 OFFICE O/P EST MOD 30 MIN: CPT | Performed by: PHYSICIAN ASSISTANT

## 2020-08-12 RX ORDER — NITROFURANTOIN 25; 75 MG/1; MG/1
100 CAPSULE ORAL EVERY 12 HOURS
Qty: 10 CAP | Refills: 0 | Status: SHIPPED | OUTPATIENT
Start: 2020-08-12 | End: 2020-08-17

## 2020-08-12 RX ORDER — PHENAZOPYRIDINE HYDROCHLORIDE 200 MG/1
200 TABLET, FILM COATED ORAL 3 TIMES DAILY
Qty: 6 TAB | Refills: 0 | Status: SHIPPED | OUTPATIENT
Start: 2020-08-12 | End: 2020-08-14

## 2020-08-12 ASSESSMENT — ENCOUNTER SYMPTOMS
BACK PAIN: 0
PALPITATIONS: 0
FLANK PAIN: 0
NAUSEA: 0
SHORTNESS OF BREATH: 0
VOMITING: 0
CHILLS: 0
ABDOMINAL PAIN: 0
COUGH: 0
FEVER: 0

## 2020-08-12 ASSESSMENT — FIBROSIS 4 INDEX: FIB4 SCORE: 0.26

## 2020-08-13 NOTE — PROGRESS NOTES
Subjective:   Minnie Edward is a 30 y.o. female who presents for Dysuria (onset monday, cramping today)      Dysuria   This is a new problem. The current episode started in the past 7 days. The problem occurs every urination. The problem has been unchanged. The pain is moderate. Associated symptoms include frequency, hematuria and urgency. Pertinent negatives include no chills, flank pain, nausea or vomiting. She has tried nothing for the symptoms.       Review of Systems   Constitutional: Negative for chills and fever.   Respiratory: Negative for cough and shortness of breath.    Cardiovascular: Negative for chest pain and palpitations.   Gastrointestinal: Negative for abdominal pain, nausea and vomiting.   Genitourinary: Positive for dysuria, frequency, hematuria and urgency. Negative for flank pain.   Musculoskeletal: Negative for back pain.   All other systems reviewed and are negative.      Medications:    • benzonatate Caps  • escitalopram Tabs  • ibuprofen Tabs  • levothyroxine Tabs  • metFORMIN ER Tb24  • nitrofurantoin Caps  • phenazopyridine Tabs  • tizanidine Tabs    Allergies: Patient has no known allergies.    Problem List: Minnie Edward has PCOS (Polycystic Ovarian Syndrome); Seasonal allergies; Left-sided low back pain with left-sided sciatica; Obesity, Class II, BMI 35-39.9, with comorbidity; Thyromegaly; Tension headache; IGT (impaired glucose tolerance); Elevated glycohemoglobin; Vitamin D deficiency; Hypertriglyceridemia; Prediabetes; Situational anxiety; and Menometrorrhagia on their problem list.    Surgical History:  No past surgical history on file.    Past Social Hx: Minnie Edward  reports that she has never smoked. She has never used smokeless tobacco. She reports that she does not drink alcohol or use drugs.     Past Family Hx:  Minnie Edward family history includes Diabetes in her brother, father, and mother; Heart Disease in  "her father; Hypertension in her father; Other in her sister; Stroke in her paternal grandmother; Thyroid in her mother.     Problem list, medications, and allergies reviewed by myself today in Epic.     Objective:     Blood Pressure 108/72   Pulse 76   Temperature 36.9 °C (98.4 °F)   Respiration 17   Height 1.626 m (5' 4\")   Weight 102.1 kg (225 lb)   Oxygen Saturation 97%   Body Mass Index 38.62 kg/m²     Physical Exam  Vitals signs reviewed.   Constitutional:       Appearance: She is well-developed.   HENT:      Head: Normocephalic and atraumatic.      Right Ear: External ear normal.      Left Ear: External ear normal.      Nose: Nose normal.   Neck:      Musculoskeletal: Normal range of motion and neck supple.   Cardiovascular:      Rate and Rhythm: Normal rate and regular rhythm.      Heart sounds: Normal heart sounds.   Pulmonary:      Effort: Pulmonary effort is normal.      Breath sounds: Normal breath sounds.   Abdominal:      General: Bowel sounds are normal. There is no distension.      Palpations: Abdomen is soft. There is no mass.      Tenderness: There is no abdominal tenderness. There is no right CVA tenderness, left CVA tenderness, guarding or rebound.      Hernia: No hernia is present.   Skin:     General: Skin is warm and dry.   Neurological:      Mental Status: She is alert and oriented to person, place, and time.   Psychiatric:         Behavior: Behavior normal.         Thought Content: Thought content normal.         Judgment: Judgment normal.         Assessment/Plan:     Medical Decision Making/Comments   Pt is a 30 yr old female who presents for evaluation of dysuria.  Pt states she has had dysuria, frequency, and urgency for 3 days.  Denies fevers, flank pain/chills, nausea/vomiting, or vaginal discharge.  Pt is not pregnant, diabetic or immunocompromised.  No use of catheters.  Vital signs normal.  Pt does not appear ill or altered mental status.  There is no PTP of the abdomen or CVA " tenderness.  Symptoms consistent with previous UTI     Diagnosis and associated orders     1. Dysuria  POCT Urinalysis    nitrofurantoin (MACROBID) 100 MG Cap    phenazopyridine (PYRIDIUM) 200 MG Tab    POCT Pregnancy              Differential diagnosis, natural history, supportive care, and indications for immediate follow-up discussed.    Advised the patient to follow-up with the primary care physician for recheck, reevaluation, and consideration of further management.    Please note that this dictation was created using voice recognition software. I have made a reasonable attempt to correct obvious errors, but I expect that there are errors of grammar and possibly content that I did not discover before finalizing the note.

## 2020-11-09 ENCOUNTER — OFFICE VISIT (OUTPATIENT)
Dept: MEDICAL GROUP | Facility: MEDICAL CENTER | Age: 30
End: 2020-11-09
Payer: COMMERCIAL

## 2020-11-09 VITALS
RESPIRATION RATE: 16 BRPM | OXYGEN SATURATION: 100 % | DIASTOLIC BLOOD PRESSURE: 68 MMHG | WEIGHT: 233 LBS | TEMPERATURE: 98.6 F | SYSTOLIC BLOOD PRESSURE: 110 MMHG | HEIGHT: 64 IN | HEART RATE: 86 BPM | BODY MASS INDEX: 39.78 KG/M2

## 2020-11-09 DIAGNOSIS — E28.2 PCOS (POLYCYSTIC OVARIAN SYNDROME): Chronic | ICD-10-CM

## 2020-11-09 DIAGNOSIS — R10.13 EPIGASTRIC ABDOMINAL PAIN: ICD-10-CM

## 2020-11-09 DIAGNOSIS — R73.03 PREDIABETES: ICD-10-CM

## 2020-11-09 DIAGNOSIS — G44.209 TENSION HEADACHE: ICD-10-CM

## 2020-11-09 DIAGNOSIS — K21.9 GASTROESOPHAGEAL REFLUX DISEASE, UNSPECIFIED WHETHER ESOPHAGITIS PRESENT: ICD-10-CM

## 2020-11-09 DIAGNOSIS — R73.09 ELEVATED GLYCOHEMOGLOBIN: ICD-10-CM

## 2020-11-09 DIAGNOSIS — M62.830 MUSCLE SPASM OF BACK: ICD-10-CM

## 2020-11-09 DIAGNOSIS — N92.1 MENOMETRORRHAGIA: ICD-10-CM

## 2020-11-09 DIAGNOSIS — Z84.89 FAMILY HISTORY OF BRAIN TUMOR: ICD-10-CM

## 2020-11-09 DIAGNOSIS — E55.9 VITAMIN D DEFICIENCY: ICD-10-CM

## 2020-11-09 DIAGNOSIS — R73.02 IGT (IMPAIRED GLUCOSE TOLERANCE): ICD-10-CM

## 2020-11-09 DIAGNOSIS — E78.6 LOW HDL (UNDER 40): ICD-10-CM

## 2020-11-09 DIAGNOSIS — E78.1 HYPERTRIGLYCERIDEMIA: ICD-10-CM

## 2020-11-09 DIAGNOSIS — G44.52 NEW DAILY PERSISTENT HEADACHE: ICD-10-CM

## 2020-11-09 DIAGNOSIS — E03.0 CONGENITAL HYPOTHYROIDISM WITH DIFFUSE GOITER: ICD-10-CM

## 2020-11-09 DIAGNOSIS — F41.8 SITUATIONAL ANXIETY: ICD-10-CM

## 2020-11-09 PROCEDURE — 99214 OFFICE O/P EST MOD 30 MIN: CPT | Performed by: FAMILY MEDICINE

## 2020-11-09 RX ORDER — ESCITALOPRAM OXALATE 10 MG/1
10 TABLET ORAL DAILY
Qty: 90 TAB | Refills: 3 | Status: SHIPPED | OUTPATIENT
Start: 2020-11-09 | End: 2021-04-05

## 2020-11-09 RX ORDER — TIZANIDINE 4 MG/1
4 TABLET ORAL
Qty: 30 TAB | Refills: 6 | Status: SHIPPED | OUTPATIENT
Start: 2020-11-09 | End: 2021-10-07

## 2020-11-09 RX ORDER — IBUPROFEN 800 MG/1
800 TABLET ORAL EVERY 12 HOURS PRN
Qty: 180 TAB | Refills: 3 | Status: SHIPPED | OUTPATIENT
Start: 2020-11-09 | End: 2022-09-09

## 2020-11-09 RX ORDER — OMEPRAZOLE 40 MG/1
40 CAPSULE, DELAYED RELEASE ORAL DAILY
Qty: 90 CAP | Refills: 3 | Status: SHIPPED | OUTPATIENT
Start: 2020-11-09 | End: 2023-09-25 | Stop reason: SDUPTHER

## 2020-11-09 RX ORDER — ALPRAZOLAM 0.25 MG/1
0.25 TABLET ORAL 3 TIMES DAILY PRN
Qty: 21 TAB | Refills: 0 | Status: SHIPPED | OUTPATIENT
Start: 2020-11-09 | End: 2021-04-05 | Stop reason: SDUPTHER

## 2020-11-09 RX ORDER — METFORMIN HYDROCHLORIDE 500 MG/1
500 TABLET, EXTENDED RELEASE ORAL
Qty: 90 TAB | Refills: 3 | Status: SHIPPED | OUTPATIENT
Start: 2020-11-09 | End: 2022-03-03 | Stop reason: SDUPTHER

## 2020-11-09 ASSESSMENT — FIBROSIS 4 INDEX: FIB4 SCORE: 0.26

## 2020-11-09 NOTE — PROGRESS NOTES
Chief Complaint   Patient presents with   • Elevated Glucose   • Headache   • Hypothyroidism   • Vitamin D Deficiency   • Hyperlipidemia       Subjective:     HPI:   Minnie Edward presents today with the followin. Epigastric abdominal pain/Gastroesophageal reflux disease, unspecified whether esophagitis present  She has had 4-6 weeks of reflux and abdominal pain.  She has been having to take the motrin much more often due to escalating headaches.  Denies hematemesis or visible blood in the stool or black stool.      2. Congenital hypothyroidism with diffuse goiter  Needs follow up lab testing.  Is taking daily on an empty stomach.  Orders discussed and placed.    3. Prediabetes/Elevated glycohemoglobin/IGT (impaired glucose tolerance)  She has moderate glycohemoglobin elevation and some intermittent elevated fasting glucose.  Needs follow up orders. Not following diet well due to pandemic and stress.  Orders discussed and placed.  She continues the metformin.     4. Vitamin D deficiency  She is deficient, she is using supplementation but not sure if it is absorbing.  Orders discussed and placed.    5. Hypertriglyceridemia/Low HDL (under 40)  Lab orders discussed and placed.  Denies chest pain or pressure.      6. Menometrorrhagia/PCOS (polycystic ovarian syndrome)  Is helped by the metformin.  She continues to tolerate this well. The PCOS is better.    7. New daily persistent headache/Family history of brain tumor/Tension headache  Headaches have escalated. Now getting weekly or more frequently.  Mother,  older brother and sister all had brain tumors.  Her brother  of his. She denies weakness or change in vision.      8. Situational anxiety  She has escalating situational anxiety.  The lexapro and alprazolam are helpful.  PDMP review shows no inconsistencies.  She takes the lexapro daily and the alprazolam only as needed.    9. Muscle spasm of back  She uses the muscle relaxants  "as needed only.  - tizanidine (ZANAFLEX) 4 MG Tab; Take 1 Tab by mouth every bedtime.  Dispense: 30 Tab; Refill: 6      Patient Active Problem List    Diagnosis Date Noted   • Situational anxiety 05/03/2019   • Menometrorrhagia 05/03/2019   • Prediabetes 07/31/2018   • Elevated glycohemoglobin 09/21/2017   • Vitamin D deficiency 09/21/2017   • Hypertriglyceridemia 09/21/2017   • IGT (impaired glucose tolerance) 05/31/2017   • Thyromegaly 12/20/2016   • Tension headache 12/20/2016   • Obesity, Class II, BMI 35-39.9, with comorbidity 09/29/2016   • Left-sided low back pain with left-sided sciatica 10/02/2015   • Seasonal allergies 09/26/2014   • PCOS (Polycystic Ovarian Syndrome) 12/15/2009       Current medicines (including changes today)  Current Outpatient Medications   Medication Sig Dispense Refill   • omeprazole (PRILOSEC) 40 MG delayed-release capsule Take 1 Cap by mouth every day. 90 Cap 3   • metFORMIN ER (GLUCOPHAGE XR) 500 MG TABLET SR 24 HR Take 1 Tab by mouth ONE-HALF HOUR AFTER DINNER. 90 Tab 3   • escitalopram (LEXAPRO) 10 MG Tab Take 1 Tab by mouth every day. 90 Tab 3   • ibuprofen (MOTRIN) 800 MG Tab Take 1 Tab by mouth every 12 hours as needed for Headache or Inflammation. Take with food 180 Tab 3   • tizanidine (ZANAFLEX) 4 MG Tab Take 1 Tab by mouth every bedtime. 30 Tab 6   • ALPRAZolam (XANAX) 0.25 MG Tab Take 1 Tab by mouth 3 times a day as needed for Anxiety for up to 7 days. 21 Tab 0   • levothyroxine (SYNTHROID) 50 MCG Tab Take 1 Tab by mouth Every morning on an empty stomach. 90 Tab 2     No current facility-administered medications for this visit.        No Known Allergies    ROS: As per HPI       Objective:     /68   Pulse 86   Temp 37 °C (98.6 °F)   Resp 16   Ht 1.626 m (5' 4\")   Wt 105.7 kg (233 lb)   SpO2 100%  Body mass index is 39.99 kg/m².    Physical Exam:  Constitutional: Well-developed and well-nourished. Not diaphoretic. No distress. Lucid and fluent.  Patient, " physician and staff all wearing masks.  Skin: Skin is warm and dry. No rash noted.  Head: Atraumatic without lesions.  Eyes: Conjunctivae and extraocular motions are normal. Pupils are equal, round, and reactive to light. No scleral icterus.   Neck: Supple, trachea midline. No thyromegaly present. No cervical or supraclavicular lymphadenopathy. No JVD or carotid bruits appreciated  Cardiovascular: Regular rate and rhythm.  Normal S1, S2 without murmur appreciated.  Chest: Effort normal. Clear to auscultation throughout. No adventitious sounds.   Abdomen: Soft, non tender, and without distention. Active bowel sounds in all four quadrants. No rebound, guarding, masses or hepatosplenomegaly.  Extremities: No cyanosis, clubbing, erythema, nor edema.   Neurological: Alert and oriented x 3. No tremor appreciated.  Psychiatric:  Behavior, mood, and affect are appropriate.       Assessment and Plan:     30 y.o. female with the following issues:    1. Epigastric abdominal pain  omeprazole (PRILOSEC) 40 MG delayed-release capsule   2. Gastroesophageal reflux disease, unspecified whether esophagitis present  omeprazole (PRILOSEC) 40 MG delayed-release capsule    CBC WITHOUT DIFFERENTIAL   3. Congenital hypothyroidism with diffuse goiter  TSH   4. Prediabetes  Comp Metabolic Panel    HEMOGLOBIN A1C    metFORMIN ER (GLUCOPHAGE XR) 500 MG TABLET SR 24 HR   5. Elevated glycohemoglobin  Comp Metabolic Panel    HEMOGLOBIN A1C   6. IGT (impaired glucose tolerance)  Comp Metabolic Panel    HEMOGLOBIN A1C   7. Vitamin D deficiency  VITAMIN D,25 HYDROXY   8. Hypertriglyceridemia  Lipid Profile   9. Low HDL (under 40)  Lipid Profile   10. Menometrorrhagia  CBC WITHOUT DIFFERENTIAL   11. New daily persistent headache  MR-BRAIN-W/O   12. Family history of brain tumor  MR-BRAIN-W/O   13. PCOS (polycystic ovarian syndrome)  metFORMIN ER (GLUCOPHAGE XR) 500 MG TABLET SR 24 HR   14. Situational anxiety  escitalopram (LEXAPRO) 10 MG Tab     ALPRAZolam (XANAX) 0.25 MG Tab   15. Tension headache  ibuprofen (MOTRIN) 800 MG Tab   16. Muscle spasm of back  tizanidine (ZANAFLEX) 4 MG Tab         Followup: Return in about 6 months (around 5/9/2021), or if symptoms worsen or fail to improve.

## 2020-11-18 LAB
25(OH)D3+25(OH)D2 SERPL-MCNC: 19.2 NG/ML (ref 30–100)
ALBUMIN SERPL-MCNC: 4.5 G/DL (ref 3.8–4.8)
ALBUMIN/GLOB SERPL: 1.7 {RATIO} (ref 1.2–2.2)
ALP SERPL-CCNC: 61 IU/L (ref 39–117)
ALT SERPL-CCNC: 16 IU/L (ref 0–32)
AST SERPL-CCNC: 13 IU/L (ref 0–40)
BASOPHILS # BLD AUTO: 0 X10E3/UL (ref 0–0.2)
BASOPHILS NFR BLD AUTO: 1 %
BILIRUB SERPL-MCNC: <0.2 MG/DL (ref 0–1.2)
BUN SERPL-MCNC: 13 MG/DL (ref 6–20)
BUN/CREAT SERPL: 19 (ref 9–23)
CALCIUM SERPL-MCNC: 9.6 MG/DL (ref 8.7–10.2)
CHLORIDE SERPL-SCNC: 105 MMOL/L (ref 96–106)
CHOLEST SERPL-MCNC: 216 MG/DL (ref 100–199)
CO2 SERPL-SCNC: 19 MMOL/L (ref 20–29)
CREAT SERPL-MCNC: 0.7 MG/DL (ref 0.57–1)
EOSINOPHIL # BLD AUTO: 0.2 X10E3/UL (ref 0–0.4)
EOSINOPHIL NFR BLD AUTO: 3 %
ERYTHROCYTE [DISTWIDTH] IN BLOOD BY AUTOMATED COUNT: 13.1 % (ref 11.7–15.4)
GLOBULIN SER CALC-MCNC: 2.6 G/DL (ref 1.5–4.5)
GLUCOSE SERPL-MCNC: 91 MG/DL (ref 65–99)
HBA1C MFR BLD: 6.2 % (ref 4.8–5.6)
HCT VFR BLD AUTO: 44.1 % (ref 34–46.6)
HDLC SERPL-MCNC: 36 MG/DL
HGB BLD-MCNC: 15.1 G/DL (ref 11.1–15.9)
IMM GRANULOCYTES # BLD AUTO: 0 X10E3/UL (ref 0–0.1)
IMM GRANULOCYTES NFR BLD AUTO: 0 %
IMMATURE CELLS  115398: ABNORMAL
LABORATORY COMMENT REPORT: ABNORMAL
LDLC SERPL CALC-MCNC: 145 MG/DL (ref 0–99)
LYMPHOCYTES # BLD AUTO: 2.9 X10E3/UL (ref 0.7–3.1)
LYMPHOCYTES NFR BLD AUTO: 34 %
MCH RBC QN AUTO: 31.2 PG (ref 26.6–33)
MCHC RBC AUTO-ENTMCNC: 34.2 G/DL (ref 31.5–35.7)
MCV RBC AUTO: 91 FL (ref 79–97)
MONOCYTES # BLD AUTO: 0.5 X10E3/UL (ref 0.1–0.9)
MONOCYTES NFR BLD AUTO: 6 %
MORPHOLOGY BLD-IMP: ABNORMAL
NEUTROPHILS # BLD AUTO: 4.8 X10E3/UL (ref 1.4–7)
NEUTROPHILS NFR BLD AUTO: 56 %
NRBC BLD AUTO-RTO: ABNORMAL %
PLATELET # BLD AUTO: 466 X10E3/UL (ref 150–450)
POTASSIUM SERPL-SCNC: 4.6 MMOL/L (ref 3.5–5.2)
PROT SERPL-MCNC: 7.1 G/DL (ref 6–8.5)
RBC # BLD AUTO: 4.84 X10E6/UL (ref 3.77–5.28)
SODIUM SERPL-SCNC: 137 MMOL/L (ref 134–144)
TRIGL SERPL-MCNC: 193 MG/DL (ref 0–149)
TSH SERPL DL<=0.005 MIU/L-ACNC: 2.15 UIU/ML (ref 0.45–4.5)
VLDLC SERPL CALC-MCNC: 35 MG/DL (ref 5–40)
WBC # BLD AUTO: 8.5 X10E3/UL (ref 3.4–10.8)

## 2020-12-19 ENCOUNTER — HOSPITAL ENCOUNTER (OUTPATIENT)
Facility: MEDICAL CENTER | Age: 30
End: 2020-12-19
Attending: NURSE PRACTITIONER
Payer: COMMERCIAL

## 2020-12-19 ENCOUNTER — OFFICE VISIT (OUTPATIENT)
Dept: URGENT CARE | Facility: PHYSICIAN GROUP | Age: 30
End: 2020-12-19
Payer: COMMERCIAL

## 2020-12-19 VITALS
TEMPERATURE: 97.8 F | HEART RATE: 85 BPM | RESPIRATION RATE: 18 BRPM | WEIGHT: 225 LBS | OXYGEN SATURATION: 96 % | BODY MASS INDEX: 38.41 KG/M2 | DIASTOLIC BLOOD PRESSURE: 80 MMHG | SYSTOLIC BLOOD PRESSURE: 124 MMHG | HEIGHT: 64 IN

## 2020-12-19 DIAGNOSIS — J02.9 PHARYNGITIS, UNSPECIFIED ETIOLOGY: ICD-10-CM

## 2020-12-19 LAB
INT CON NEG: NEGATIVE
INT CON POS: POSITIVE
S PYO AG THROAT QL: NORMAL

## 2020-12-19 PROCEDURE — 99214 OFFICE O/P EST MOD 30 MIN: CPT | Performed by: NURSE PRACTITIONER

## 2020-12-19 PROCEDURE — 87070 CULTURE OTHR SPECIMN AEROBIC: CPT

## 2020-12-19 PROCEDURE — 87880 STREP A ASSAY W/OPTIC: CPT | Performed by: NURSE PRACTITIONER

## 2020-12-19 PROCEDURE — U0003 INFECTIOUS AGENT DETECTION BY NUCLEIC ACID (DNA OR RNA); SEVERE ACUTE RESPIRATORY SYNDROME CORONAVIRUS 2 (SARS-COV-2) (CORONAVIRUS DISEASE [COVID-19]), AMPLIFIED PROBE TECHNIQUE, MAKING USE OF HIGH THROUGHPUT TECHNOLOGIES AS DESCRIBED BY CMS-2020-01-R: HCPCS

## 2020-12-19 ASSESSMENT — ENCOUNTER SYMPTOMS
SWOLLEN GLANDS: 1
SHORTNESS OF BREATH: 0
VOMITING: 0
TROUBLE SWALLOWING: 0
DIARRHEA: 0
SORE THROAT: 1
FEVER: 0
HEADACHES: 0
COUGH: 1

## 2020-12-19 ASSESSMENT — FIBROSIS 4 INDEX: FIB4 SCORE: 0.22

## 2020-12-19 NOTE — PROGRESS NOTES
Subjective:     Minnie Edward is a 30 y.o. female who presents for Pharyngitis (x2 days. sore throat, white spots on back of throat, scratchy throat )      Scratchy throat on Tuesday. White spots on tonsils. Mild cough. No nasal congestion. Allergies, ears itch. Hx of needing a Kenalog shot. No changes in taste or smell. No known exp to strep or COVID.     Pharyngitis   This is a new problem. The current episode started in the past 7 days. Neither side of throat is experiencing more pain than the other. There has been no fever. The pain is at a severity of 3/10. The pain is mild. Associated symptoms include coughing and swollen glands. Pertinent negatives include no congestion, diarrhea, ear pain, headaches, shortness of breath, stridor, trouble swallowing or vomiting. She has had no exposure to strep. She has tried nothing for the symptoms.       Past Medical History:   Diagnosis Date   • Elevated glycohemoglobin    • Frequent headaches    • History of chickenpox 2011    developed this at 21   • Impaired glucose tolerance    • PCOS (polycystic ovarian syndrome) 12/15/2009       No past surgical history on file.    Social History     Socioeconomic History   • Marital status:      Spouse name: Not on file   • Number of children: Not on file   • Years of education: Not on file   • Highest education level: Not on file   Occupational History   • Not on file   Social Needs   • Financial resource strain: Not on file   • Food insecurity     Worry: Not on file     Inability: Not on file   • Transportation needs     Medical: Not on file     Non-medical: Not on file   Tobacco Use   • Smoking status: Never Smoker   • Smokeless tobacco: Never Used   Substance and Sexual Activity   • Alcohol use: No     Alcohol/week: 0.0 oz   • Drug use: No   • Sexual activity: Yes     Partners: Male     Comment: nexplanon   Lifestyle   • Physical activity     Days per week: Not on file     Minutes per session: Not on  "file   • Stress: Not on file   Relationships   • Social connections     Talks on phone: Not on file     Gets together: Not on file     Attends Islam service: Not on file     Active member of club or organization: Not on file     Attends meetings of clubs or organizations: Not on file     Relationship status: Not on file   • Intimate partner violence     Fear of current or ex partner: Not on file     Emotionally abused: Not on file     Physically abused: Not on file     Forced sexual activity: Not on file   Other Topics Concern   • Not on file   Social History Narrative    ** Merged History Encounter **             Family History   Problem Relation Age of Onset   • Diabetes Mother    • Thyroid Mother    • Diabetes Father    • Hypertension Father    • Heart Disease Father    • Diabetes Brother    • Stroke Paternal Grandmother    • Other Sister         occipital headaches        No Known Allergies    Review of Systems   Constitutional: Negative for fever.   HENT: Positive for sore throat. Negative for congestion, ear pain and trouble swallowing.    Respiratory: Positive for cough. Negative for sputum production, shortness of breath and stridor.    Gastrointestinal: Negative for diarrhea and vomiting.   Neurological: Negative for headaches.   Endo/Heme/Allergies: Positive for environmental allergies.   All other systems reviewed and are negative.       Objective:   /80   Pulse 85   Temp 36.6 °C (97.8 °F) (Temporal)   Resp 18   Ht 1.626 m (5' 4\")   Wt 102.1 kg (225 lb)   SpO2 96%   BMI 38.62 kg/m²     Physical Exam  Vitals signs reviewed.   Constitutional:       General: She is not in acute distress.     Appearance: She is well-developed. She is not toxic-appearing.   HENT:      Head: Normocephalic and atraumatic.      Right Ear: Tympanic membrane, ear canal and external ear normal.      Left Ear: Tympanic membrane, ear canal and external ear normal.      Nose: Mucosal edema present.      Mouth/Throat:   "      Mouth: Mucous membranes are moist.      Pharynx: Posterior oropharyngeal erythema present.      Tonsils: Tonsillar exudate present. No tonsillar abscesses. 1+ on the right. 1+ on the left.        Comments: Left tonsillar debris, white. Mild oropharyngeal errythema.  Eyes:      Conjunctiva/sclera: Conjunctivae normal.   Neck:      Musculoskeletal: Normal range of motion and neck supple. No neck rigidity or muscular tenderness.   Cardiovascular:      Rate and Rhythm: Normal rate.   Pulmonary:      Effort: Pulmonary effort is normal. No respiratory distress.      Breath sounds: Normal breath sounds.   Musculoskeletal: Normal range of motion.   Lymphadenopathy:      Head:      Right side of head: No tonsillar adenopathy.      Left side of head: No tonsillar adenopathy.   Skin:     General: Skin is warm and dry.      Findings: No rash.   Neurological:      General: No focal deficit present.      Mental Status: She is alert and oriented to person, place, and time.      GCS: GCS eye subscore is 4. GCS verbal subscore is 5. GCS motor subscore is 6.   Psychiatric:         Mood and Affect: Mood normal.         Speech: Speech normal.         Behavior: Behavior normal.         Thought Content: Thought content normal.         Judgment: Judgment normal.         Assessment/Plan:   1. Pharyngitis, unspecified etiology  - POCT Rapid Strep A    Results for orders placed or performed in visit on 12/19/20   POCT Rapid Strep A   Result Value Ref Range    Rapid Strep Screen Neg     Internal Control Positive Positive     Internal Control Negative Negative      -Oral Hydration.  -Warm salt water gargles.  -OTC Throat lozenges or spray (Cepacol).  -Tylenol and Motrin as directed for pain and fever.  -Hand Hygiene: Wash hands frequently with soap and water.    Follow up for persistent throat pain, increased swelling, persistent fevers, difficulty swallowing, shortness of breath, weakness, elevated heart rate, or any other concerns.      -Discussed viral etiology. COVID S&S, and self isolation guidelines. S&S of PNA with follow up.     Symptomatic care.  -Oral hydration and rest.   -Cough control: nonpharmacologic options for cough relief such as throat lozenges, hot tea, honey.  -Over the counter expectorant as directed; Guaifenesin (Mucinex).  -Tylenol or ibuprofen for pain and fever as directed.     Seek emergency medical care immediately for: Trouble breathing, persistent pain or pressure in the chest, confusion, inability to wake or stay awake, bluish lips or face. Follow up for prolonged cough, persistent wheezing, tachycardia (fast heart rate) dizziness, persistent high grade fevers, or any other concerns. Follow up with PCP.     Differential diagnosis, natural history, supportive care, and indications for immediate follow-up discussed.

## 2020-12-19 NOTE — PATIENT INSTRUCTIONS
Pharyngitis    Pharyngitis is redness, pain, and swelling (inflammation) of the throat (pharynx). It is a very common cause of sore throat. Pharyngitis can be caused by a bacteria, but it is usually caused by a virus. Most cases of pharyngitis get better on their own without treatment.  What are the causes?  This condition may be caused by:  · Infection by viruses (viral). Viral pharyngitis spreads from person to person (is contagious) through coughing, sneezing, and sharing of personal items or utensils such as cups, forks, spoons, and toothbrushes.  · Infection by bacteria (bacterial). Bacterial pharyngitis may be spread by touching the nose or face after coming in contact with the bacteria, or through more intimate contact, such as kissing.  · Allergies. Allergies can cause buildup of mucus in the throat (post-nasal drip), leading to inflammation and irritation. Allergies can also cause blocked nasal passages, forcing breathing through the mouth, which dries and irritates the throat.  What increases the risk?  You are more likely to develop this condition if:  · You are 5-24 years old.  · You are exposed to crowded environments such as , school, or dormitory living.  · You live in a cold climate.  · You have a weakened disease-fighting (immune) system.  What are the signs or symptoms?  Symptoms of this condition vary by the cause (viral, bacterial, or allergies) and can include:  · Sore throat.  · Fatigue.  · Low-grade fever.  · Headache.  · Joint pain and muscle aches.  · Skin rashes.  · Swollen glands in the throat (lymph nodes).  · Plaque-like film on the throat or tonsils. This is often a symptom of bacterial pharyngitis.  · Vomiting.  · Stuffy nose (nasal congestion).  · Cough.  · Red, itchy eyes (conjunctivitis).  · Loss of appetite.  How is this diagnosed?  This condition is often diagnosed based on your medical history and a physical exam. Your health care provider will ask you questions about your  illness and your symptoms. A swab of your throat may be done to check for bacteria (rapid strep test). Other lab tests may also be done, depending on the suspected cause, but these are rare.  How is this treated?  This condition usually gets better in 3-4 days without medicine. Bacterial pharyngitis may be treated with antibiotic medicines.  Follow these instructions at home:  · Take over-the-counter and prescription medicines only as told by your health care provider.  ? If you were prescribed an antibiotic medicine, take it as told by your health care provider. Do not stop taking the antibiotic even if you start to feel better.  ? Do not give children aspirin because of the association with Reye syndrome.  · Drink enough water and fluids to keep your urine clear or pale yellow.  · Get a lot of rest.  · Gargle with a salt-water mixture 3-4 times a day or as needed. To make a salt-water mixture, completely dissolve ½-1 tsp of salt in 1 cup of warm water.  · If your health care provider approves, you may use throat lozenges or sprays to soothe your throat.  Contact a health care provider if:  · You have large, tender lumps in your neck.  · You have a rash.  · You cough up green, yellow-brown, or bloody spit.  Get help right away if:  · Your neck becomes stiff.  · You drool or are unable to swallow liquids.  · You cannot drink or take medicines without vomiting.  · You have severe pain that does not go away, even after you take medicine.  · You have trouble breathing, and it is not caused by a stuffy nose.  · You have new pain and swelling in your joints such as the knees, ankles, wrists, or elbows.  Summary  · Pharyngitis is redness, pain, and swelling (inflammation) of the throat (pharynx).  · While pharyngitis can be caused by a bacteria, the most common causes are viral.  · Most cases of pharyngitis get better on their own without treatment.  · Bacterial pharyngitis is treated with antibiotic medicines.  This  information is not intended to replace advice given to you by your health care provider. Make sure you discuss any questions you have with your health care provider.  Document Released: 12/18/2006 Document Revised: 11/30/2018 Document Reviewed: 01/23/2018  [employer] Patient Education © 2020 [employer]    INSTRUCTIONS FOR COVID-19 OR ANY OTHER INFECTIOUS RESPIRATORY ILLNESSES    The Centers for Disease Control and Prevention (CDC) states that early indications for COVID-19 include cough, shortness of breath, difficulty breathing, or at least two of the following symptoms: chills, shaking with chills, muscle pain, headache, sore throat, and loss of taste or smell. Symptoms can range from mild to severe and may appear up to two weeks after exposure to the virus.    The practice of self-isolation and quarantine helps protect the public and your family by  preventing exposure to people who have or may have a contagious disease. Please follow the prevention steps below as based on CDC guidelines:    WHEN TO STOP ISOLATION: Persons with COVID-19 or any other infectious respiratory illness who have symptoms and were advised to care for themselves at home may discontinue home isolation under the following conditions:  · At least 24 hours have passed since recovery defined as resolution of fever without the use of fever-reducing medications; AND,  · Improvement in respiratory symptoms (e.g., cough, shortness of breath); AND,  · At least 10 days have passed since symptoms first appeared and have had no subsequent illness.    MONITOR YOUR SYMPTOMS: If your illness is worsening, seek prompt medical attention. If you have a medical emergency and need to call 911, notify the dispatch personnel that you have, or are being evaluated for confirmed or suspected COVID-19 or another infectious respiratory illness. Wear a facemask if possible.    ACTIVITY RESTRICTION: restrict activities outside your home, except for getting medical  care. Do not go to work, school, or public areas. Avoid using public transportation, ride-sharing, or taxis.    SCHEDULED MEDICAL APPOINTMENTS: Notify your provider that you have, or are being evaluated for, confirmed or suspected COVID-19 or another infectious respiratory. This will help the healthcare provider’s office safely take care of you and keep other people from getting exposed or infected.    FACEMASKS, when to wear: Anytime you are away from your home or around other people or pets. If you are unable to wear one, maintain a minimum of 6 feet distancing from others.    LIVING ENVIRONMENT: Stay in a separate room from other people and pets. If possible, use a separate bathroom, have someone else care for your pets and avoid sharing household items. Any items used should be washed thoroughly with soap and water. Clean all “high-touch” surfaces every day. Use a household cleaning spray or wipe, according to the label instructions. High touch surfaces include (but are not limited to) counters, tabletops, doorknobs, bathroom fixtures, toilets, phones, keyboards, tablets, and bedside tables.     HAND WASHING: Frequently wash hands with soap and water for at least 20 seconds,  especially after blowing your nose, coughing, or sneezing; going to the bathroom; before and after interacting with pets; and before and after eating or preparing food. If hands are visibly dirty use soap and water. If soap and water are not available, use an alcohol-based hand  with at least 60% alcohol. Avoid touching your eyes, nose, and mouth with unwashed hands. Cover your coughs and sneezes with a tissue. Throw used tissues in a lined trash can. Immediately wash your hands.    ACTIVE/FACILITATED SELF-MONITORING: Follow instructions provided by your local health department or health professionals, as appropriate. When working with your local health department check their available hours.    [facility]   Phone Number   [place] [phone]  [phone]   [place], [place] [phone]   [place] [address]   [place] [phone]     IF YOU HAVE CONFIRMED POSITIVE COVID-19:    Those who have completely recovered from COVID-19 may have immune-boosting antibodies in their plasma--called “convalescent plasma”--that could be used to treat critically ill COVID19 patients.    [facility] is excited to begin working with [employer] on collecting convalescent plasma from  people who have recovered from COVID-19 as part of a program to treat patients infected with the virus. This FDA-approved “emergency investigational new drug” is a special blood product containing antibodies that may give patients an extra boost to fight the virus.    To be eligible to donate convalescent plasma, you must have a prior COVID-19 diagnosis documented by a laboratory test (or a positive test result for SARS-CoV-2 antibodies) and meet additional eligibility requirements.    If you are interested in donating convalescent plasma or have any additional questions, please contact the [facility] Convalescent Plasma  at [phone] or via e-mail at [email or web address].      COVID-19  COVID-19 is a respiratory infection that is caused by a virus called severe acute respiratory syndrome coronavirus 2 (SARS-CoV-2). The disease is also known as coronavirus disease or novel coronavirus. In some people, the virus may not cause any symptoms. In others, it may cause a serious infection. The infection can get worse quickly and can lead to complications, such as:  · Pneumonia, or infection of the lungs.  · Acute respiratory distress syndrome or ARDS. This is fluid build-up in the lungs.  · Acute respiratory failure. This is a condition in which there is not enough oxygen passing from the lungs to the body.  · Sepsis or septic shock. This is a serious bodily reaction to an infection.  · Blood clotting problems.  · Secondary infections due to bacteria or  fungus.  The virus that causes COVID-19 is contagious. This means that it can spread from person to person through droplets from coughs and sneezes (respiratory secretions).  What are the causes?  This illness is caused by a virus. You may catch the virus by:  · Breathing in droplets from an infected person's cough or sneeze.  · Touching something, like a table or a doorknob, that was exposed to the virus (contaminated) and then touching your mouth, nose, or eyes.  What increases the risk?  Risk for infection  You are more likely to be infected with this virus if you:  · Live in or travel to an area with a COVID-19 outbreak.  · Come in contact with a sick person who recently traveled to an area with a COVID-19 outbreak.  · Provide care for or live with a person who is infected with COVID-19.  Risk for serious illness  You are more likely to become seriously ill from the virus if you:  · Are 65 years of age or older.  · Have a long-term disease that lowers your body's ability to fight infection (immunocompromised).  · Live in a nursing home or long-term care facility.  · Have a long-term (chronic) disease such as:  ? Chronic lung disease, including chronic obstructive pulmonary disease or asthma  ? Heart disease.  ? Diabetes.  ? Chronic kidney disease.  ? Liver disease.  · Are obese.  What are the signs or symptoms?  Symptoms of this condition can range from mild to severe. Symptoms may appear any time from 2 to 14 days after being exposed to the virus. They include:  · A fever.  · A cough.  · Difficulty breathing.  · Chills.  · Muscle pains.  · A sore throat.  · Loss of taste or smell.  Some people may also have stomach problems, such as nausea, vomiting, or diarrhea.  Other people may not have any symptoms of COVID-19.  How is this diagnosed?  This condition may be diagnosed based on:  · Your signs and symptoms, especially if:  ? You live in an area with a COVID-19 outbreak.  ? You recently traveled to or from an  area where the virus is common.  ? You provide care for or live with a person who was diagnosed with COVID-19.  · A physical exam.  · Lab tests, which may include:  ? A nasal swab to take a sample of fluid from your nose.  ? A throat swab to take a sample of fluid from your throat.  ? A sample of mucus from your lungs (sputum).  ? Blood tests.  · Imaging tests, which may include, X-rays, CT scan, or ultrasound.  How is this treated?  At present, there is no medicine to treat COVID-19. Medicines that treat other diseases are being used on a trial basis to see if they are effective against COVID-19.  Your health care provider will talk with you about ways to treat your symptoms. For most people, the infection is mild and can be managed at home with rest, fluids, and over-the-counter medicines.  Treatment for a serious infection usually takes places in a hospital intensive care unit (ICU). It may include one or more of the following treatments. These treatments are given until your symptoms improve.  · Receiving fluids and medicines through an IV.  · Supplemental oxygen. Extra oxygen is given through a tube in the nose, a face mask, or a engel.  · Positioning you to lie on your stomach (prone position). This makes it easier for oxygen to get into the lungs.  · Continuous positive airway pressure (CPAP) or bi-level positive airway pressure (BPAP) machine. This treatment uses mild air pressure to keep the airways open. A tube that is connected to a motor delivers oxygen to the body.  · Ventilator. This treatment moves air into and out of the lungs by using a tube that is placed in your windpipe.  · Tracheostomy. This is a procedure to create a hole in the neck so that a breathing tube can be inserted.  · Extracorporeal membrane oxygenation (ECMO). This procedure gives the lungs a chance to recover by taking over the functions of the heart and lungs. It supplies oxygen to the body and removes carbon dioxide.  Follow these  instructions at home:  Lifestyle  · If you are sick, stay home except to get medical care. Your health care provider will tell you how long to stay home. Call your health care provider before you go for medical care.  · Rest at home as told by your health care provider.  · Do not use any products that contain nicotine or tobacco, such as cigarettes, e-cigarettes, and chewing tobacco. If you need help quitting, ask your health care provider.  · Return to your normal activities as told by your health care provider. Ask your health care provider what activities are safe for you.  General instructions  · Take over-the-counter and prescription medicines only as told by your health care provider.  · Drink enough fluid to keep your urine pale yellow.  · Keep all follow-up visits as told by your health care provider. This is important.  How is this prevented?    There is no vaccine to help prevent COVID-19 infection. However, there are steps you can take to protect yourself and others from this virus.  To protect yourself:   · Do not travel to areas where COVID-19 is a risk. The areas where COVID-19 is reported change often. To identify high-risk areas and travel restrictions, check the CDC travel website: wwwnc.cdc.gov/travel/notices  · If you live in, or must travel to, an area where COVID-19 is a risk, take precautions to avoid infection.  ? Stay away from people who are sick.  ? Wash your hands often with soap and water for 20 seconds. If soap and water are not available, use an alcohol-based hand .  ? Avoid touching your mouth, face, eyes, or nose.  ? Avoid going out in public, follow guidance from your state and local health authorities.  ? If you must go out in public, wear a cloth face covering or face mask.  ? Disinfect objects and surfaces that are frequently touched every day. This may include:  § Counters and tables.  § Doorknobs and light switches.  § Sinks and faucets.  § Electronics, such as phones,  remote controls, keyboards, computers, and tablets.  To protect others:  If you have symptoms of COVID-19, take steps to prevent the virus from spreading to others.  · If you think you have a COVID-19 infection, contact your health care provider right away. Tell your health care team that you think you may have a COVID-19 infection.  · Stay home. Leave your house only to seek medical care. Do not use public transport.  · Do not travel while you are sick.  · Wash your hands often with soap and water for 20 seconds. If soap and water are not available, use alcohol-based hand .  · Stay away from other members of your household. Let healthy household members care for children and pets, if possible. If you have to care for children or pets, wash your hands often and wear a mask. If possible, stay in your own room, separate from others. Use a different bathroom.  · Make sure that all people in your household wash their hands well and often.  · Cough or sneeze into a tissue or your sleeve or elbow. Do not cough or sneeze into your hand or into the air.  · Wear a cloth face covering or face mask.  Where to find more information  · Centers for Disease Control and Prevention: www.cdc.gov/coronavirus/2019-ncov/index.html  · World Health Organization: www.who.int/health-topics/coronavirus  Contact a health care provider if:  · You live in or have traveled to an area where COVID-19 is a risk and you have symptoms of the infection.  · You have had contact with someone who has COVID-19 and you have symptoms of the infection.  Get help right away if:  · You have trouble breathing.  · You have pain or pressure in your chest.  · You have confusion.  · You have bluish lips and fingernails.  · You have difficulty waking from sleep.  · You have symptoms that get worse.  These symptoms may represent a serious problem that is an emergency. Do not wait to see if the symptoms will go away. Get medical help right away. Call your  local emergency services (911 in the [place] Do not drive yourself to the hospital. Let the emergency medical personnel know if you think you have COVID-19.  Summary  · COVID-19 is a respiratory infection that is caused by a virus. It is also known as coronavirus disease or novel coronavirus. It can cause serious infections, such as pneumonia, acute respiratory distress syndrome, acute respiratory failure, or sepsis.  · The virus that causes COVID-19 is contagious. This means that it can spread from person to person through droplets from coughs and sneezes.  · You are more likely to develop a serious illness if you are 65 years of age or older, have a weak immunity, live in a nursing home, or have chronic disease.  · There is no medicine to treat COVID-19. Your health care provider will talk with you about ways to treat your symptoms.  · Take steps to protect yourself and others from infection. Wash your hands often and disinfect objects and surfaces that are frequently touched every day. Stay away from people who are sick and wear a mask if you are sick.  This information is not intended to replace advice given to you by your health care provider. Make sure you discuss any questions you have with your health care provider.  Document Released: 01/23/2020 Document Revised: 05/14/2020 Document Reviewed: 01/23/2020  [employer] Patient Education © 2020 [employer]

## 2020-12-20 LAB
COVID ORDER STATUS COVID19: NORMAL
SARS-COV-2 RNA RESP QL NAA+PROBE: NOTDETECTED
SPECIMEN SOURCE: NORMAL

## 2020-12-22 LAB
BACTERIA SPEC RESP CULT: NORMAL
SIGNIFICANT IND 70042: NORMAL
SITE SITE: NORMAL
SOURCE SOURCE: NORMAL

## 2020-12-24 ASSESSMENT — ENCOUNTER SYMPTOMS
SPUTUM PRODUCTION: 0
STRIDOR: 0

## 2021-03-16 ENCOUNTER — APPOINTMENT (OUTPATIENT)
Dept: MEDICAL GROUP | Facility: MEDICAL CENTER | Age: 31
End: 2021-03-16
Payer: COMMERCIAL

## 2021-04-05 ENCOUNTER — OFFICE VISIT (OUTPATIENT)
Dept: MEDICAL GROUP | Facility: MEDICAL CENTER | Age: 31
End: 2021-04-05
Payer: COMMERCIAL

## 2021-04-05 VITALS
HEART RATE: 89 BPM | TEMPERATURE: 98.6 F | BODY MASS INDEX: 40.12 KG/M2 | SYSTOLIC BLOOD PRESSURE: 114 MMHG | RESPIRATION RATE: 16 BRPM | WEIGHT: 235 LBS | DIASTOLIC BLOOD PRESSURE: 60 MMHG | HEIGHT: 64 IN | OXYGEN SATURATION: 98 %

## 2021-04-05 DIAGNOSIS — Z63.4 BEREAVEMENT: ICD-10-CM

## 2021-04-05 DIAGNOSIS — R73.09 ELEVATED GLYCOHEMOGLOBIN: ICD-10-CM

## 2021-04-05 DIAGNOSIS — K21.9 GASTROESOPHAGEAL REFLUX DISEASE, UNSPECIFIED WHETHER ESOPHAGITIS PRESENT: ICD-10-CM

## 2021-04-05 DIAGNOSIS — G44.039 EPISODIC PAROXYSMAL HEMICRANIA, NOT INTRACTABLE: ICD-10-CM

## 2021-04-05 DIAGNOSIS — E03.0 CONGENITAL HYPOTHYROIDISM WITH DIFFUSE GOITER: ICD-10-CM

## 2021-04-05 DIAGNOSIS — E01.0 THYROMEGALY: ICD-10-CM

## 2021-04-05 DIAGNOSIS — R73.03 PREDIABETES: ICD-10-CM

## 2021-04-05 DIAGNOSIS — E78.1 HYPERTRIGLYCERIDEMIA: ICD-10-CM

## 2021-04-05 DIAGNOSIS — G44.52 NEW DAILY PERSISTENT HEADACHE: ICD-10-CM

## 2021-04-05 DIAGNOSIS — F41.8 SITUATIONAL ANXIETY: ICD-10-CM

## 2021-04-05 PROCEDURE — 99214 OFFICE O/P EST MOD 30 MIN: CPT | Performed by: FAMILY MEDICINE

## 2021-04-05 RX ORDER — ALPRAZOLAM 0.25 MG/1
0.25 TABLET ORAL 3 TIMES DAILY PRN
Qty: 21 TABLET | Refills: 0 | Status: SHIPPED | OUTPATIENT
Start: 2021-04-05 | End: 2022-03-03 | Stop reason: SDUPTHER

## 2021-04-05 RX ORDER — ESCITALOPRAM OXALATE 20 MG/1
20 TABLET ORAL DAILY
Qty: 90 TABLET | Refills: 3 | Status: SHIPPED | OUTPATIENT
Start: 2021-04-05 | End: 2022-04-27 | Stop reason: SDUPTHER

## 2021-04-05 RX ORDER — LEVOTHYROXINE SODIUM 0.05 MG/1
50 TABLET ORAL
Qty: 90 TABLET | Refills: 2 | Status: SHIPPED | OUTPATIENT
Start: 2021-04-05 | End: 2022-03-03 | Stop reason: SDUPTHER

## 2021-04-05 SDOH — SOCIAL STABILITY - SOCIAL INSECURITY: DISSAPEARANCE AND DEATH OF FAMILY MEMBER: Z63.4

## 2021-04-05 ASSESSMENT — PATIENT HEALTH QUESTIONNAIRE - PHQ9
SUM OF ALL RESPONSES TO PHQ QUESTIONS 1-9: 19
5. POOR APPETITE OR OVEREATING: 3 - NEARLY EVERY DAY
CLINICAL INTERPRETATION OF PHQ2 SCORE: 6

## 2021-04-05 ASSESSMENT — FIBROSIS 4 INDEX: FIB4 SCORE: 0.22

## 2021-04-05 NOTE — PROGRESS NOTES
Chief Complaint   Patient presents with   • Depressed     grief   • Hypothyroidism   • Prediabetes   • Hyperlipidemia   • Gastrophageal Reflux       Subjective:     HPI:   Minnie Edward presents today with the followin. Situational anxiety/Bereavement  Unfortunately terribly her brother  relatively suddenly of renal failure.  This was completely unexpected.  She is devastated by the loss.  She is also worried about her father who is very sad of course about this loss.  There is some family turmoil which is added to the problem.  She feels grateful that she has her children that she needs to be strong for but also felt like there was no one she could talk to.  She has started counseling which is helping.  She feels her current Lexapro while quite helpful is not quite helpful enough.  We have discussed together increasing the dose to 20 mg.  She is using alprazolam for anxiety as needed.  Her last fill was a couple months ago.  PDMP review shows no inconsistencies.  This is renewed.    2. Thyromegaly/Congenital hypothyroidism with diffuse goiter  Patient does have visually stable diffuse thyromegaly.  She has a history of hypothyroidism.  She is very sad and tired which she attributes primarily to her bereavement but she would like to have her thyroid checked again which is reasonable.  Orders are discussed and placed.    3. Prediabetes/Elevated glycohemoglobin  She is trying to eat as well as possible but does find herself stress eating.  Her weight has increased significantly.  She is worried that she may now be diabetic.  Lab orders are discussed and placed.    4. Hypertriglyceridemia  Patient has history of moderate dyslipidemia.  Denies chest pain, chest pressure, palpitations or exertional shortness of breath.  Follow-up lab order discussed and placed.    5. Gastroesophageal reflux disease, unspecified whether esophagitis present  The patient feels the current medication regimen of  omeprazole is controlling the gastroesophageal reflux symptoms well. Denies dysphagia, reflux symptoms, acidity, abdominal pain or visible blood or mucus in the stool. Denies vomiting or hematemesis. Denies burping or abdominal bloating. Patient avoids nonsteroidal anti-inflammatory drugs. Avoids heavy meals or eating within 2 hours of bedtime.  CBC order discussed and placed.    6. Episodic paroxysmal hemicrania, not intractable/New daily persistent headache  Patient does have headaches which seem to be varying between daily persistent and episodic migraine.  Denies any permanent visual changes but she is getting some auras and flashes.  MRI was discussed and order was placed last fall but patient never got a call and would like to complete that at this point.  Order is placed.  - MR-BRAIN-W/O; Future        Patient Active Problem List    Diagnosis Date Noted   • Situational anxiety 05/03/2019   • Menometrorrhagia 05/03/2019   • Prediabetes 07/31/2018   • Elevated glycohemoglobin 09/21/2017   • Vitamin D deficiency 09/21/2017   • Hypertriglyceridemia 09/21/2017   • IGT (impaired glucose tolerance) 05/31/2017   • Thyromegaly 12/20/2016   • Tension headache 12/20/2016   • Obesity, Class II, BMI 35-39.9, with comorbidity 09/29/2016   • Left-sided low back pain with left-sided sciatica 10/02/2015   • Seasonal allergies 09/26/2014   • PCOS (Polycystic Ovarian Syndrome) 12/15/2009       Current medicines (including changes today)  Current Outpatient Medications   Medication Sig Dispense Refill   • escitalopram (LEXAPRO) 20 MG tablet Take 1 tablet by mouth every day. 90 tablet 3   • levothyroxine (SYNTHROID) 50 MCG Tab Take 1 tablet by mouth every morning on an empty stomach. 90 tablet 2   • ALPRAZolam (XANAX) 0.25 MG Tab Take 1 tablet by mouth 3 times a day as needed for Anxiety for up to 7 days. 21 tablet 0   • omeprazole (PRILOSEC) 40 MG delayed-release capsule Take 1 Cap by mouth every day. 90 Cap 3   • metFORMIN ER  "(GLUCOPHAGE XR) 500 MG TABLET SR 24 HR Take 1 Tab by mouth ONE-HALF HOUR AFTER DINNER. 90 Tab 3   • ibuprofen (MOTRIN) 800 MG Tab Take 1 Tab by mouth every 12 hours as needed for Headache or Inflammation. Take with food 180 Tab 3   • tizanidine (ZANAFLEX) 4 MG Tab Take 1 Tab by mouth every bedtime. 30 Tab 6     No current facility-administered medications for this visit.       No Known Allergies    ROS: As per HPI       Objective:     /60   Pulse 89   Temp 37 °C (98.6 °F)   Resp 16   Ht 1.626 m (5' 4\")   Wt 107 kg (235 lb)   SpO2 98%  Body mass index is 40.34 kg/m².    Physical Exam:  Constitutional: Well-developed and well-nourished. Not diaphoretic. No distress. Lucid and fluent.  Patient, physician and staff all wearing masks.  Skin: Skin is warm and dry. No rash noted.  Head: Atraumatic without lesions.  Eyes: Conjunctivae and extraocular motions are normal. Pupils are equal, round, and reactive to light. No scleral icterus.   Ears:  External ears unremarkable  Neck: Supple, trachea midline. No thyromegaly present. No cervical or supraclavicular lymphadenopathy. No JVD or carotid bruits appreciated  Cardiovascular: Regular rate and rhythm.  Normal S1, S2 without murmur appreciated.  Chest: Effort normal. Clear to auscultation throughout. No adventitious sounds.   Extremities: No cyanosis, clubbing, erythema, nor edema.   Neurological: Alert and oriented x 3. No tremor noted.  Psychiatric:  Behavior, mood, and affect are appropriate.       Assessment and Plan:     31 y.o. female with the following issues:    1. Situational anxiety  escitalopram (LEXAPRO) 20 MG tablet    ALPRAZolam (XANAX) 0.25 MG Tab   2. Bereavement  escitalopram (LEXAPRO) 20 MG tablet   3. Thyromegaly  levothyroxine (SYNTHROID) 50 MCG Tab    TSH   4. Congenital hypothyroidism with diffuse goiter  TSH   5. Prediabetes  HEMOGLOBIN A1C    Comp Metabolic Panel   6. Elevated glycohemoglobin  HEMOGLOBIN A1C    Comp Metabolic Panel   7. " Hypertriglyceridemia  Comp Metabolic Panel    Lipid Profile   8. Gastroesophageal reflux disease, unspecified whether esophagitis present  CBC WITHOUT DIFFERENTIAL   9. Episodic paroxysmal hemicrania, not intractable  MR-BRAIN-W/O   10. New daily persistent headache  MR-BRAIN-W/O         Followup: Return in about 4 months (around 8/5/2021), or if symptoms worsen or fail to improve.

## 2021-04-29 LAB
ALBUMIN SERPL-MCNC: 4.1 G/DL (ref 3.8–4.8)
ALBUMIN/GLOB SERPL: 1.6 {RATIO} (ref 1.2–2.2)
ALP SERPL-CCNC: 60 IU/L (ref 39–117)
ALT SERPL-CCNC: 17 IU/L (ref 0–32)
AST SERPL-CCNC: 16 IU/L (ref 0–40)
BILIRUB SERPL-MCNC: 0.2 MG/DL (ref 0–1.2)
BUN SERPL-MCNC: 11 MG/DL (ref 6–20)
BUN/CREAT SERPL: 16 (ref 9–23)
CALCIUM SERPL-MCNC: 9.3 MG/DL (ref 8.7–10.2)
CHLORIDE SERPL-SCNC: 103 MMOL/L (ref 96–106)
CHOLEST SERPL-MCNC: 209 MG/DL (ref 100–199)
CO2 SERPL-SCNC: 21 MMOL/L (ref 20–29)
CREAT SERPL-MCNC: 0.69 MG/DL (ref 0.57–1)
ERYTHROCYTE [DISTWIDTH] IN BLOOD BY AUTOMATED COUNT: 13.1 % (ref 11.7–15.4)
GLOBULIN SER CALC-MCNC: 2.6 G/DL (ref 1.5–4.5)
GLUCOSE SERPL-MCNC: 96 MG/DL (ref 65–99)
HBA1C MFR BLD: 6.4 % (ref 4.8–5.6)
HCT VFR BLD AUTO: 42.6 % (ref 34–46.6)
HDLC SERPL-MCNC: 36 MG/DL
HGB BLD-MCNC: 14.3 G/DL (ref 11.1–15.9)
LABORATORY COMMENT REPORT: ABNORMAL
LDLC SERPL CALC-MCNC: 146 MG/DL (ref 0–99)
MCH RBC QN AUTO: 31.2 PG (ref 26.6–33)
MCHC RBC AUTO-ENTMCNC: 33.6 G/DL (ref 31.5–35.7)
MCV RBC AUTO: 93 FL (ref 79–97)
NRBC BLD AUTO-RTO: ABNORMAL %
PLATELET # BLD AUTO: 463 X10E3/UL (ref 150–450)
POTASSIUM SERPL-SCNC: 4.5 MMOL/L (ref 3.5–5.2)
PROT SERPL-MCNC: 6.7 G/DL (ref 6–8.5)
RBC # BLD AUTO: 4.58 X10E6/UL (ref 3.77–5.28)
SODIUM SERPL-SCNC: 138 MMOL/L (ref 134–144)
TRIGL SERPL-MCNC: 147 MG/DL (ref 0–149)
TSH SERPL DL<=0.005 MIU/L-ACNC: 2.24 UIU/ML (ref 0.45–4.5)
VLDLC SERPL CALC-MCNC: 27 MG/DL (ref 5–40)
WBC # BLD AUTO: 9.4 X10E3/UL (ref 3.4–10.8)

## 2021-08-16 ENCOUNTER — OFFICE VISIT (OUTPATIENT)
Dept: URGENT CARE | Facility: CLINIC | Age: 31
End: 2021-08-16
Payer: COMMERCIAL

## 2021-08-16 ENCOUNTER — HOSPITAL ENCOUNTER (OUTPATIENT)
Facility: MEDICAL CENTER | Age: 31
End: 2021-08-16
Attending: PHYSICIAN ASSISTANT
Payer: COMMERCIAL

## 2021-08-16 VITALS
DIASTOLIC BLOOD PRESSURE: 70 MMHG | HEART RATE: 88 BPM | RESPIRATION RATE: 14 BRPM | SYSTOLIC BLOOD PRESSURE: 120 MMHG | BODY MASS INDEX: 39.95 KG/M2 | OXYGEN SATURATION: 97 % | TEMPERATURE: 98.6 F | WEIGHT: 234 LBS | HEIGHT: 64 IN

## 2021-08-16 DIAGNOSIS — R51.9 NONINTRACTABLE HEADACHE, UNSPECIFIED CHRONICITY PATTERN, UNSPECIFIED HEADACHE TYPE: ICD-10-CM

## 2021-08-16 DIAGNOSIS — Z11.52 ENCOUNTER FOR SCREENING FOR COVID-19: ICD-10-CM

## 2021-08-16 DIAGNOSIS — J01.40 ACUTE PANSINUSITIS, RECURRENCE NOT SPECIFIED: ICD-10-CM

## 2021-08-16 DIAGNOSIS — J30.9 ALLERGIC RHINITIS, UNSPECIFIED SEASONALITY, UNSPECIFIED TRIGGER: ICD-10-CM

## 2021-08-16 PROCEDURE — U0003 INFECTIOUS AGENT DETECTION BY NUCLEIC ACID (DNA OR RNA); SEVERE ACUTE RESPIRATORY SYNDROME CORONAVIRUS 2 (SARS-COV-2) (CORONAVIRUS DISEASE [COVID-19]), AMPLIFIED PROBE TECHNIQUE, MAKING USE OF HIGH THROUGHPUT TECHNOLOGIES AS DESCRIBED BY CMS-2020-01-R: HCPCS

## 2021-08-16 PROCEDURE — 99214 OFFICE O/P EST MOD 30 MIN: CPT | Performed by: PHYSICIAN ASSISTANT

## 2021-08-16 PROCEDURE — U0005 INFEC AGEN DETEC AMPLI PROBE: HCPCS

## 2021-08-16 RX ORDER — AZELASTINE 1 MG/ML
1 SPRAY, METERED NASAL 2 TIMES DAILY
Qty: 30 ML | Refills: 1 | Status: SHIPPED | OUTPATIENT
Start: 2021-08-16 | End: 2021-10-07

## 2021-08-16 RX ORDER — AMOXICILLIN AND CLAVULANATE POTASSIUM 875; 125 MG/1; MG/1
1 TABLET, FILM COATED ORAL 2 TIMES DAILY
Qty: 20 TABLET | Refills: 0 | Status: SHIPPED | OUTPATIENT
Start: 2021-08-16 | End: 2021-10-07

## 2021-08-16 ASSESSMENT — ENCOUNTER SYMPTOMS
SHORTNESS OF BREATH: 0
SPUTUM PRODUCTION: 0
CHILLS: 0
VOMITING: 0
FEVER: 0
ABDOMINAL PAIN: 0
HEADACHES: 1
COUGH: 1
SINUS PAIN: 1
NAUSEA: 0
DIARRHEA: 0
WHEEZING: 0
SORE THROAT: 1

## 2021-08-16 ASSESSMENT — FIBROSIS 4 INDEX: FIB4 SCORE: 0.27

## 2021-08-16 NOTE — PROGRESS NOTES
Subjective:   Minnie Edward  is a 31 y.o. female who presents for Headache (Sinus pressure, drainage back of throat, sore throat, headache x 8/13/2021. )      Headache   This is a new problem. The current episode started in the past 7 days. Associated symptoms include coughing and a sore throat. Pertinent negatives include no abdominal pain, ear pain, fever, nausea or vomiting.   Patient presents urgent care complaining of last 1 week of sinus congestion worsened over the last 3 days.  Complains of sinus headache times last 3 days, denies fevers chills.  Notes some purulent nasal drainage.  Concern for sinus infection.  Past medical history of allergic rhinitis and sinus infection.  Has been compliant with antihistamine and Flonase nasal spray long-term.  Did have Covid vaccination months ago.  Denies loss of taste or smell.  Notes mild dry coughing without much production.  Denies ear pain but complains of fullness.  Denies nausea vomiting abdominal pain diarrhea or rash.  Complains of mild sore throat secondary to drainage.  Patient comes to clinic for treatment for sinusitis as well as Covid testing to allow her to return to work.  She describes sinus headache, has had temporary resolution with OTC Tylenol/NSAIDs.  Has returned over the last 3 days.    Review of Systems   Constitutional: Negative for chills and fever.   HENT: Positive for congestion, sinus pain and sore throat. Negative for ear pain.    Respiratory: Positive for cough. Negative for sputum production, shortness of breath and wheezing.    Gastrointestinal: Negative for abdominal pain, diarrhea, nausea and vomiting.   Skin: Negative for rash.   Neurological: Positive for headaches ( ).   Endo/Heme/Allergies: Positive for environmental allergies.       No Known Allergies     Objective:   /70 (BP Location: Left arm, Patient Position: Sitting, BP Cuff Size: Adult)   Pulse 88   Temp 37 °C (98.6 °F) (Temporal)   Resp 14   Ht  "1.626 m (5' 4\")   Wt 106 kg (234 lb)   SpO2 97%   BMI 40.17 kg/m²     Physical Exam  Vitals and nursing note reviewed.   Constitutional:       General: She is not in acute distress.     Appearance: She is well-developed. She is not diaphoretic.   HENT:      Head: Normocephalic and atraumatic.      Right Ear: Ear canal and external ear normal. Tympanic membrane is bulging. Tympanic membrane is not erythematous.      Left Ear: Ear canal and external ear normal. Tympanic membrane is bulging. Tympanic membrane is not erythematous.      Nose:      Right Sinus: Maxillary sinus tenderness and frontal sinus tenderness present.      Left Sinus: Maxillary sinus tenderness and frontal sinus tenderness present.      Mouth/Throat:      Pharynx: Uvula midline. Posterior oropharyngeal erythema ( PND) present. No oropharyngeal exudate.      Tonsils: No tonsillar abscesses.   Eyes:      General: Lids are normal. No scleral icterus.        Right eye: No discharge.         Left eye: No discharge.      Conjunctiva/sclera: Conjunctivae normal.   Pulmonary:      Effort: Pulmonary effort is normal. No respiratory distress.      Breath sounds: No decreased breath sounds, wheezing, rhonchi or rales.   Musculoskeletal:         General: Normal range of motion.      Cervical back: Neck supple.   Lymphadenopathy:      Cervical: Cervical adenopathy ( mild bilat) present.   Skin:     General: Skin is warm and dry.      Coloration: Skin is not pale.      Findings: No erythema.   Neurological:      Mental Status: She is alert and oriented to person, place, and time. She is not disoriented.   Psychiatric:         Speech: Speech normal.         Behavior: Behavior normal.       Covid pending  Assessment/Plan:   1. Nonintractable headache, unspecified chronicity pattern, unspecified headache type  - COVID/SARS CoV-2 PCR; Future    2. Encounter for screening for COVID-19  - COVID/SARS CoV-2 PCR; Future    3. Acute pansinusitis, recurrence not " specified  - amoxicillin-clavulanate (AUGMENTIN) 875-125 MG Tab; Take 1 Tablet by mouth 2 times a day.  Dispense: 20 Tablet; Refill: 0    4. Allergic rhinitis, unspecified seasonality, unspecified trigger  - azelastine (ASTELIN) 137 MCG/SPRAY nasal spray; Administer 1 Spray into affected nostril(S) 2 times a day.  Dispense: 30 mL; Refill: 1  Supportive care is reviewed with patient/caregiver - recommend to push PO fluids and electrolytes, over-the-counter symptom support medications reviewed, ER precautions with worsened symptoms, quarantine recommendations reviewed, sent with letter, [facility] for results of testing  Contingent antibiotic prescription given to patient to fill upon meeting criteria of guidelines discussed.   If filling,  take full course of Rx, take with probiotics, observe for resolution  Return to clinic with lack of resolution or progression of symptoms.  ER precautions with any worsening symptoms are reviewed with patient/caregiver and they do express understanding    I have worn an N95 mask, gloves and eye protection for the entire encounter with this patient.     Differential diagnosis, natural history, supportive care, and indications for immediate follow-up discussed.

## 2021-08-16 NOTE — LETTER
August 16, 2021       Patient: Minnie Edward   YOB: 1990   Date of Visit: 8/16/2021         To Whom it May Concern,   Your employee/student was seen in our clinic today. A concern for COVID-19 has been identified and testing is in progress.?   ?  We are asking you to excuse absences while following self-isolation protocol per Center for Disease Control (CDC) guidelines. Your employee/student will be able to access test results through our electronic delivery system called [employer]   ?  If the results of testing are negative, and once there has been no fever (temperature >100.4 F) for at least 72 hours without treatment, and no vomiting or diarrhea for at least 48 hours, then return to work/school is approved.   ?  If the results of testing are positive then your employee/student will be contacted by the [facility] or [facility] department for further instructions on duration of self-isolation and return to work/school protocol. In general, this will also follow the CDC guidelines with a minimum of 10 days from the onset of symptoms and without fever, vomiting, or diarrhea as above.?   ?  In general, repeat testing is not necessary and not offered through our [facility].?   ?  This is the only note that will be provided from [facility] for this visit. Your employee/student will require an appointment with a primary care provider if FMLA or disability forms are required.   ?  Sincerely,?             Harjeet Farrar P.A.-C.  Electronically Signed

## 2021-08-17 DIAGNOSIS — R51.9 NONINTRACTABLE HEADACHE, UNSPECIFIED CHRONICITY PATTERN, UNSPECIFIED HEADACHE TYPE: ICD-10-CM

## 2021-08-17 DIAGNOSIS — Z11.52 ENCOUNTER FOR SCREENING FOR COVID-19: ICD-10-CM

## 2021-10-07 ENCOUNTER — HOSPITAL ENCOUNTER (EMERGENCY)
Facility: MEDICAL CENTER | Age: 31
End: 2021-10-07
Attending: EMERGENCY MEDICINE
Payer: COMMERCIAL

## 2021-10-07 ENCOUNTER — APPOINTMENT (OUTPATIENT)
Dept: RADIOLOGY | Facility: IMAGING CENTER | Age: 31
End: 2021-10-07
Attending: PHYSICIAN ASSISTANT
Payer: COMMERCIAL

## 2021-10-07 ENCOUNTER — APPOINTMENT (OUTPATIENT)
Dept: RADIOLOGY | Facility: MEDICAL CENTER | Age: 31
End: 2021-10-07
Attending: EMERGENCY MEDICINE
Payer: COMMERCIAL

## 2021-10-07 ENCOUNTER — OFFICE VISIT (OUTPATIENT)
Dept: URGENT CARE | Facility: CLINIC | Age: 31
End: 2021-10-07
Payer: COMMERCIAL

## 2021-10-07 VITALS
HEART RATE: 103 BPM | SYSTOLIC BLOOD PRESSURE: 125 MMHG | BODY MASS INDEX: 40.19 KG/M2 | TEMPERATURE: 98 F | OXYGEN SATURATION: 97 % | WEIGHT: 234.13 LBS | RESPIRATION RATE: 18 BRPM | DIASTOLIC BLOOD PRESSURE: 77 MMHG

## 2021-10-07 VITALS
HEART RATE: 120 BPM | SYSTOLIC BLOOD PRESSURE: 112 MMHG | HEIGHT: 64 IN | BODY MASS INDEX: 40.29 KG/M2 | TEMPERATURE: 99.1 F | OXYGEN SATURATION: 95 % | RESPIRATION RATE: 24 BRPM | WEIGHT: 236 LBS | DIASTOLIC BLOOD PRESSURE: 70 MMHG

## 2021-10-07 DIAGNOSIS — U07.1 COVID-19 VIRUS INFECTION: Primary | ICD-10-CM

## 2021-10-07 DIAGNOSIS — R06.02 SHORTNESS OF BREATH: ICD-10-CM

## 2021-10-07 DIAGNOSIS — R06.82 TACHYPNEA: ICD-10-CM

## 2021-10-07 DIAGNOSIS — Z20.822 EXPOSURE TO COVID-19 VIRUS: ICD-10-CM

## 2021-10-07 DIAGNOSIS — R05.9 COUGH: ICD-10-CM

## 2021-10-07 DIAGNOSIS — J45.901 ACUTE EXACERBATION OF EXTRINSIC ASTHMA: ICD-10-CM

## 2021-10-07 DIAGNOSIS — U07.1 COVID-19 VIRUS INFECTION: ICD-10-CM

## 2021-10-07 DIAGNOSIS — B97.89 VIRAL RESPIRATORY ILLNESS: ICD-10-CM

## 2021-10-07 DIAGNOSIS — J98.8 VIRAL RESPIRATORY ILLNESS: ICD-10-CM

## 2021-10-07 LAB
EXTERNAL QUALITY CONTROL: NORMAL
SARS-COV+SARS-COV-2 AG RESP QL IA.RAPID: POSITIVE

## 2021-10-07 PROCEDURE — 700111 HCHG RX REV CODE 636 W/ 250 OVERRIDE (IP): Performed by: EMERGENCY MEDICINE

## 2021-10-07 PROCEDURE — 99215 OFFICE O/P EST HI 40 MIN: CPT | Mod: CS | Performed by: PHYSICIAN ASSISTANT

## 2021-10-07 PROCEDURE — 71046 X-RAY EXAM CHEST 2 VIEWS: CPT | Mod: TC | Performed by: PHYSICIAN ASSISTANT

## 2021-10-07 PROCEDURE — 87426 SARSCOV CORONAVIRUS AG IA: CPT | Performed by: PHYSICIAN ASSISTANT

## 2021-10-07 PROCEDURE — 71045 X-RAY EXAM CHEST 1 VIEW: CPT

## 2021-10-07 PROCEDURE — 700102 HCHG RX REV CODE 250 W/ 637 OVERRIDE(OP): Performed by: EMERGENCY MEDICINE

## 2021-10-07 PROCEDURE — 99284 EMERGENCY DEPT VISIT MOD MDM: CPT

## 2021-10-07 PROCEDURE — A9270 NON-COVERED ITEM OR SERVICE: HCPCS | Performed by: EMERGENCY MEDICINE

## 2021-10-07 RX ORDER — ALBUTEROL SULFATE 90 UG/1
2 AEROSOL, METERED RESPIRATORY (INHALATION) EVERY 6 HOURS PRN
Qty: 8.5 G | Refills: 0 | Status: SHIPPED | OUTPATIENT
Start: 2021-10-07 | End: 2021-10-07

## 2021-10-07 RX ORDER — DEXAMETHASONE SODIUM PHOSPHATE 10 MG/ML
10 INJECTION, SOLUTION INTRAMUSCULAR; INTRAVENOUS ONCE
Status: DISCONTINUED | OUTPATIENT
Start: 2021-10-07 | End: 2021-10-07

## 2021-10-07 RX ORDER — ALBUTEROL SULFATE 90 UG/1
10 AEROSOL, METERED RESPIRATORY (INHALATION) ONCE
Status: COMPLETED | OUTPATIENT
Start: 2021-10-07 | End: 2021-10-07

## 2021-10-07 RX ORDER — ALBUTEROL SULFATE 90 UG/1
4 AEROSOL, METERED RESPIRATORY (INHALATION) ONCE
Status: COMPLETED | OUTPATIENT
Start: 2021-10-07 | End: 2021-10-07

## 2021-10-07 RX ORDER — DEXAMETHASONE SODIUM PHOSPHATE 4 MG/ML
8 INJECTION, SOLUTION INTRA-ARTICULAR; INTRALESIONAL; INTRAMUSCULAR; INTRAVENOUS; SOFT TISSUE EVERY 6 HOURS
Status: DISCONTINUED | OUTPATIENT
Start: 2021-10-07 | End: 2021-10-07 | Stop reason: HOSPADM

## 2021-10-07 RX ORDER — INHALER,ASSIST DEVICE,MED MASK
1 SPACER (EA) MISCELLANEOUS ONCE
OUTPATIENT
Start: 2021-10-07 | End: 2021-10-10

## 2021-10-07 RX ORDER — ALBUTEROL SULFATE 2.5 MG/3ML
2.5 SOLUTION RESPIRATORY (INHALATION) EVERY 4 HOURS PRN
Qty: 20 EACH | Refills: 0 | Status: SHIPPED | OUTPATIENT
Start: 2021-10-07 | End: 2022-04-27

## 2021-10-07 RX ORDER — BENZONATATE 100 MG/1
200 CAPSULE ORAL 3 TIMES DAILY PRN
Qty: 60 CAPSULE | Refills: 0 | Status: SHIPPED | OUTPATIENT
Start: 2021-10-07 | End: 2021-10-07

## 2021-10-07 RX ADMIN — ALBUTEROL SULFATE 4 PUFF: 90 AEROSOL, METERED RESPIRATORY (INHALATION) at 10:28

## 2021-10-07 RX ADMIN — ALBUTEROL SULFATE 10 PUFF: 90 AEROSOL, METERED RESPIRATORY (INHALATION) at 18:13

## 2021-10-07 RX ADMIN — DEXAMETHASONE SODIUM PHOSPHATE 8 MG: 4 INJECTION, SOLUTION INTRA-ARTICULAR; INTRALESIONAL; INTRAMUSCULAR; INTRAVENOUS; SOFT TISSUE at 18:14

## 2021-10-07 ASSESSMENT — ENCOUNTER SYMPTOMS
FEVER: 1
MYALGIAS: 1
HEADACHES: 1
COUGH: 1
SORE THROAT: 1
SHORTNESS OF BREATH: 1
CHILLS: 1

## 2021-10-07 ASSESSMENT — FIBROSIS 4 INDEX
FIB4 SCORE: 0.27
FIB4 SCORE: 0.27

## 2021-10-07 NOTE — PROGRESS NOTES
"Subjective:     Minnie Edward  is a 31 y.o. female who presents for Cough (x 2days, cough, shortness of breath, headaches, bodyaches and chills)       Cough  Associated symptoms include chills, a fever, headaches, myalgias, a sore throat and shortness of breath. Pertinent negatives include no chest pain.   Ms. Edward is a very pleasant 31-year-old female with known history of asthma who presents to urgent care with 2-day history of fever, chills, fatigue, body aches, nasal congestion, headache, cough and extreme shortness of breath.  Patient son has unfortunately tested positive for COVID-19.  Patient is Covid vaccinated.      Review of Systems   Constitutional: Positive for chills, fever and malaise/fatigue.   HENT: Positive for congestion and sore throat.    Respiratory: Positive for cough and shortness of breath.    Cardiovascular: Negative for chest pain.   Musculoskeletal: Positive for myalgias.   Neurological: Positive for headaches.   All other systems reviewed and are negative.    No Known Allergies  Past medical history, family history, surgical history and social history are reviewed and updated in the record today.     Objective:   /70 (BP Location: Left arm, Patient Position: Sitting, BP Cuff Size: Large adult)   Pulse 86   Temp 37.3 °C (99.1 °F) (Temporal)   Resp 16   Ht 1.626 m (5' 4\")   Wt 107 kg (236 lb)   SpO2 95%   BMI 40.51 kg/m²   Physical Exam  Vitals and nursing note reviewed.   Constitutional:       General: She is not in acute distress.     Appearance: She is well-developed. She is ill-appearing. She is not toxic-appearing.   HENT:      Head: Normocephalic.      Right Ear: Tympanic membrane, ear canal and external ear normal.      Left Ear: Tympanic membrane, ear canal and external ear normal.      Nose: Mucosal edema, congestion and rhinorrhea present.      Right Turbinates: Swollen.      Left Turbinates: Swollen.      Mouth/Throat:      Lips: Pink. No lesions. "      Mouth: Mucous membranes are moist.      Pharynx: Oropharynx is clear. Uvula midline. Posterior oropharyngeal erythema present. No oropharyngeal exudate.      Tonsils: No tonsillar exudate. 1+ on the right. 1+ on the left.   Eyes:      General: Lids are normal.      Extraocular Movements: Extraocular movements intact.      Conjunctiva/sclera: Conjunctivae normal.      Pupils: Pupils are equal, round, and reactive to light.   Cardiovascular:      Rate and Rhythm: Normal rate and regular rhythm.      Heart sounds: Normal heart sounds. No murmur heard.   No friction rub. No gallop.    Pulmonary:      Effort: Pulmonary effort is normal. No respiratory distress.      Breath sounds: Decreased air movement present. Wheezing and rhonchi present. No decreased breath sounds or rales.      Comments: On initial exam patient is tachypneic with respiratory rate of 26, she has decreased air movement with mild expiratory wheezes and scattered rhonchi.  After 4 inhalations of albuterol, repeat exam reveals continued tachypnea at 24 at rest with persistent expiratory wheezes and occasional scattered rhonchi.  Abdominal:      General: Bowel sounds are normal. There is no distension.      Palpations: Abdomen is soft. There is no mass.      Tenderness: There is no abdominal tenderness. There is no guarding or rebound.   Musculoskeletal:         General: No tenderness or deformity. Normal range of motion.      Cervical back: Normal range of motion and neck supple.   Lymphadenopathy:      Head:      Right side of head: No submental, submandibular or tonsillar adenopathy.      Left side of head: No submental, submandibular or tonsillar adenopathy.      Cervical: No cervical adenopathy.      Upper Body:      Right upper body: No supraclavicular adenopathy.      Left upper body: No supraclavicular adenopathy.   Skin:     General: Skin is warm and dry.      Findings: No rash.   Neurological:      Mental Status: She is alert and oriented  to person, place, and time.      Cranial Nerves: Cranial nerves are intact. No cranial nerve deficit.      Sensory: Sensation is intact. No sensory deficit.      Motor: Motor function is intact.      Coordination: Coordination is intact. Coordination normal.      Gait: Gait is intact.   Psychiatric:         Attention and Perception: Attention normal.         Mood and Affect: Mood and affect normal.         Speech: Speech normal.         Behavior: Behavior normal. Behavior is cooperative.         Thought Content: Thought content normal.         Judgment: Judgment normal.          Assessment/Plan:   1. COVID-19 virus infection  - POCT SARS-COV Antigen SILVINO (Symptomatic Only)  - DX-CHEST-2 VIEWS; Future    2. Exposure to COVID-19 virus  - POCT SARS-COV Antigen SILVINO (Symptomatic Only)  - DX-CHEST-2 VIEWS; Future    3. Tachypnea    4. Cough  - POCT SARS-COV Antigen SILVINO (Symptomatic Only)  - DX-CHEST-2 VIEWS; Future  - benzonatate (TESSALON) 100 MG Cap; Take 2 Capsules by mouth 3 times a day as needed.  Dispense: 60 Capsule; Refill: 0    5. Shortness of breath  - POCT SARS-COV Antigen SILVINO (Symptomatic Only)  - DX-CHEST-2 VIEWS; Future    6. Acute exacerbation of extrinsic asthma  - albuterol inhaler 4 Puff  - albuterol 108 (90 Base) MCG/ACT Aero Soln inhalation aerosol; Inhale 2 Puffs every 6 hours as needed for Shortness of Breath.  Dispense: 8.5 g; Refill: 0  - AEROCHAMBER PLUS-MEDIUM MASK MISC 1 Each  - POCT SARS-COV Antigen SILVINO (Symptomatic Only)  - DX-CHEST-2 VIEWS; Future      On intake patient is noted to be tachypneic at 26.  After albuterol, repeat vital signs reveal continued tachypnea at 24.  Patient is challenged with a 1 minute walk oxygen does not drop below 95% however she does increase pulse to between 120 and 130.    Results for orders placed or performed in visit on 10/07/21   POCT SARS-COV Antigen SILVINO (Symptomatic Only)   Result Value Ref Range    Internal  Pass     SARS-COV ANTIGEN SILVINO  Positive        Given continued tachypnea with significant shortness of breath here in clinic I do believe the patient warrants further evaluation at a higher level of care and I have encouraged her to present to the emergency department immediately for further evaluation and management.  We did have a brief conversation about monoclonal antibodies and she would like to consider this, printed information regarding monoclonal antibodies patient education is given to patient for her reading today.  Patient will present to [facility].  Report is called to [facility], FAIZA Bahena    Previous urgent care visit note from date of service 8/16/21 with Covid testing negative for Covid reviewed by myself as part of today's visit.    The patient demonstrated a good understanding and agreed with the treatment plan.  Please note that this note was created using voice recognition speech to text software. Every effort has been made to correct obvious errors.  However, I expect there are errors of grammar and possibly context that were not discovered prior to finalizing the note  DOUG Collins PA-C

## 2021-10-07 NOTE — ED TRIAGE NOTES
Pt to triage .  Chief Complaint   Patient presents with   • Sent from Urgent Care   • Shortness of Breath     pt c/o cough, sob and muscle aches since Tuesday sent from urgent care for further evaluation    • Cough   • Muscle Ache   • Coronavirus Screening     pt tested positive for covid today

## 2021-10-08 NOTE — DISCHARGE INSTRUCTIONS
Quarantine Criteria:  If your COVID test is positive self isolation at home is required.  Per the CDC guidelines, you must quarantine at home until the following conditions have been met:  It has been 10 days since the onset of symptoms  You have been fever free for 24 hours  Your symptoms are improving.     Self Care:  You need to get plenty of rest.   You should take Tylenol as needed for fever and body aches  Drink plenty of fluids and have good nutrition    Community Care:  If you have no choice and have to go out to get medications or food, please wear a mask and wash your hands frequently.    Please tell everyone you know to wear a mask when in public to help decrease transmission of the virus.  Per CDC guidelines, you are not required to provide a healthcare provider’s note to validate your illness or to return to work, as healthcare provider offices and medical facilities may be extremely busy and not able to provide such documentation in a timely way.    Return to the ER Precautions:  Return to the ED if the is any significant shortness of breath, worsening symptoms, not improving as expected or you develop any concerning symptoms.   If your symptoms worsen to a point that you become so short of breath that you can only walk very short distances prior to fatigue or feel you were unable to manage your symptoms at home please return to the emergency department. For a more objective approach you can buy a pulse oximeter online. [employer] has multiple to choose from. If your oxygen saturation in these devices is persistently below 90% please return to the ER.

## 2021-10-08 NOTE — ED PROVIDER NOTES
ER Provider Note     Scribed for Arpit Reilly M.D. by Jose Hicks. 10/7/2021, 6:01 PM.    Primary Care Provider: Rubens Gaspar M.D.  Means of Arrival: Walk-in   History obtained from: Patient  History limited by: None     CHIEF COMPLAINT  Chief Complaint   Patient presents with    Sent from Urgent Care    Shortness of Breath     pt c/o cough, sob and muscle aches since Tuesday sent from urgent care for further evaluation     Cough    Muscle Ache    Coronavirus Screening     pt tested positive for covid today      [facility]  Minnie Edward is a 31 y.o. female with a history of asthma who presents to the Emergency Department for shortness of breath onset 2 days ago. She reports associated cough and generalized body aches with similar onset. She states her son tested positive 3 days ago and she was exposed. The patient states her symptoms worsened severely so she visited urgent care. She was given albuterol which minimally alleviated her symptoms. The patient had a coronavirus screening today and tested positive. She was sent from urgent care to the ED for evaluation. The patient states she received both doses of the Pfizer vaccine. She denies any associated sore throat, diarrhea, or fever.    REVIEW OF SYSTEMS  See HPI for further details.    PAST MEDICAL HISTORY   has a past medical history of Asthma, Elevated glycohemoglobin, Frequent headaches, History of chickenpox (2011), Impaired glucose tolerance, and PCOS (polycystic ovarian syndrome) (12/15/2009).    SURGICAL HISTORY  patient denies any surgical history    SOCIAL HISTORY  Social History     Tobacco Use    Smoking status: Never Smoker    Smokeless tobacco: Never Used   Vaping Use    Vaping Use: Never used   Substance Use Topics    Alcohol use: No     Alcohol/week: 0.0 oz    Drug use: No      Social History     Substance and Sexual Activity   Drug Use No     FAMILY HISTORY  Family History   Problem Relation Age of Onset    Diabetes Mother      Thyroid Mother     Diabetes Father     Hypertension Father     Heart Disease Father     Diabetes Brother     Stroke Paternal Grandmother     Other Sister         occipital headaches     CURRENT MEDICATIONS  Home Medications       Reviewed by Va Rodriguez R.N. (Registered Nurse) on 10/07/21 at 1152  Med List Status: Partial     Medication Last Dose Status   AEROCHAMBER PLUS-MEDIUM MASK MISC 1 Each  Active   escitalopram (LEXAPRO) 20 MG tablet 10/7/2021 Active   ibuprofen (MOTRIN) 800 MG Tab prn Active   levothyroxine (SYNTHROID) 50 MCG Tab  Active   metFORMIN ER (GLUCOPHAGE XR) 500 MG TABLET SR 24 HR 10/7/2021 Active   omeprazole (PRILOSEC) 40 MG delayed-release capsule 10/7/2021 Active                  ALLERGIES  No Known Allergies    PHYSICAL EXAM  VITAL SIGNS: /81   Pulse 89   Temp 36.8 °C (98.2 °F) (Temporal)   Resp (!) 22   Wt 106 kg (234 lb 2.1 oz)   SpO2 95%   BMI 40.19 kg/m²    Constitutional: Alert in no apparent distress.  HENT: No signs of trauma, Bilateral external ears normal, Nose normal.   Eyes: Pupils are equal and reactive, Conjunctiva normal, Non-icteric.   Cardiovascular: Regular rate and rhythm, no palpable thrill  Thorax & Lungs: Diminished lung sounds throughout,  No chest tenderness.   Abdomen: Bowel sounds normal, Soft, No tenderness, No masses, No pulsatile masses. No peritoneal signs.  Skin: Warm, Dry, No erythema, No rash.    Musculoskeletal: Good range of motion in all major joints. No tenderness to palpation or major deformities noted.   Neurologic: Alert , Normal motor function, Normal sensory function, No focal deficits noted.   Psychiatric: Affect normal, Judgment normal, Mood normal.     DIAGNOSTIC STUDIES / PROCEDURES    RADIOLOGY  DX-CHEST-PORTABLE (1 VIEW)   Final Result      No acute cardiopulmonary process is seen.         The radiologist's interpretation of all radiological studies have been reviewed by me.    COURSE & MEDICAL DECISION MAKING  Pertinent Labs &  Imaging studies reviewed. (See chart for details)    This is a 31 y.o. female that presents with increased work of breathing with a history of asthma and a COVID infection. The patient will be given a large amount of inhaled albuterol and regular x-ray to evaluate for worsening COVID pneumonia. Patient is not hypoxic at this time. The patient is not meet criteria for admission. We'll get dexamethasone as well..     6:01 PM - Patient seen and examined at bedside. Ordered DX-chest to evaluate.  Patient will be medicated with albuterol inhaler and Decadron 10mg for her symptoms.     6:36 PM - I reevaluated the patient at bedside. I discussed plan for discharge and follow up as outlined below. The patient is stable for discharge at this time and will return for any new or worsening symptoms. Patient verbalizes understanding and support with my plan for discharge.     X-ray is negative. The patient is feeling much improved after the dexamethasone as well as a albuterol. Will send him with nebulizers and she does have a inhaler at home.    The patient is referred to a primary physician for blood pressure management, diabetic screening, and for all other preventative health concerns.    DISPOSITION:  Patient will be discharged home in stable condition.    FOLLOW UP:  Rubens Gaspar M.D.  [address] [place] [id number] [phone]    In 2 days    FINAL IMPRESSION  1. COVID-19 virus infection       Jose VASQUEZ (Rayna), am scribing for, and in the presence of, Arpit Reilly M.D..    Electronically signed by: Jose Hoyos), 10/7/2021    Arpit VASQUEZ M.D. personally performed the services described in this documentation, as scribed by Jose Hicks in my presence, and it is both accurate and complete. E.    The note accurately reflects work and decisions made by me.  Arpit Reilly M.D.  10/7/2021  11:58 PM

## 2021-10-08 NOTE — ED NOTES
Pt Dced at this time. AAOx4, VSS, ambulating with steady gait. Education provided on new rx, f/u care, and all questions addressed. Pt verbalized understanding and ambulated off unit, NADN.

## 2021-10-25 DIAGNOSIS — R73.09 ELEVATED GLYCOHEMOGLOBIN: ICD-10-CM

## 2021-10-25 DIAGNOSIS — R73.02 IGT (IMPAIRED GLUCOSE TOLERANCE): ICD-10-CM

## 2021-10-25 DIAGNOSIS — E55.9 VITAMIN D DEFICIENCY: ICD-10-CM

## 2021-10-25 DIAGNOSIS — E78.1 HYPERTRIGLYCERIDEMIA: ICD-10-CM

## 2021-10-25 DIAGNOSIS — E01.0 THYROMEGALY: ICD-10-CM

## 2021-10-25 DIAGNOSIS — N92.1 MENOMETRORRHAGIA: ICD-10-CM

## 2021-11-09 ENCOUNTER — APPOINTMENT (OUTPATIENT)
Dept: MEDICAL GROUP | Facility: MEDICAL CENTER | Age: 31
End: 2021-11-09
Payer: COMMERCIAL

## 2021-12-14 ENCOUNTER — APPOINTMENT (OUTPATIENT)
Dept: MEDICAL GROUP | Facility: MEDICAL CENTER | Age: 31
End: 2021-12-14
Payer: COMMERCIAL

## 2022-03-01 ENCOUNTER — HOSPITAL ENCOUNTER (OUTPATIENT)
Dept: LAB | Facility: MEDICAL CENTER | Age: 32
End: 2022-03-01
Attending: FAMILY MEDICINE
Payer: COMMERCIAL

## 2022-03-01 DIAGNOSIS — R73.02 IGT (IMPAIRED GLUCOSE TOLERANCE): ICD-10-CM

## 2022-03-01 DIAGNOSIS — R73.09 ELEVATED GLYCOHEMOGLOBIN: ICD-10-CM

## 2022-03-01 DIAGNOSIS — N92.1 MENOMETRORRHAGIA: ICD-10-CM

## 2022-03-01 DIAGNOSIS — E78.1 HYPERTRIGLYCERIDEMIA: ICD-10-CM

## 2022-03-01 LAB
ALBUMIN SERPL BCP-MCNC: 4.2 G/DL (ref 3.2–4.9)
ALBUMIN/GLOB SERPL: 1.5 G/DL
ALP SERPL-CCNC: 58 U/L (ref 30–99)
ALT SERPL-CCNC: 14 U/L (ref 2–50)
ANION GAP SERPL CALC-SCNC: 11 MMOL/L (ref 7–16)
AST SERPL-CCNC: 15 U/L (ref 12–45)
BILIRUB SERPL-MCNC: 0.2 MG/DL (ref 0.1–1.5)
BUN SERPL-MCNC: 12 MG/DL (ref 8–22)
CALCIUM SERPL-MCNC: 8.8 MG/DL (ref 8.5–10.5)
CHLORIDE SERPL-SCNC: 103 MMOL/L (ref 96–112)
CHOLEST SERPL-MCNC: 216 MG/DL (ref 100–199)
CO2 SERPL-SCNC: 24 MMOL/L (ref 20–33)
CREAT SERPL-MCNC: 0.58 MG/DL (ref 0.5–1.4)
ERYTHROCYTE [DISTWIDTH] IN BLOOD BY AUTOMATED COUNT: 46.5 FL (ref 35.9–50)
EST. AVERAGE GLUCOSE BLD GHB EST-MCNC: 143 MG/DL
FASTING STATUS PATIENT QL REPORTED: NORMAL
GLOBULIN SER CALC-MCNC: 2.8 G/DL (ref 1.9–3.5)
GLUCOSE SERPL-MCNC: 101 MG/DL (ref 65–99)
HBA1C MFR BLD: 6.6 % (ref 4–5.6)
HCT VFR BLD AUTO: 42.1 % (ref 37–47)
HDLC SERPL-MCNC: 34 MG/DL
HGB BLD-MCNC: 14.3 G/DL (ref 12–16)
LDLC SERPL CALC-MCNC: 146 MG/DL
MCH RBC QN AUTO: 31.2 PG (ref 27–33)
MCHC RBC AUTO-ENTMCNC: 34 G/DL (ref 33.6–35)
MCV RBC AUTO: 91.9 FL (ref 81.4–97.8)
PLATELET # BLD AUTO: 418 K/UL (ref 164–446)
PMV BLD AUTO: 9.8 FL (ref 9–12.9)
POTASSIUM SERPL-SCNC: 4.5 MMOL/L (ref 3.6–5.5)
PROT SERPL-MCNC: 7 G/DL (ref 6–8.2)
RBC # BLD AUTO: 4.58 M/UL (ref 4.2–5.4)
SODIUM SERPL-SCNC: 138 MMOL/L (ref 135–145)
TRIGL SERPL-MCNC: 182 MG/DL (ref 0–149)
WBC # BLD AUTO: 9.1 K/UL (ref 4.8–10.8)

## 2022-03-01 PROCEDURE — 80053 COMPREHEN METABOLIC PANEL: CPT

## 2022-03-01 PROCEDURE — 36415 COLL VENOUS BLD VENIPUNCTURE: CPT

## 2022-03-01 PROCEDURE — 83036 HEMOGLOBIN GLYCOSYLATED A1C: CPT

## 2022-03-01 PROCEDURE — 85027 COMPLETE CBC AUTOMATED: CPT

## 2022-03-01 PROCEDURE — 80061 LIPID PANEL: CPT

## 2022-03-03 ENCOUNTER — OFFICE VISIT (OUTPATIENT)
Dept: MEDICAL GROUP | Facility: MEDICAL CENTER | Age: 32
End: 2022-03-03
Payer: COMMERCIAL

## 2022-03-03 VITALS
WEIGHT: 240.08 LBS | OXYGEN SATURATION: 96 % | DIASTOLIC BLOOD PRESSURE: 60 MMHG | SYSTOLIC BLOOD PRESSURE: 108 MMHG | HEART RATE: 71 BPM | BODY MASS INDEX: 40.99 KG/M2 | HEIGHT: 64 IN | TEMPERATURE: 97.7 F

## 2022-03-03 DIAGNOSIS — E01.0 THYROMEGALY: ICD-10-CM

## 2022-03-03 DIAGNOSIS — E66.01 MORBID OBESITY WITH BMI OF 40.0-44.9, ADULT (HCC): ICD-10-CM

## 2022-03-03 DIAGNOSIS — F41.8 SITUATIONAL ANXIETY: ICD-10-CM

## 2022-03-03 DIAGNOSIS — E78.2 MIXED HYPERLIPIDEMIA: ICD-10-CM

## 2022-03-03 DIAGNOSIS — E11.9 TYPE 2 DIABETES MELLITUS WITHOUT COMPLICATION, WITHOUT LONG-TERM CURRENT USE OF INSULIN (HCC): ICD-10-CM

## 2022-03-03 DIAGNOSIS — J45.30 MILD PERSISTENT ASTHMA WITHOUT COMPLICATION: ICD-10-CM

## 2022-03-03 DIAGNOSIS — Z86.16 HISTORY OF 2019 NOVEL CORONAVIRUS DISEASE (COVID-19): ICD-10-CM

## 2022-03-03 DIAGNOSIS — E55.9 VITAMIN D DEFICIENCY: ICD-10-CM

## 2022-03-03 DIAGNOSIS — E28.2 PCOS (POLYCYSTIC OVARIAN SYNDROME): Chronic | ICD-10-CM

## 2022-03-03 PROBLEM — R73.03 PREDIABETES: Status: RESOLVED | Noted: 2018-07-31 | Resolved: 2022-03-03

## 2022-03-03 PROBLEM — E78.1 HYPERTRIGLYCERIDEMIA: Status: RESOLVED | Noted: 2017-09-21 | Resolved: 2022-03-03

## 2022-03-03 PROBLEM — R73.02 IGT (IMPAIRED GLUCOSE TOLERANCE): Status: RESOLVED | Noted: 2017-05-31 | Resolved: 2022-03-03

## 2022-03-03 PROBLEM — R73.09 ELEVATED GLYCOHEMOGLOBIN: Status: RESOLVED | Noted: 2017-09-21 | Resolved: 2022-03-03

## 2022-03-03 PROCEDURE — 99214 OFFICE O/P EST MOD 30 MIN: CPT | Performed by: FAMILY MEDICINE

## 2022-03-03 RX ORDER — ATORVASTATIN CALCIUM 10 MG/1
10 TABLET, FILM COATED ORAL NIGHTLY
Qty: 90 TABLET | Refills: 3 | Status: SHIPPED | OUTPATIENT
Start: 2022-03-03 | End: 2022-08-03

## 2022-03-03 RX ORDER — ALBUTEROL SULFATE 90 UG/1
2 AEROSOL, METERED RESPIRATORY (INHALATION) EVERY 6 HOURS PRN
Qty: 8.5 G | Refills: 6 | Status: SHIPPED | OUTPATIENT
Start: 2022-03-03 | End: 2022-10-18 | Stop reason: SDUPTHER

## 2022-03-03 RX ORDER — ALPRAZOLAM 0.25 MG/1
0.25 TABLET ORAL 3 TIMES DAILY PRN
Qty: 21 TABLET | Refills: 0 | Status: SHIPPED | OUTPATIENT
Start: 2022-03-03 | End: 2022-04-27 | Stop reason: SDUPTHER

## 2022-03-03 RX ORDER — METFORMIN HYDROCHLORIDE 500 MG/1
500 TABLET, EXTENDED RELEASE ORAL
Qty: 90 TABLET | Refills: 3 | Status: SHIPPED | OUTPATIENT
Start: 2022-03-03 | End: 2022-04-27 | Stop reason: SDUPTHER

## 2022-03-03 RX ORDER — LEVOTHYROXINE SODIUM 0.05 MG/1
50 TABLET ORAL
Qty: 90 TABLET | Refills: 3 | Status: SHIPPED | OUTPATIENT
Start: 2022-03-03 | End: 2023-05-09

## 2022-03-03 ASSESSMENT — PATIENT HEALTH QUESTIONNAIRE - PHQ9
CLINICAL INTERPRETATION OF PHQ2 SCORE: 3
5. POOR APPETITE OR OVEREATING: 1 - SEVERAL DAYS
SUM OF ALL RESPONSES TO PHQ QUESTIONS 1-9: 13

## 2022-03-03 ASSESSMENT — FIBROSIS 4 INDEX: FIB4 SCORE: 0.31

## 2022-03-03 NOTE — PROGRESS NOTES
Seen today.  Multiple stressors.  She is working with a therapist and taking lexapro with some help.  Is having times of severe anxiety and inability to sleep.  Alprazolam last written 4/2021.  Patient uses as needed only.  PDMP review shows no inconsistencies.  The 21 tablet renewal is placed.

## 2022-03-03 NOTE — PROGRESS NOTES
Diabetes Focused Exam:    Chief Complaint   Patient presents with   • Lab Results   • Medication Refill   • Depression     PHQ-2: 3 PHQ-9: 13   • Asthma     Since COVID been present   • Diabetes Mellitus      Subjective:   HPI  Minnie Edward is a 31 y.o. female who presents for follow up of chronic conditions of diabetes mellitus and hyperlipidemia. She indicates that she is feeling well and denies any symptoms referable to the above diagnoses. Specifically denies chest pain, palpitations, dyspnea, orthopnea, PND or peripheral edema. Also denies polyuria, polydipsia, urinary complaints, abdominal complaints, myalgias, numbness, weakness or other related symptoms.     Blood pressure has been excellent.      Continues to have irregular periods.  She recently saw gynecology and will be having tubes removed next week.  There is a cyst, will also be removed.    She has persistent thyromegaly.  She is taking the levothyroxine.    She is having some odd smells since she had COVID last October.  Slowly coming back.  It has stirred up her asthma.  Needs inhaler renewal.  Having to use several times per week and with triggers.    The patient is not taking ASA every day.  She is taking all other medications as prescribed. Patient denies any side effects of medication.  DM: A1c goal <7  Glucose monitoring frequency: does not  Hypoglycemic episodes none clinically  Diabetic complications: none  ACR Albumin/Creatinine Ratio goal <30   HTN: Blood pressure goal <140/<80. Currently Rx ACE/ARB: Not Indicated  Hyperlipidemia:Cholesterol goal LDL <100, total/HDL <5. Currently Rx Statin: Yes  Last eye exam 12/2021  Denies visual blurring, double vision, eye pain and floaters  Last monofilament foot exam: today  Denies foot pain, numbness, calluses, ulcers    See medications and orders placed in encounter report.  Past medical history, family history, social history reviewed and updated as documented in medical  "record.    Current medications including changes today:  Current Outpatient Medications   Medication Sig Dispense Refill   • atorvastatin (LIPITOR) 10 MG Tab Take 1 Tablet by mouth every evening. 90 Tablet 3   • levothyroxine (SYNTHROID) 50 MCG Tab Take 1 Tablet by mouth every morning on an empty stomach. 90 Tablet 3   • albuterol (PROAIR HFA) 108 (90 Base) MCG/ACT Aero Soln inhalation aerosol Inhale 2 Puffs every 6 hours as needed for Shortness of Breath. 8.5 g 6   • metFORMIN ER (GLUCOPHAGE XR) 500 MG TABLET SR 24 HR Take 1 Tablet by mouth 1/2 hour after dinner. 90 Tablet 3   • albuterol (PROVENTIL) 2.5mg/3ml Nebu Soln solution for nebulization Take 3 mL by nebulization every four hours as needed for Shortness of Breath. 20 Each 0   • escitalopram (LEXAPRO) 20 MG tablet Take 1 tablet by mouth every day. 90 tablet 3   • omeprazole (PRILOSEC) 40 MG delayed-release capsule Take 1 Cap by mouth every day. 90 Cap 3   • ibuprofen (MOTRIN) 800 MG Tab Take 1 Tab by mouth every 12 hours as needed for Headache or Inflammation. Take with food 180 Tab 3     No current facility-administered medications for this visit.     Allergies: No Known Allergies   Social History     Tobacco Use   • Smoking status: Never Smoker   • Smokeless tobacco: Never Used   Vaping Use   • Vaping Use: Never used   Substance Use Topics   • Alcohol use: No     Alcohol/week: 0.0 oz   • Drug use: No     Exercise: she is walking more.  She is exercising in the house.  Health Maintenance/Immunizations: discussed, doing okay overall    ROS  Pertinent  ROS findings as above. Is getting some cough and wheeze.       Objective:     OBJECTIVE:  /60 (BP Location: Right arm, Patient Position: Sitting, BP Cuff Size: Adult)   Pulse 71   Temp 36.5 °C (97.7 °F) (Temporal)   Ht 1.626 m (5' 4\")   Wt 109 kg (240 lb 1.3 oz)   SpO2 96%   BMI 41.21 kg/m²  Body mass index is 41.21 kg/m². BMI: severely obese  General: No apparent distress, conversant, cooperative " and pleasant with the examination.  Psych: Alert and oriented x4, judgment and insight normal  Neck: No JVD or bruits, no adenopathy, supple  Thyroid: diffusely enlarged, soft, no mass appreciated  Lungs: negative findings: normal respiratory rate and rhythm, lungs clear to auscultation  Heart: negative findings: regular rate and rhythm, S1 normal, S2 normal, no murmurs, clicks, or gallops  Abdomen: Soft, nontender, no hepatosplenomegaly or masses, normal bowel sounds  Skin: No rashes, no cyanosis, no lesions or ulcers  Extremities: No cyanosis clubbing or edema.   Feet are examined, sensation normal.  No ulcers or fissures.    POC labs: No results found for: POCGLUCOSE       Last labs    Lab Results   Component Value Date/Time    CHOLSTRLTOT 216 (H) 03/01/2022 09:54 AM     (H) 03/01/2022 09:54 AM    HDL 34 (A) 03/01/2022 09:54 AM    TRIGLYCERIDE 182 (H) 03/01/2022 09:54 AM       Lab Results   Component Value Date/Time    SODIUM 138 03/01/2022 09:54 AM    POTASSIUM 4.5 03/01/2022 09:54 AM    GLUCOSE 101 (H) 03/01/2022 09:54 AM    BUNCREATRAT 16 04/28/2021 07:12 AM    BUNCREATRAT 16 03/24/2011 08:02 AM    GLOMRATE >59 03/24/2011 08:02 AM     Lab Results   Component Value Date/Time    HBA1C 6.6 (H) 03/01/2022 09:54 AM    HBA1C 6.4 (H) 04/28/2021 07:12 AM    HBA1C 6.2 (H) 11/17/2020 08:01 AM    HBA1C 6.0 (H) 10/30/2019 04:26 AM    HBA1C 6.1 (H) 11/08/2017 07:54 AM    HBA1C 6.2 (H) 02/28/2017 11:43 AM     No results found for: MALBCRT, MICROALBUR, MICRALB, UMICROALBUM, MICROALBTIM       Assessment/Plan:   Medications, refills, and referrals per orders.   1. Type 2 diabetes mellitus without complication, without long-term current use of insulin (HCC)  Comp Metabolic Panel    Lipid Profile    HEMOGLOBIN A1C    MICROALBUMIN CREAT RATIO URINE    CBC WITHOUT DIFFERENTIAL    Diabetic Monofilament Lower Extremity Exam    metFORMIN ER (GLUCOPHAGE XR) 500 MG TABLET SR 24 HR   2. Mixed hyperlipidemia  atorvastatin  (LIPITOR) 10 MG Tab    Comp Metabolic Panel    Lipid Profile   3. Thyromegaly  TSH    levothyroxine (SYNTHROID) 50 MCG Tab   4. Vitamin D deficiency  VITAMIN D,25 HYDROXY   5. PCOS (polycystic ovarian syndrome)  TSH    Comp Metabolic Panel    CBC WITHOUT DIFFERENTIAL    metFORMIN ER (GLUCOPHAGE XR) 500 MG TABLET SR 24 HR   6. Morbid obesity with BMI of 40.0-44.9, adult (HCC)     7. History of 2019 novel coronavirus disease (COVID-19)     8. Mild persistent asthma without complication  albuterol (PROAIR HFA) 108 (90 Base) MCG/ACT Aero Soln inhalation aerosol     DM2 A1c is at goal   Patient to monitor sugars: not currently needed  Discussed diet, exercise, disease management and weight loss goals.   Education and advise provided today:All medications, side effects and compliance (discussed carefully)  Annual eye examinations at Ophthalmology  Diabetic diet discussed in detail, written exchange diet given  Foot care discussed and Podiatry visits  Glycohemoglobin and other lab monitoring  Labs immediately prior to next visit  Long term diabetic complications  Low cholesterol diet, weight control and daily exercise    Followup:   Do labs and RTC 6 months, sooner should new symptoms or problems arise.

## 2022-04-27 ENCOUNTER — OFFICE VISIT (OUTPATIENT)
Dept: MEDICAL GROUP | Facility: MEDICAL CENTER | Age: 32
End: 2022-04-27
Payer: COMMERCIAL

## 2022-04-27 VITALS
BODY MASS INDEX: 40.29 KG/M2 | SYSTOLIC BLOOD PRESSURE: 106 MMHG | DIASTOLIC BLOOD PRESSURE: 70 MMHG | HEART RATE: 64 BPM | HEIGHT: 64 IN | WEIGHT: 236 LBS

## 2022-04-27 DIAGNOSIS — Z63.4 BEREAVEMENT: ICD-10-CM

## 2022-04-27 DIAGNOSIS — E66.01 MORBID OBESITY WITH BMI OF 40.0-44.9, ADULT (HCC): ICD-10-CM

## 2022-04-27 DIAGNOSIS — E11.9 TYPE 2 DIABETES MELLITUS WITHOUT COMPLICATION, WITHOUT LONG-TERM CURRENT USE OF INSULIN (HCC): ICD-10-CM

## 2022-04-27 DIAGNOSIS — F41.8 SITUATIONAL ANXIETY: ICD-10-CM

## 2022-04-27 DIAGNOSIS — E28.2 PCOS (POLYCYSTIC OVARIAN SYNDROME): Chronic | ICD-10-CM

## 2022-04-27 PROCEDURE — 99214 OFFICE O/P EST MOD 30 MIN: CPT | Performed by: FAMILY MEDICINE

## 2022-04-27 RX ORDER — ALPRAZOLAM 0.25 MG/1
0.25 TABLET ORAL 3 TIMES DAILY PRN
Qty: 21 TABLET | Refills: 0 | Status: SHIPPED | OUTPATIENT
Start: 2022-04-27 | End: 2023-09-25 | Stop reason: SDUPTHER

## 2022-04-27 RX ORDER — ESCITALOPRAM OXALATE 20 MG/1
20 TABLET ORAL DAILY
Qty: 90 TABLET | Refills: 3 | Status: SHIPPED | OUTPATIENT
Start: 2022-04-27 | End: 2023-06-29 | Stop reason: SDUPTHER

## 2022-04-27 RX ORDER — METFORMIN HYDROCHLORIDE 500 MG/1
500 TABLET, EXTENDED RELEASE ORAL 2 TIMES DAILY
Qty: 180 TABLET | Refills: 2 | Status: SHIPPED | OUTPATIENT
Start: 2022-04-27 | End: 2022-12-21 | Stop reason: SDUPTHER

## 2022-04-27 RX ORDER — MULTIVIT-MIN/IRON/FOLIC ACID/K 18-600-40
2000 CAPSULE ORAL DAILY
COMMUNITY

## 2022-04-27 SDOH — SOCIAL STABILITY - SOCIAL INSECURITY: DISSAPEARANCE AND DEATH OF FAMILY MEMBER: Z63.4

## 2022-04-27 ASSESSMENT — FIBROSIS 4 INDEX: FIB4 SCORE: 0.32

## 2022-04-27 NOTE — PROGRESS NOTES
RNEMMY Note    Subjective:     Health changes since last visit/interval Hx: Newly diagnosed with Type 2 Diabetes.  She is having frequent headaches.    Medications (including changes made today)  Current Outpatient Medications   Medication Sig Dispense Refill   • atorvastatin (LIPITOR) 10 MG Tab Take 1 Tablet by mouth every evening. 90 Tablet 3   • levothyroxine (SYNTHROID) 50 MCG Tab Take 1 Tablet by mouth every morning on an empty stomach. 90 Tablet 3   • albuterol (PROAIR HFA) 108 (90 Base) MCG/ACT Aero Soln inhalation aerosol Inhale 2 Puffs every 6 hours as needed for Shortness of Breath. 8.5 g 6   • metFORMIN ER (GLUCOPHAGE XR) 500 MG TABLET SR 24 HR Take 1 Tablet by mouth 1/2 hour after dinner. 90 Tablet 3   • albuterol (PROVENTIL) 2.5mg/3ml Nebu Soln solution for nebulization Take 3 mL by nebulization every four hours as needed for Shortness of Breath. 20 Each 0   • escitalopram (LEXAPRO) 20 MG tablet Take 1 tablet by mouth every day. 90 tablet 3   • omeprazole (PRILOSEC) 40 MG delayed-release capsule Take 1 Cap by mouth every day. 90 Cap 3   • ibuprofen (MOTRIN) 800 MG Tab Take 1 Tab by mouth every 12 hours as needed for Headache or Inflammation. Take with food 180 Tab 3     No current facility-administered medications for this visit.       Taking daily ASA: No  Taking above medications as prescribed: yes  SIDE EFFECTS: Patient denies side effects to medications    Exercise: Had surgery and will be going back to the gym.  Diet: Breakfast is oatmeal, reduced fat milk, berries.  Lunch is protein shake.  Dinner is protein, vegetables, and greek yogurt.  Patient's body mass index is unknown because there is no height or weight on file. Exercise and nutrition counseling were performed at this visit.      Health Maintenance:   Health Maintenance Due   Topic Date Due   • URINE ACR / MICROALBUMIN  Never done           DM:   Last A1c:   Lab Results   Component Value Date/Time    HBA1C 6.6 (H) 03/01/2022 09:54 AM       A1C GOAL: < 6    Glucose monitoring frequency: Not testing    Hypoglycemic episodes: no    Last Retinal Exam: [facility] in December  Daily Foot Exam: Yes   Routine Dental Exams: Yes    No results found for: MICROALBCALC, MALBCRT, MALBEXCR, PHZCUC91, MICROALBUR, MICRALB, UMICROALBUM, MICROALBTIM     ACR Albumin/Creatinine Ratio goal <30     HTN:   Blood pressure goal <140/<80 .   Currently Rx ACE/ARB: No    Dyslipidemia:    Lab Results   Component Value Date/Time    CHOLSTRLTOT 216 (H) 03/01/2022 09:54 AM     (H) 03/01/2022 09:54 AM    HDL 34 (A) 03/01/2022 09:54 AM    TRIGLYCERIDE 182 (H) 03/01/2022 09:54 AM       Lab Results   Component Value Date/Time    SODIUM 138 03/01/2022 09:54 AM    POTASSIUM 4.5 03/01/2022 09:54 AM    CHLORIDE 103 03/01/2022 09:54 AM    CO2 24 03/01/2022 09:54 AM    GLUCOSE 101 (H) 03/01/2022 09:54 AM    BUN 12 03/01/2022 09:54 AM    CREATININE 0.58 03/01/2022 09:54 AM    CREATININE 0.76 03/24/2011 08:02 AM    BUNCREATRAT 16 04/28/2021 07:12 AM    BUNCREATRAT 16 03/24/2011 08:02 AM    GLOMRATE >59 03/24/2011 08:02 AM     Lab Results   Component Value Date/Time    ALKPHOSPHAT 58 03/01/2022 09:54 AM    ASTSGOT 15 03/01/2022 09:54 AM    ALTSGPT 14 03/01/2022 09:54 AM    TBILIRUBIN 0.2 03/01/2022 09:54 AM        Currently Rx Statin: Yes    She  reports that she has never smoked. She has never used smokeless tobacco.    Objective:     Exam:  Monofilament: done   Monofilament testing with a 10 gram force: sensation intact: intact bilaterally  Visual Inspection: Feet without maceration, ulcers, fissures.  Pedal pulses: intact bilaterally    Plan:     Discussed and educated on:   - All medications, side effects and compliance (discussed carefully)  - Annual eye examinations at Ophthalmology  - Home glucose monitoring emphasized  - Weight control and daily exercise    Recommended medication changes: Diabetes education given.  Increase Metformin  mg to BID.  She will follow the  plate method of eating and stay active.  We will consider adding a GLP-1 at her next appointment if her A1c is still above 6.5.

## 2022-04-27 NOTE — PROGRESS NOTES
Diabetes Focused Exam:    Chief Complaint   Patient presents with   • Diabetes      Subjective:   HPI  Minnie Edward is a 32 y.o. female who presents for follow up of chronic conditions of diabetes mellitus, hypertension and hyperlipidemia. She indicates that she is feeling well and denies any symptoms referable to the above diagnoses. Specifically denies chest pain, palpitations, dyspnea, orthopnea, PND or peripheral edema. Also denies polyuria, polydipsia, urinary complaints, abdominal complaints, myalgias, numbness, weakness or other related symptoms.     She is having anxiety and depression regarding the relationship between her and her dad.  She is having trouble shutting off at night.  She is still grieving her brother.  Her father's friends are passing away.    Recent March surgery successful.  Had fallopian tube removal and cyst removal.    The patient is not taking ASA every day.  She is taking all other medications as prescribed. Patient denies any side effects of medication.  DM: A1c goal <7  Glucose monitoring frequency: declines, not necessary  Hypoglycemic episodes none  Diabetic complications: none  ACR Albumin/Creatinine Ratio goal <30   HTN: Blood pressure goal <140/<80. Currently Rx ACE/ARB: No, not indicated  Hyperlipidemia:Cholesterol goal LDL <100, total/HDL <5. Currently Rx Statin: Yes  Last eye exam 12/2021  Denies visual blurring, double vision, eye pain and floaters  Last monofilament foot exam: today with diabetic nurse.  Denies foot pain, numbness, calluses, ulcers    See medications and orders placed in encounter report.  Past medical history, family history, social history reviewed and updated as documented in medical record.    Current medications including changes today:  Current Outpatient Medications   Medication Sig Dispense Refill   • Vitamin D, Cholecalciferol, 50 MCG (2000 UT) Cap Take 2,000 Units by mouth every day.     • metFORMIN ER (GLUCOPHAGE XR) 500 MG  "TABLET SR 24 HR Take 1 Tablet by mouth 2 times a day. 180 Tablet 2   • escitalopram (LEXAPRO) 20 MG tablet Take 1 Tablet by mouth every day. 90 Tablet 3   • ALPRAZolam (XANAX) 0.25 MG Tab Take 1 Tablet by mouth 3 times a day as needed for Anxiety for up to 7 days. 21 Tablet 0   • atorvastatin (LIPITOR) 10 MG Tab Take 1 Tablet by mouth every evening. 90 Tablet 3   • levothyroxine (SYNTHROID) 50 MCG Tab Take 1 Tablet by mouth every morning on an empty stomach. 90 Tablet 3   • albuterol (PROAIR HFA) 108 (90 Base) MCG/ACT Aero Soln inhalation aerosol Inhale 2 Puffs every 6 hours as needed for Shortness of Breath. 8.5 g 6   • omeprazole (PRILOSEC) 40 MG delayed-release capsule Take 1 Cap by mouth every day. 90 Cap 3   • ibuprofen (MOTRIN) 800 MG Tab Take 1 Tab by mouth every 12 hours as needed for Headache or Inflammation. Take with food 180 Tab 3     No current facility-administered medications for this visit.     Allergies: No Known Allergies   Social History     Tobacco Use   • Smoking status: Never Smoker   • Smokeless tobacco: Never Used   Vaping Use   • Vaping Use: Never used   Substance Use Topics   • Alcohol use: No     Alcohol/week: 0.0 oz   • Drug use: No     Exercise: she is walking regularly  Health Maintenance/Immunizations: discussed, is up to date and boosted    ROS  Pertinent  ROS findings as above. Denies chest pain or pressure.       Objective:     OBJECTIVE:  /70   Pulse 64   Ht 1.626 m (5' 4\")   Wt 107 kg (236 lb)   BMI 40.51 kg/m²  Body mass index is 40.51 kg/m². BMI: severely obese  General: No apparent distress, conversant, cooperative and pleasant with the examination.  Psych: Alert and oriented x4, judgment and insight normal  Neck: No JVD or bruits, no adenopathy, supple  Thyroid: normal to inspection and palpation  Lungs: negative findings: normal respiratory rate and rhythm, lungs clear to auscultation  Heart: negative findings: regular rate and rhythm, S1 normal, S2 normal, no " murmurs, clicks, or gallops  Abdomen: Soft, nontender, no hepatosplenomegaly or masses, normal bowel sounds  Skin: No rashes, no cyanosis, no lesions or ulcers  Extremities: No cyanosis clubbing or edema.   Fe    POC labs in past: No results found for: POCGLUCOSE       Last labs    Lab Results   Component Value Date/Time    CHOLSTRLTOT 216 (H) 03/01/2022 09:54 AM     (H) 03/01/2022 09:54 AM    HDL 34 (A) 03/01/2022 09:54 AM    TRIGLYCERIDE 182 (H) 03/01/2022 09:54 AM       Lab Results   Component Value Date/Time    SODIUM 138 03/01/2022 09:54 AM    POTASSIUM 4.5 03/01/2022 09:54 AM    GLUCOSE 101 (H) 03/01/2022 09:54 AM    BUNCREATRAT 16 04/28/2021 07:12 AM    BUNCREATRAT 16 03/24/2011 08:02 AM    GLOMRATE >59 03/24/2011 08:02 AM     Lab Results   Component Value Date/Time    HBA1C 6.6 (H) 03/01/2022 09:54 AM    HBA1C 6.4 (H) 04/28/2021 07:12 AM    HBA1C 6.2 (H) 11/17/2020 08:01 AM    HBA1C 6.0 (H) 10/30/2019 04:26 AM    HBA1C 6.1 (H) 11/08/2017 07:54 AM    HBA1C 6.2 (H) 02/28/2017 11:43 AM     No results found for: MALBCRT, MICROALBUR, MICRALB, UMICROALBUM, MICROALBTIM       Assessment/Plan:   Medications, refills, and referrals per orders.   1. PCOS (polycystic ovarian syndrome)  metFORMIN ER (GLUCOPHAGE XR) 500 MG TABLET SR 24 HR   2. Type 2 diabetes mellitus without complication, without long-term current use of insulin (HCC)  metFORMIN ER (GLUCOPHAGE XR) 500 MG TABLET SR 24 HR   3. Morbid obesity with BMI of 40.0-44.9, adult (HCC)  Patient identified as having weight management issue.  Appropriate orders and counseling given.   4. Situational anxiety  escitalopram (LEXAPRO) 20 MG tablet    ALPRAZolam (XANAX) 0.25 MG Tab   5. Bereavement  escitalopram (LEXAPRO) 20 MG tablet     DM2 A1c is at goal    Patient to monitor sugars: not currently necessary    Discussed diet, exercise, disease management and weight loss goals.  Discussed diet with nurse in detail. . Metformin increase to bid today after  assessment with diabetic nurse.  Education and advise provided today:All medications, side effects and compliance (discussed carefully)  Annual eye examinations at Ophthalmology  Diabetic diet discussed in detail, written exchange diet given  Foot care discussed and Podiatry visits  Glycohemoglobin and other lab monitoring  Labs immediately prior to next visit  Long term diabetic complications  Low cholesterol diet, weight control and daily exercise    Followup:   Do labs and RTC 4 months, sooner should new symptoms or problems arise.

## 2022-05-20 ENCOUNTER — OFFICE VISIT (OUTPATIENT)
Dept: MEDICAL GROUP | Facility: MEDICAL CENTER | Age: 32
End: 2022-05-20
Payer: COMMERCIAL

## 2022-05-20 VITALS
TEMPERATURE: 97.7 F | BODY MASS INDEX: 40.8 KG/M2 | HEART RATE: 76 BPM | DIASTOLIC BLOOD PRESSURE: 64 MMHG | HEIGHT: 64 IN | WEIGHT: 239 LBS | OXYGEN SATURATION: 96 % | SYSTOLIC BLOOD PRESSURE: 118 MMHG

## 2022-05-20 DIAGNOSIS — J01.10 ACUTE NON-RECURRENT FRONTAL SINUSITIS: ICD-10-CM

## 2022-05-20 PROCEDURE — 99213 OFFICE O/P EST LOW 20 MIN: CPT | Performed by: FAMILY MEDICINE

## 2022-05-20 RX ORDER — ONDANSETRON 4 MG/1
4 TABLET, FILM COATED ORAL EVERY 6 HOURS PRN
Qty: 20 EACH | Refills: 0 | Status: SHIPPED | OUTPATIENT
Start: 2022-05-20 | End: 2022-06-03

## 2022-05-20 RX ORDER — AMOXICILLIN AND CLAVULANATE POTASSIUM 875; 125 MG/1; MG/1
1 TABLET, FILM COATED ORAL 2 TIMES DAILY
Qty: 14 TABLET | Refills: 0 | Status: SHIPPED | OUTPATIENT
Start: 2022-05-20 | End: 2022-05-27

## 2022-05-20 ASSESSMENT — FIBROSIS 4 INDEX: FIB4 SCORE: 0.32

## 2022-05-20 NOTE — PROGRESS NOTES
"Subjective:     CC: \"Headache\"    Chief Complaint   Patient presents with   • Headache     Lightheaded, fatigue, nausea, x 3 days       HPI:   Minnie presents today with:    Onset: Tuesday  Started with dizzy and lightheaded, nausea  Headache started Wednesday  Throbbing headache on Thursday  Having difficulty concentrating, wants to lay down and sleep  Has been taking  mg Q6H, 2 tabs Tyelenol  Q6H, has taken edge off  No vomiting, no photophobia, no phonophobia  Frontal headache, no congestion, no drainage, no sore throat  Does feel like typical headache but all the other symptoms are new and intense  Patient has seasonal allergies and is taking Allegra and flonase  She denies chest pain, shortness of breath, weakness, discoordination    Problem   Acute Non-Recurrent Frontal Sinusitis       Current Outpatient Medications Ordered in Epic   Medication Sig Dispense Refill   • ondansetron (ZOFRAN) 4 MG Tab tablet Take 1 Tablet by mouth every 6 hours as needed for Nausea/Vomiting for up to 14 days. 20 Each 0   • amoxicillin-clavulanate (AUGMENTIN) 875-125 MG Tab Take 1 Tablet by mouth 2 times a day for 7 days. 14 Tablet 0   • Vitamin D, Cholecalciferol, 50 MCG (2000 UT) Cap Take 2,000 Units by mouth every day.     • metFORMIN ER (GLUCOPHAGE XR) 500 MG TABLET SR 24 HR Take 1 Tablet by mouth 2 times a day. 180 Tablet 2   • escitalopram (LEXAPRO) 20 MG tablet Take 1 Tablet by mouth every day. 90 Tablet 3   • atorvastatin (LIPITOR) 10 MG Tab Take 1 Tablet by mouth every evening. 90 Tablet 3   • levothyroxine (SYNTHROID) 50 MCG Tab Take 1 Tablet by mouth every morning on an empty stomach. 90 Tablet 3   • albuterol (PROAIR HFA) 108 (90 Base) MCG/ACT Aero Soln inhalation aerosol Inhale 2 Puffs every 6 hours as needed for Shortness of Breath. 8.5 g 6   • omeprazole (PRILOSEC) 40 MG delayed-release capsule Take 1 Cap by mouth every day. 90 Cap 3   • ibuprofen (MOTRIN) 800 MG Tab Take 1 Tab by mouth every 12 hours as " "needed for Headache or Inflammation. Take with food 180 Tab 3     No current Epic-ordered facility-administered medications on file.       Health Maintenance: Acute visit    ROS:  ROS see HPI    Objective:     Exam:  /64 (BP Location: Right arm, Patient Position: Sitting, BP Cuff Size: Adult)   Pulse 76   Temp 36.5 °C (97.7 °F) (Temporal)   Ht 1.626 m (5' 4\")   Wt 108 kg (239 lb)   SpO2 96%   BMI 41.02 kg/m²  Body mass index is 41.02 kg/m².    Physical Exam  Vitals reviewed.   Constitutional:       General: She is not in acute distress.     Appearance: Normal appearance. She is obese. She is ill-appearing. She is not toxic-appearing.   HENT:      Head: Normocephalic and atraumatic.      Ears:      Comments: Fluid noted without bulging or erythema tympanic membrane     Mouth/Throat:      Mouth: Mucous membranes are moist.      Pharynx: Oropharynx is clear. No oropharyngeal exudate or posterior oropharyngeal erythema.   Cardiovascular:      Rate and Rhythm: Normal rate and regular rhythm.      Heart sounds: Normal heart sounds.   Pulmonary:      Effort: Pulmonary effort is normal.      Breath sounds: Normal breath sounds.   Musculoskeletal:      Cervical back: Normal range of motion. No rigidity.   Neurological:      Mental Status: She is alert. Mental status is at baseline.   Psychiatric:         Mood and Affect: Mood normal.         Behavior: Behavior normal.           Assessment & Plan:     32 y.o. female with the following -     Problem List Items Addressed This Visit     Acute non-recurrent frontal sinusitis     Patient with what appears to be brewing sinus infection is made her headaches worsen.  Reminded her that he 800 mg ibuprofen is really meant for every 8 hours at the most.  Okay to continue with Tylenol.  Discussed early saline sinus rinses.  We will provide prescription for antibiotics so she feels like it is becoming a worse infection during the weekend she can fill and begin taking.  We " will write her for Zofran for the nausea may help with headache in combination with Tylenol and ibuprofen.                     Return if symptoms worsen or fail to improve.    Please note that this dictation was created using voice recognition software. I have made every reasonable attempt to correct obvious errors, but I expect that there are errors of grammar and possibly content that I did not discover before finalizing the note.

## 2022-05-20 NOTE — PATIENT INSTRUCTIONS
Saline rinse (neilmed, netipot)    My recommendations for an upper respiratory infection:  - Use a humidifier, especially at night. Cold or warm water humidifiers have the same effect.  - Long Med squeeze bottle sinus rinses twice a day.  - Hot tea + honey + fresh lemon juice  - Throat Coat herbal tea  - Honey by itself has been shown to help fight bugs and provide cough relief  - Chicken noodle soup has also been shown to help fight bugs  - Drink plenty of water  - Vitamin C supplementation   - Tylenol (no more than 3,000 mg in a day) as needed  - Ibuprofen (no more than 2,400 mg in a day) as needed

## 2022-05-20 NOTE — ASSESSMENT & PLAN NOTE
Patient with what appears to be brewing sinus infection is made her headaches worsen.  Reminded her that he 800 mg ibuprofen is really meant for every 8 hours at the most.  Okay to continue with Tylenol.  Discussed early saline sinus rinses.  We will provide prescription for antibiotics so she feels like it is becoming a worse infection during the weekend she can fill and begin taking.  We will write her for Zofran for the nausea may help with headache in combination with Tylenol and ibuprofen.

## 2022-06-14 ENCOUNTER — OFFICE VISIT (OUTPATIENT)
Dept: MEDICAL GROUP | Facility: MEDICAL CENTER | Age: 32
End: 2022-06-14
Payer: COMMERCIAL

## 2022-06-14 VITALS
HEIGHT: 64 IN | DIASTOLIC BLOOD PRESSURE: 68 MMHG | TEMPERATURE: 97.4 F | WEIGHT: 238.2 LBS | HEART RATE: 87 BPM | SYSTOLIC BLOOD PRESSURE: 108 MMHG | BODY MASS INDEX: 40.67 KG/M2 | OXYGEN SATURATION: 94 %

## 2022-06-14 DIAGNOSIS — H10.31 ACUTE BACTERIAL CONJUNCTIVITIS OF RIGHT EYE: ICD-10-CM

## 2022-06-14 PROCEDURE — 99213 OFFICE O/P EST LOW 20 MIN: CPT | Performed by: FAMILY MEDICINE

## 2022-06-14 RX ORDER — POLYMYXIN B SULFATE AND TRIMETHOPRIM 1; 10000 MG/ML; [USP'U]/ML
1 SOLUTION OPHTHALMIC 4 TIMES DAILY
Qty: 10 ML | Refills: 0 | Status: SHIPPED | OUTPATIENT
Start: 2022-06-14 | End: 2022-08-03

## 2022-06-14 ASSESSMENT — FIBROSIS 4 INDEX: FIB4 SCORE: 0.32

## 2022-06-14 NOTE — PROGRESS NOTES
"Subjective:     CC: \"I infection\"    HPI:   Minnie presents today with:    Left Eye:  Onset was roughly Sunday. She took out her contacts and eye felt irritated. Monday had some eye pain. Thought it was a stye. Has tried hot compress. Has become itchy and teary. Is not sticking together. Notes swelling. Recently recovered from URI about 2 weeks ago. No fevers, chills, congestion, sore throat, N/V/D. Has had a stye and pink eye in the past.    No problems updated.    Current Outpatient Medications Ordered in Epic   Medication Sig Dispense Refill   • polymixin-trimethoprim (POLYTRIM) 44167-6.1 UNIT/ML-% Solution Administer 1 Drop into both eyes 4 times a day. 10 mL 0   • Vitamin D, Cholecalciferol, 50 MCG (2000 UT) Cap Take 2,000 Units by mouth every day.     • metFORMIN ER (GLUCOPHAGE XR) 500 MG TABLET SR 24 HR Take 1 Tablet by mouth 2 times a day. 180 Tablet 2   • escitalopram (LEXAPRO) 20 MG tablet Take 1 Tablet by mouth every day. 90 Tablet 3   • atorvastatin (LIPITOR) 10 MG Tab Take 1 Tablet by mouth every evening. 90 Tablet 3   • levothyroxine (SYNTHROID) 50 MCG Tab Take 1 Tablet by mouth every morning on an empty stomach. 90 Tablet 3   • albuterol (PROAIR HFA) 108 (90 Base) MCG/ACT Aero Soln inhalation aerosol Inhale 2 Puffs every 6 hours as needed for Shortness of Breath. 8.5 g 6   • omeprazole (PRILOSEC) 40 MG delayed-release capsule Take 1 Cap by mouth every day. 90 Cap 3   • ibuprofen (MOTRIN) 800 MG Tab Take 1 Tab by mouth every 12 hours as needed for Headache or Inflammation. Take with food 180 Tab 3     No current Epic-ordered facility-administered medications on file.       Health Maintenance: Acute visit    ROS:  ROS see HPI    Objective:     Exam:  /68   Pulse 87   Temp 36.3 °C (97.4 °F) (Temporal)   Ht 1.626 m (5' 4\")   Wt 108 kg (238 lb 3.2 oz)   SpO2 94%   BMI 40.89 kg/m²  Body mass index is 40.89 kg/m².    Physical Exam  Vitals reviewed.   Constitutional:       Appearance: Normal " appearance. She is obese.   Eyes:      General:         Right eye: No discharge.         Left eye: No discharge.      Extraocular Movements: Extraocular movements intact.      Comments: Right medial portion of the eye has some erythema and edema noted not affecting ability to open the eye or move the eye.   Pulmonary:      Effort: Pulmonary effort is normal.   Neurological:      Mental Status: She is alert. Mental status is at baseline.   Psychiatric:         Mood and Affect: Mood normal.         Behavior: Behavior normal.             Assessment & Plan:     32 y.o. female with the following -     Problem List Items Addressed This Visit    None     Visit Diagnoses     Acute bacterial conjunctivitis of right eye      Trial of Polytrim drops 4 times a day.  Discussed with patient signs and symptoms of worsening infection to come back in.  Cautioned handwashing and she hygiene to prevent spread at home or to the other eye.              Return if symptoms worsen or fail to improve.    Please note that this dictation was created using voice recognition software. I have made every reasonable attempt to correct obvious errors, but I expect that there are errors of grammar and possibly content that I did not discover before finalizing the note.

## 2022-07-11 ENCOUNTER — OFFICE VISIT (OUTPATIENT)
Dept: URGENT CARE | Facility: PHYSICIAN GROUP | Age: 32
End: 2022-07-11
Payer: COMMERCIAL

## 2022-07-11 VITALS
TEMPERATURE: 98.4 F | HEIGHT: 64 IN | BODY MASS INDEX: 39.61 KG/M2 | SYSTOLIC BLOOD PRESSURE: 132 MMHG | RESPIRATION RATE: 22 BRPM | DIASTOLIC BLOOD PRESSURE: 78 MMHG | WEIGHT: 232 LBS | OXYGEN SATURATION: 98 % | HEART RATE: 90 BPM

## 2022-07-11 DIAGNOSIS — J45.31 MILD PERSISTENT ASTHMA WITH ACUTE EXACERBATION: ICD-10-CM

## 2022-07-11 DIAGNOSIS — R05.9 COUGH: ICD-10-CM

## 2022-07-11 PROCEDURE — 99214 OFFICE O/P EST MOD 30 MIN: CPT

## 2022-07-11 RX ORDER — BENZONATATE 100 MG/1
100 CAPSULE ORAL 2 TIMES DAILY PRN
Qty: 20 CAPSULE | Refills: 0 | Status: SHIPPED | OUTPATIENT
Start: 2022-07-11 | End: 2022-08-03

## 2022-07-11 RX ORDER — METHYLPREDNISOLONE 4 MG/1
TABLET ORAL
Qty: 21 TABLET | Refills: 0 | Status: SHIPPED | OUTPATIENT
Start: 2022-07-11 | End: 2022-08-03

## 2022-07-11 RX ORDER — ALBUTEROL SULFATE 2.5 MG/3ML
2.5 SOLUTION RESPIRATORY (INHALATION) EVERY 4 HOURS PRN
Qty: 540 ML | Refills: 0 | Status: SHIPPED | OUTPATIENT
Start: 2022-07-11 | End: 2023-09-25 | Stop reason: SDUPTHER

## 2022-07-11 ASSESSMENT — ENCOUNTER SYMPTOMS
MYALGIAS: 0
SORE THROAT: 1
SPUTUM PRODUCTION: 1
COUGH: 1
FEVER: 0
PALPITATIONS: 0
CHILLS: 0
DIARRHEA: 0
VOMITING: 0
WHEEZING: 1
SHORTNESS OF BREATH: 1
NAUSEA: 0

## 2022-07-11 ASSESSMENT — FIBROSIS 4 INDEX: FIB4 SCORE: 0.32

## 2022-07-12 NOTE — PROGRESS NOTES
Subjective     Minnie Edward is a 32 y.o. female who presents with Cough (Progressed for 5 days)          HPI     Patient presents with symptoms started 5 days ago.  She endorses nasal congestion, sore throat, and cough.  She describes her cough as productive of whitish phlegm.  She further endorses shortness of breath at rest and with activity, worsened with activity.  She also noted wheezing.  Patient denies any fever, chills, nausea, vomiting, diarrhea.  Patient reports testing negative for COVID twice at home, with last test done today.  Patient has asthma, reports she has been using albuterol as needed.    Patient's current problem list, medications, and past medical/surgical history were reviewed in Epic.    PMH:  has a past medical history of Asthma, Elevated glycohemoglobin, Frequent headaches, History of 2019 novel coronavirus disease (COVID-19) (3/3/2022), History of chickenpox (2011), Impaired glucose tolerance, Morbid obesity with BMI of 40.0-44.9, adult (HCC) (3/3/2022), and PCOS (polycystic ovarian syndrome) (12/15/2009).  MEDS:   Current Outpatient Medications:   •  polymixin-trimethoprim (POLYTRIM) 09963-1.1 UNIT/ML-% Solution, Administer 1 Drop into both eyes 4 times a day., Disp: 10 mL, Rfl: 0  •  Vitamin D, Cholecalciferol, 50 MCG (2000 UT) Cap, Take 2,000 Units by mouth every day., Disp: , Rfl:   •  metFORMIN ER (GLUCOPHAGE XR) 500 MG TABLET SR 24 HR, Take 1 Tablet by mouth 2 times a day., Disp: 180 Tablet, Rfl: 2  •  escitalopram (LEXAPRO) 20 MG tablet, Take 1 Tablet by mouth every day., Disp: 90 Tablet, Rfl: 3  •  atorvastatin (LIPITOR) 10 MG Tab, Take 1 Tablet by mouth every evening., Disp: 90 Tablet, Rfl: 3  •  levothyroxine (SYNTHROID) 50 MCG Tab, Take 1 Tablet by mouth every morning on an empty stomach., Disp: 90 Tablet, Rfl: 3  •  albuterol (PROAIR HFA) 108 (90 Base) MCG/ACT Aero Soln inhalation aerosol, Inhale 2 Puffs every 6 hours as needed for Shortness of Breath., Disp:  "8.5 g, Rfl: 6  •  omeprazole (PRILOSEC) 40 MG delayed-release capsule, Take 1 Cap by mouth every day., Disp: 90 Cap, Rfl: 3  •  ibuprofen (MOTRIN) 800 MG Tab, Take 1 Tab by mouth every 12 hours as needed for Headache or Inflammation. Take with food, Disp: 180 Tab, Rfl: 3  ALLERGIES: No Known Allergies  SURGHX:   Past Surgical History:   Procedure Laterality Date   • OTHER ABDOMINAL SURGERY  03/2022    ovarian cyst removed and both fallopian tubes     SOCHX:  reports that she has never smoked. She has never used smokeless tobacco. She reports that she does not drink alcohol and does not use drugs.  FH: Reviewed with patient, not pertinent to this visit.       Review of Systems   Constitutional: Negative for chills, fever and malaise/fatigue.   HENT: Positive for congestion and sore throat.    Respiratory: Positive for cough, sputum production (white), shortness of breath and wheezing.    Cardiovascular: Negative for chest pain and palpitations.   Gastrointestinal: Negative for diarrhea, nausea and vomiting.   Musculoskeletal: Negative for myalgias.              Objective     /78 (BP Location: Left arm, Patient Position: Sitting, BP Cuff Size: Adult)   Pulse 90   Temp 36.9 °C (98.4 °F) (Temporal)   Resp (!) 22   Ht 1.626 m (5' 4\")   Wt 105 kg (232 lb)   SpO2 98%   BMI 39.82 kg/m²      Physical Exam  Constitutional:       Appearance: Normal appearance. She is obese.   HENT:      Head: Normocephalic.      Nose: Nose normal.   Eyes:      Extraocular Movements: Extraocular movements intact.   Cardiovascular:      Rate and Rhythm: Normal rate and regular rhythm.      Pulses: Normal pulses.      Heart sounds: Normal heart sounds.   Pulmonary:      Effort: Pulmonary effort is normal.      Breath sounds: Decreased air movement present. Examination of the left-middle field reveals wheezing. Wheezing present.   Musculoskeletal:         General: Normal range of motion.      Cervical back: Normal range of motion. "   Skin:     General: Skin is warm and dry.   Neurological:      General: No focal deficit present.      Mental Status: She is alert.   Psychiatric:         Mood and Affect: Mood normal.         Behavior: Behavior normal.         Judgment: Judgment normal.          Assessment & Plan       1. Mild persistent asthma with acute exacerbation    - methylPREDNISolone (MEDROL DOSEPAK) 4 MG Tablet Therapy Pack; Per  directions on package  Dispense: 21 Tablet; Refill: 0  - albuterol (PROVENTIL) 2.5mg/3ml Nebu Soln solution for nebulization; Take 3 mL by nebulization every four hours as needed for Shortness of Breath.  Dispense: 540 mL; Refill: 0    2. Cough    - benzonatate (TESSALON) 100 MG Cap; Take 1 Capsule by mouth 2 times a day as needed for Cough.  Dispense: 20 Capsule; Refill: 0         Patient's presentation is consistent with acute asthma exacerbation.  She is prescribed prednisone taper.  Instructed to use albuterol every 4-6 hours as needed for SOB, wheezing, chest tightness, and bouts of coughing.  Patient was also given prescription for albuterol nebulization every 4-6 hours at home.  Patient is educated on possible side effects and recommended urinating with nebulization and inhaler.  May take Tessalon Perles as needed for cough twice daily. Discussed treatment plan with patient, she is agreeable and verbalized understanding.  Educated patient on signs and symptoms watch out for, when to return to the clinic or go to the ER.    Electronically Signed by VANESSA Montague

## 2022-07-21 ENCOUNTER — HOSPITAL ENCOUNTER (OUTPATIENT)
Dept: LAB | Facility: MEDICAL CENTER | Age: 32
End: 2022-07-21
Attending: FAMILY MEDICINE
Payer: COMMERCIAL

## 2022-07-21 DIAGNOSIS — E01.0 THYROMEGALY: ICD-10-CM

## 2022-07-21 DIAGNOSIS — E78.2 MIXED HYPERLIPIDEMIA: ICD-10-CM

## 2022-07-21 DIAGNOSIS — E28.2 PCOS (POLYCYSTIC OVARIAN SYNDROME): Chronic | ICD-10-CM

## 2022-07-21 DIAGNOSIS — E11.9 TYPE 2 DIABETES MELLITUS WITHOUT COMPLICATION, WITHOUT LONG-TERM CURRENT USE OF INSULIN (HCC): ICD-10-CM

## 2022-07-21 DIAGNOSIS — E55.9 VITAMIN D DEFICIENCY: ICD-10-CM

## 2022-07-21 LAB
25(OH)D3 SERPL-MCNC: 25 NG/ML (ref 30–100)
ALBUMIN SERPL BCP-MCNC: 4.1 G/DL (ref 3.2–4.9)
ALBUMIN/GLOB SERPL: 1.6 G/DL
ALP SERPL-CCNC: 64 U/L (ref 30–99)
ALT SERPL-CCNC: 24 U/L (ref 2–50)
ANION GAP SERPL CALC-SCNC: 12 MMOL/L (ref 7–16)
AST SERPL-CCNC: 19 U/L (ref 12–45)
BILIRUB SERPL-MCNC: <0.2 MG/DL (ref 0.1–1.5)
BUN SERPL-MCNC: 13 MG/DL (ref 8–22)
CALCIUM SERPL-MCNC: 8.8 MG/DL (ref 8.5–10.5)
CHLORIDE SERPL-SCNC: 105 MMOL/L (ref 96–112)
CHOLEST SERPL-MCNC: 168 MG/DL (ref 100–199)
CO2 SERPL-SCNC: 21 MMOL/L (ref 20–33)
CREAT SERPL-MCNC: 0.61 MG/DL (ref 0.5–1.4)
CREAT UR-MCNC: 220.41 MG/DL
ERYTHROCYTE [DISTWIDTH] IN BLOOD BY AUTOMATED COUNT: 47.7 FL (ref 35.9–50)
EST. AVERAGE GLUCOSE BLD GHB EST-MCNC: 128 MG/DL
FASTING STATUS PATIENT QL REPORTED: NORMAL
GFR SERPLBLD CREATININE-BSD FMLA CKD-EPI: 121 ML/MIN/1.73 M 2
GLOBULIN SER CALC-MCNC: 2.5 G/DL (ref 1.9–3.5)
GLUCOSE SERPL-MCNC: 102 MG/DL (ref 65–99)
HBA1C MFR BLD: 6.1 % (ref 4–5.6)
HCT VFR BLD AUTO: 43.6 % (ref 37–47)
HDLC SERPL-MCNC: 33 MG/DL
HGB BLD-MCNC: 14.6 G/DL (ref 12–16)
LDLC SERPL CALC-MCNC: 94 MG/DL
MCH RBC QN AUTO: 30.9 PG (ref 27–33)
MCHC RBC AUTO-ENTMCNC: 33.5 G/DL (ref 33.6–35)
MCV RBC AUTO: 92.2 FL (ref 81.4–97.8)
MICROALBUMIN UR-MCNC: 4.1 MG/DL
MICROALBUMIN/CREAT UR: 19 MG/G (ref 0–30)
PLATELET # BLD AUTO: 468 K/UL (ref 164–446)
PMV BLD AUTO: 9.5 FL (ref 9–12.9)
POTASSIUM SERPL-SCNC: 4 MMOL/L (ref 3.6–5.5)
PROT SERPL-MCNC: 6.6 G/DL (ref 6–8.2)
RBC # BLD AUTO: 4.73 M/UL (ref 4.2–5.4)
SODIUM SERPL-SCNC: 138 MMOL/L (ref 135–145)
TRIGL SERPL-MCNC: 207 MG/DL (ref 0–149)
TSH SERPL DL<=0.005 MIU/L-ACNC: 2.93 UIU/ML (ref 0.38–5.33)
WBC # BLD AUTO: 10.5 K/UL (ref 4.8–10.8)

## 2022-07-21 PROCEDURE — 80061 LIPID PANEL: CPT

## 2022-07-21 PROCEDURE — 82043 UR ALBUMIN QUANTITATIVE: CPT

## 2022-07-21 PROCEDURE — 84443 ASSAY THYROID STIM HORMONE: CPT

## 2022-07-21 PROCEDURE — 82306 VITAMIN D 25 HYDROXY: CPT

## 2022-07-21 PROCEDURE — 85027 COMPLETE CBC AUTOMATED: CPT

## 2022-07-21 PROCEDURE — 82570 ASSAY OF URINE CREATININE: CPT

## 2022-07-21 PROCEDURE — 80053 COMPREHEN METABOLIC PANEL: CPT

## 2022-07-21 PROCEDURE — 83036 HEMOGLOBIN GLYCOSYLATED A1C: CPT

## 2022-07-21 PROCEDURE — 36415 COLL VENOUS BLD VENIPUNCTURE: CPT

## 2022-08-03 ENCOUNTER — OFFICE VISIT (OUTPATIENT)
Dept: MEDICAL GROUP | Facility: MEDICAL CENTER | Age: 32
End: 2022-08-03
Payer: COMMERCIAL

## 2022-08-03 VITALS
WEIGHT: 235 LBS | BODY MASS INDEX: 40.12 KG/M2 | SYSTOLIC BLOOD PRESSURE: 100 MMHG | HEART RATE: 64 BPM | DIASTOLIC BLOOD PRESSURE: 60 MMHG | OXYGEN SATURATION: 94 % | HEIGHT: 64 IN

## 2022-08-03 DIAGNOSIS — E11.9 TYPE 2 DIABETES MELLITUS WITHOUT COMPLICATION, WITHOUT LONG-TERM CURRENT USE OF INSULIN (HCC): ICD-10-CM

## 2022-08-03 DIAGNOSIS — E78.2 MIXED HYPERLIPIDEMIA: ICD-10-CM

## 2022-08-03 DIAGNOSIS — J45.40 MODERATE PERSISTENT ASTHMA WITHOUT COMPLICATION: ICD-10-CM

## 2022-08-03 DIAGNOSIS — E66.01 MORBID OBESITY WITH BMI OF 40.0-44.9, ADULT (HCC): ICD-10-CM

## 2022-08-03 DIAGNOSIS — E28.2 PCOS (POLYCYSTIC OVARIAN SYNDROME): ICD-10-CM

## 2022-08-03 PROBLEM — J01.10 ACUTE NON-RECURRENT FRONTAL SINUSITIS: Status: RESOLVED | Noted: 2022-05-20 | Resolved: 2022-08-03

## 2022-08-03 PROBLEM — J45.30 MILD PERSISTENT ASTHMA WITHOUT COMPLICATION: Status: RESOLVED | Noted: 2022-03-03 | Resolved: 2022-08-03

## 2022-08-03 PROCEDURE — 99214 OFFICE O/P EST MOD 30 MIN: CPT | Performed by: FAMILY MEDICINE

## 2022-08-03 RX ORDER — PREDNISONE 10 MG/1
10 TABLET ORAL EVERY EVENING
Qty: 10 TABLET | Refills: 0 | Status: SHIPPED | OUTPATIENT
Start: 2022-08-03 | End: 2022-08-13

## 2022-08-03 RX ORDER — IBUPROFEN 600 MG/1
TABLET ORAL
COMMUNITY
Start: 2022-06-14 | End: 2022-08-03

## 2022-08-03 ASSESSMENT — FIBROSIS 4 INDEX: FIB4 SCORE: 0.27

## 2022-08-03 NOTE — PROGRESS NOTES
Diabetes Focused Exam:    Chief Complaint   Patient presents with   • Diabetes Mellitus      Subjective:   HPI  Minnie Edward is a 32 y.o. female who presents for follow up of chronic conditions of diabetes mellitus.  She did have hyperlipidemia but she has corrected that with diet and exercise. She indicates that she is feeling well and denies any symptoms referable to the above diagnoses. Specifically denies chest pain, palpitations, dyspnea, orthopnea, PND or peripheral edema. Also denies polyuria, polydipsia, urinary complaints, abdominal complaints, myalgias, numbness, weakness or other related symptoms.   He headaches are less.    The patient is not taking ASA, she is taking all other medications as prescribed. Patient denies any side effects of medication other than a little diarrhea after her morning metformin.  Changing to evening as per diabetic nurse educator's suggestion.     Her asthma exacerbation is doing much better but she has a persistent cough.  She will use her nebulizer twice daily.  She will continue the albuterol.  Add prednisone 10 mg nightly for 10 days.    DM: A1c goal <7  Glucose monitoring frequency: not currently, not needed  Hypoglycemic episodes none noted.  Diabetic complications: none  ACR Albumin/Creatinine Ratio goal <30  Blood pressure goal <140/<80. Currently Rx ACE/ARB: Not Indicated  Hyperlipidemia:Cholesterol goal LDL <100, total/HDL <5. Currently Rx Statin: Yes but patient has decided not to.  Was able to correct her lipids on her own.  Last eye exam 12/2021  Denies visual blurring, double vision, eye pain and floaters  Last monofilament foot exam: today with nurse.   Denies foot pain, numbness, calluses, ulcers    See medications and orders placed in encounter report.  Past medical history, family history, social history reviewed and updated as documented in medical record.    Current medications including changes today:  Current Outpatient Medications  "  Medication Sig Dispense Refill   • Budesonide, Inhalation, 180 MCG/ACT AEROSOL POWDER, BREATH ACTIVATED Inhale 1 Puff every day. 1 Each 6   • predniSONE (DELTASONE) 10 MG Tab Take 1 Tablet by mouth every evening for 10 days. 10 Tablet 0   • albuterol (PROVENTIL) 2.5mg/3ml Nebu Soln solution for nebulization Take 3 mL by nebulization every four hours as needed for Shortness of Breath. 540 mL 0   • Vitamin D, Cholecalciferol, 50 MCG (2000 UT) Cap Take 2,000 Units by mouth every day.     • metFORMIN ER (GLUCOPHAGE XR) 500 MG TABLET SR 24 HR Take 1 Tablet by mouth 2 times a day. 180 Tablet 2   • escitalopram (LEXAPRO) 20 MG tablet Take 1 Tablet by mouth every day. 90 Tablet 3   • levothyroxine (SYNTHROID) 50 MCG Tab Take 1 Tablet by mouth every morning on an empty stomach. 90 Tablet 3   • albuterol (PROAIR HFA) 108 (90 Base) MCG/ACT Aero Soln inhalation aerosol Inhale 2 Puffs every 6 hours as needed for Shortness of Breath. 8.5 g 6   • omeprazole (PRILOSEC) 40 MG delayed-release capsule Take 1 Cap by mouth every day. 90 Cap 3   • ibuprofen (MOTRIN) 800 MG Tab Take 1 Tab by mouth every 12 hours as needed for Headache or Inflammation. Take with food 180 Tab 3     No current facility-administered medications for this visit.     Allergies: No Known Allergies   Social History     Tobacco Use   • Smoking status: Never Smoker   • Smokeless tobacco: Never Used   Vaping Use   • Vaping Use: Never used   Substance Use Topics   • Alcohol use: No     Alcohol/week: 0.0 oz   • Drug use: No     Exercise: she is being more active  Health Maintenance/Immunizations: discussed, up to date    ROS  Pertinent  ROS findings as above. Denies fever or chills.  Periods have been lighter.      Objective:     OBJECTIVE:  /60   Pulse 64   Ht 1.626 m (5' 4\")   Wt 107 kg (235 lb)   SpO2 94%   BMI 40.34 kg/m²  Body mass index is 40.34 kg/m². BMI: severely obese, improving  General: No apparent distress, conversant, cooperative and pleasant " with the examination.  Psych: Alert and oriented x4, judgment and insight normal  Neck: No JVD or bruits, no adenopathy, supple  Thyroid: normal to inspection and palpation  Lungs: negative findings: normal respiratory rate and rhythm, lungs clear to auscultation  Heart: negative findings: regular rate and rhythm, S1 normal, S2 normal, no murmurs, clicks, or gallops  Abdomen: Soft, nontender, no hepatosplenomegaly or masses, normal bowel sounds  Skin: No rashes, no cyanosis, no lesions or ulcers  Extremities: No cyanosis clubbing or edema.   Feet are examined by the diabetes nurse today    POC labs: No results found for: POCGLUCOSE       Last labs    Lab Results   Component Value Date/Time    CHOLSTRLTOT 168 07/21/2022 07:23 AM    LDL 94 07/21/2022 07:23 AM    HDL 33 (A) 07/21/2022 07:23 AM    TRIGLYCERIDE 207 (H) 07/21/2022 07:23 AM       Lab Results   Component Value Date/Time    SODIUM 138 07/21/2022 07:23 AM    POTASSIUM 4.0 07/21/2022 07:23 AM    GLUCOSE 102 (H) 07/21/2022 07:23 AM    BUNCREATRAT 16 04/28/2021 07:12 AM    BUNCREATRAT 16 03/24/2011 08:02 AM    GLOMRATE >59 03/24/2011 08:02 AM     Lab Results   Component Value Date/Time    HBA1C 6.1 (H) 07/21/2022 07:23 AM    HBA1C 6.6 (H) 03/01/2022 09:54 AM    HBA1C 6.4 (H) 04/28/2021 07:12 AM    HBA1C 6.2 (H) 11/17/2020 08:01 AM    HBA1C 6.0 (H) 10/30/2019 04:26 AM    HBA1C 6.1 (H) 11/08/2017 07:54 AM     Lab Results   Component Value Date/Time    MALBCRT 19 07/21/2022 07:23 AM    MICROALBUR 4.1 07/21/2022 07:23 AM          Assessment/Plan:   Medications, refills, and referrals per orders.   1. Type 2 diabetes mellitus without complication, without long-term current use of insulin (HCC)  Diabetic Monofilament LE Exam   2. Moderate persistent asthma without complication  Budesonide, Inhalation, 180 MCG/ACT AEROSOL POWDER, BREATH ACTIVATED    predniSONE (DELTASONE) 10 MG Tab     DM2 A1c is at goal   Patient to monitor sugars: not currently necessary.   Discussed  diet, exercise, disease management and weight loss goals.   Education and advise provided today:All medications, side effects and compliance (discussed carefully)  Annual eye examinations at Ophthalmology  Diabetic diet discussed in detail, written exchange diet given  Foot care discussed and Podiatry visits  Labs immediately prior to next visit  Long term diabetic complications  Low cholesterol diet, weight control and daily exercise    Followup:   Do labs and RTC 4 months, sooner should new symptoms or problems arise.

## 2022-08-03 NOTE — PROGRESS NOTES
RN-OSCAR Note    Subjective:     Health changes since last visit/interval Hx:  Feeling good and has made healthy lifestyle changes.    Medications (including changes made today)  Current Outpatient Medications   Medication Sig Dispense Refill   • methylPREDNISolone (MEDROL DOSEPAK) 4 MG Tablet Therapy Pack Per  directions on package 21 Tablet 0   • albuterol (PROVENTIL) 2.5mg/3ml Nebu Soln solution for nebulization Take 3 mL by nebulization every four hours as needed for Shortness of Breath. 540 mL 0   • benzonatate (TESSALON) 100 MG Cap Take 1 Capsule by mouth 2 times a day as needed for Cough. 20 Capsule 0   • polymixin-trimethoprim (POLYTRIM) 45412-4.1 UNIT/ML-% Solution Administer 1 Drop into both eyes 4 times a day. 10 mL 0   • Vitamin D, Cholecalciferol, 50 MCG (2000 UT) Cap Take 2,000 Units by mouth every day.     • metFORMIN ER (GLUCOPHAGE XR) 500 MG TABLET SR 24 HR Take 1 Tablet by mouth 2 times a day. 180 Tablet 2   • escitalopram (LEXAPRO) 20 MG tablet Take 1 Tablet by mouth every day. 90 Tablet 3   • atorvastatin (LIPITOR) 10 MG Tab Take 1 Tablet by mouth every evening. 90 Tablet 3   • levothyroxine (SYNTHROID) 50 MCG Tab Take 1 Tablet by mouth every morning on an empty stomach. 90 Tablet 3   • albuterol (PROAIR HFA) 108 (90 Base) MCG/ACT Aero Soln inhalation aerosol Inhale 2 Puffs every 6 hours as needed for Shortness of Breath. 8.5 g 6   • omeprazole (PRILOSEC) 40 MG delayed-release capsule Take 1 Cap by mouth every day. 90 Cap 3   • ibuprofen (MOTRIN) 800 MG Tab Take 1 Tab by mouth every 12 hours as needed for Headache or Inflammation. Take with food 180 Tab 3     No current facility-administered medications for this visit.       Taking daily ASA: Not Indicated  Taking above medications as prescribed: no She did not start the Lipitor yet  SIDE EFFECTS: Having some diarrhea with Metformin ER after breakfast    Exercise: Walking and active with children swimming  Diet: Eggs with vegetables and  lean ham and toast for breakfast.  Lunch is tuna packets and string cheese.  Dinner is protein, vegetables, and small amount of carbohydrate.  Drinking regular soda every 2 weeks.  Patient's body mass index is unknown because there is no height or weight on file. Exercise and nutrition counseling were performed at this visit.      Health Maintenance:   There are no preventive care reminders to display for this patient.      DM:   Last A1c:   Lab Results   Component Value Date/Time    HBA1C 6.1 (H) 07/21/2022 07:23 AM      A1C GOAL: < 6    Glucose monitoring frequency: Not testing blood sugars    Hypoglycemic episodes: no    Last Retinal Exam: Had at [facility] in December  Daily Foot Exam: Yes   Routine Dental Exams: Yes    Lab Results   Component Value Date/Time    MALBCRT 19 07/21/2022 07:23 AM    MICROALBUR 4.1 07/21/2022 07:23 AM        ACR Albumin/Creatinine Ratio goal <30     HTN:   Blood pressure goal <140/<80 .   Currently Rx ACE/ARB: No    Dyslipidemia:    Lab Results   Component Value Date/Time    CHOLSTRLTOT 168 07/21/2022 07:23 AM    LDL 94 07/21/2022 07:23 AM    HDL 33 (A) 07/21/2022 07:23 AM    TRIGLYCERIDE 207 (H) 07/21/2022 07:23 AM       Lab Results   Component Value Date/Time    SODIUM 138 07/21/2022 07:23 AM    POTASSIUM 4.0 07/21/2022 07:23 AM    CHLORIDE 105 07/21/2022 07:23 AM    CO2 21 07/21/2022 07:23 AM    GLUCOSE 102 (H) 07/21/2022 07:23 AM    BUN 13 07/21/2022 07:23 AM    CREATININE 0.61 07/21/2022 07:23 AM    CREATININE 0.76 03/24/2011 08:02 AM    BUNCREATRAT 16 04/28/2021 07:12 AM    BUNCREATRAT 16 03/24/2011 08:02 AM    GLOMRATE >59 03/24/2011 08:02 AM     Lab Results   Component Value Date/Time    ALKPHOSPHAT 64 07/21/2022 07:23 AM    ASTSGOT 19 07/21/2022 07:23 AM    ALTSGPT 24 07/21/2022 07:23 AM    TBILIRUBIN <0.2 07/21/2022 07:23 AM        Currently Rx Statin: No    She  reports that she has never smoked. She has never used smokeless tobacco.    Objective:      Exam:  Monofilament: done   Monofilament testing with a 10 gram force: sensation intact: intact bilaterally  Visual Inspection: Feet without maceration, ulcers, fissures.  Pedal pulses: intact bilaterally    Plan:     Discussed and educated on:   - All medications, side effects and compliance (discussed carefully)  - Annual eye examinations at Ophthalmology  - Home glucose monitoring emphasized  - Weight control and daily exercise    Recommended medication changes: She will try to move her Metformin  mg to 2 after dinner to see if that helps her diarrhea.  She will continue with her healthy lifestyle.

## 2022-09-09 ENCOUNTER — OFFICE VISIT (OUTPATIENT)
Dept: MEDICAL GROUP | Facility: MEDICAL CENTER | Age: 32
End: 2022-09-09
Payer: COMMERCIAL

## 2022-09-09 VITALS
DIASTOLIC BLOOD PRESSURE: 58 MMHG | SYSTOLIC BLOOD PRESSURE: 114 MMHG | RESPIRATION RATE: 14 BRPM | OXYGEN SATURATION: 98 % | TEMPERATURE: 97.5 F | HEIGHT: 64 IN | WEIGHT: 231.92 LBS | BODY MASS INDEX: 39.6 KG/M2 | HEART RATE: 69 BPM

## 2022-09-09 DIAGNOSIS — J02.9 SORE THROAT: ICD-10-CM

## 2022-09-09 DIAGNOSIS — J01.00 ACUTE NON-RECURRENT MAXILLARY SINUSITIS: ICD-10-CM

## 2022-09-09 DIAGNOSIS — G44.209 TENSION HEADACHE: ICD-10-CM

## 2022-09-09 DIAGNOSIS — Z20.818 EXPOSURE TO STREP THROAT: ICD-10-CM

## 2022-09-09 LAB
INT CON NEG: NEGATIVE
INT CON POS: POSITIVE
S PYO AG THROAT QL: NEGATIVE

## 2022-09-09 PROCEDURE — 87880 STREP A ASSAY W/OPTIC: CPT | Performed by: FAMILY MEDICINE

## 2022-09-09 PROCEDURE — 99213 OFFICE O/P EST LOW 20 MIN: CPT | Performed by: FAMILY MEDICINE

## 2022-09-09 RX ORDER — AMOXICILLIN 875 MG/1
875 TABLET, COATED ORAL 2 TIMES DAILY
Qty: 14 TABLET | Refills: 0 | Status: SHIPPED | OUTPATIENT
Start: 2022-09-09 | End: 2022-09-16

## 2022-09-09 RX ORDER — IBUPROFEN 800 MG/1
TABLET ORAL
Qty: 180 TABLET | Refills: 0 | Status: SHIPPED | OUTPATIENT
Start: 2022-09-09 | End: 2023-05-09

## 2022-09-09 ASSESSMENT — FIBROSIS 4 INDEX: FIB4 SCORE: 0.27

## 2022-09-09 NOTE — PROGRESS NOTES
Chief Complaint   Patient presents with    Pharyngitis     Pt stated her children have tested positive for Strep throat.     Sinus Problem    Headache        HPI: This is a 32 y.o. patient has 3 days of sore throat, cough and congestion, runny nose. bilateral ear fullness. No abnormal shortness of breath. No nausea vomiting or diarrhea. Mild subjective fever and chills. Kids are both strep positive, starter symptoms over a week ago. No coughing up blood. Has  moderate headache.  Patient has taken over-the-counter analgesics and decongestant without relief.     ROS:  No nausea, changes in bowel movements or skin rash.      I reviewed the patient's medications, allergies and medical history:  Current Outpatient Medications   Medication Sig Dispense Refill    amoxicillin (AMOXIL) 875 MG tablet Take 1 Tablet by mouth 2 times a day for 7 days. 14 Tablet 0    Budesonide, Inhalation, 180 MCG/ACT AEROSOL POWDER, BREATH ACTIVATED Inhale 1 Puff every day. 1 Each 6    albuterol (PROVENTIL) 2.5mg/3ml Nebu Soln solution for nebulization Take 3 mL by nebulization every four hours as needed for Shortness of Breath. 540 mL 0    Vitamin D, Cholecalciferol, 50 MCG (2000 UT) Cap Take 2,000 Units by mouth every day.      metFORMIN ER (GLUCOPHAGE XR) 500 MG TABLET SR 24 HR Take 1 Tablet by mouth 2 times a day. 180 Tablet 2    escitalopram (LEXAPRO) 20 MG tablet Take 1 Tablet by mouth every day. 90 Tablet 3    levothyroxine (SYNTHROID) 50 MCG Tab Take 1 Tablet by mouth every morning on an empty stomach. 90 Tablet 3    albuterol (PROAIR HFA) 108 (90 Base) MCG/ACT Aero Soln inhalation aerosol Inhale 2 Puffs every 6 hours as needed for Shortness of Breath. 8.5 g 6    omeprazole (PRILOSEC) 40 MG delayed-release capsule Take 1 Cap by mouth every day. 90 Cap 3    ibuprofen (MOTRIN) 800 MG Tab Take 1 Tab by mouth every 12 hours as needed for Headache or Inflammation. Take with food 180 Tab 3     No current facility-administered medications for  "this visit.     Patient has no known allergies.  Past Medical History:   Diagnosis Date    Asthma     Elevated glycohemoglobin     Frequent headaches     History of 2019 novel coronavirus disease (COVID-19) 3/3/2022    10/2021    History of chickenpox 2011    developed this at 21    Impaired glucose tolerance     Morbid obesity with BMI of 40.0-44.9, adult ([facility]) 3/3/2022    PCOS (polycystic ovarian syndrome) 12/15/2009        EXAM:  /58 (BP Location: Right arm, Patient Position: Sitting, BP Cuff Size: Large adult)   Pulse 69   Temp 36.4 °C (97.5 °F) (Temporal)   Resp 14   Ht 1.626 m (5' 4\")   Wt 105 kg (231 lb 14.8 oz)   SpO2 98%   General: NAD, non-toxic appearance.  Eyes: PERRL, conjunctiva slightly injected, no photophobia or eye discharge.  Ears: Normal pinnae.   Nares: Patent with thin, clear mucus.  Sinuses: she is tender over maxillary sinuses.  Throat: Erythematous injection with no enlarged tonsils. no exudates.   Neck: Supple, with shotty anterior cervical lymphadenopathy.  Lungs: Good air entry bilaterally, clear to auscultation. No wheeze, rhonchi or crackles. Normal respiratory effort.  Skin: Warm and dry. No rash.     Results for orders placed or performed in visit on 09/09/22   POCT Rapid Strep A   Result Value Ref Range    Rapid Strep Screen NEGATIVE     Internal Control Positive Positive     Internal Control Negative Negative     Reviewed and discussed     ASSESSMENT:   1. Sore throat    2. Acute non-recurrent maxillary sinusitis    3. Exposure to strep throat          PLAN:  1. Discussed benign nature of viral upper respiratory infections.  Negative strep test in office, treat with penicillin due to positive household tests and consistent symptoms  2. OTC anti-pyretics and decongestants as needed. Supportive care advised.  3. Follow-up in office or urgent care for worsening symptoms, difficulty breathing, lack of expected recovery, or should new symptoms or problems arise.    "

## 2022-10-18 ENCOUNTER — OFFICE VISIT (OUTPATIENT)
Dept: MEDICAL GROUP | Facility: MEDICAL CENTER | Age: 32
End: 2022-10-18
Payer: COMMERCIAL

## 2022-10-18 VITALS
DIASTOLIC BLOOD PRESSURE: 64 MMHG | HEART RATE: 72 BPM | OXYGEN SATURATION: 96 % | HEIGHT: 64 IN | WEIGHT: 243.2 LBS | BODY MASS INDEX: 41.52 KG/M2 | SYSTOLIC BLOOD PRESSURE: 112 MMHG | TEMPERATURE: 98.7 F

## 2022-10-18 DIAGNOSIS — N81.89 PELVIC FLOOR WEAKNESS IN FEMALE: ICD-10-CM

## 2022-10-18 DIAGNOSIS — J45.30 MILD PERSISTENT ASTHMA WITHOUT COMPLICATION: ICD-10-CM

## 2022-10-18 DIAGNOSIS — Z23 NEED FOR PNEUMOCOCCAL VACCINATION: ICD-10-CM

## 2022-10-18 DIAGNOSIS — Z23 NEED FOR IMMUNIZATION AGAINST INFLUENZA: ICD-10-CM

## 2022-10-18 DIAGNOSIS — J45.998 POST VIRAL ASTHMA: ICD-10-CM

## 2022-10-18 PROCEDURE — 90472 IMMUNIZATION ADMIN EACH ADD: CPT | Performed by: FAMILY MEDICINE

## 2022-10-18 PROCEDURE — 90677 PCV20 VACCINE IM: CPT | Performed by: FAMILY MEDICINE

## 2022-10-18 PROCEDURE — 90471 IMMUNIZATION ADMIN: CPT | Performed by: FAMILY MEDICINE

## 2022-10-18 PROCEDURE — 99213 OFFICE O/P EST LOW 20 MIN: CPT | Mod: 25 | Performed by: FAMILY MEDICINE

## 2022-10-18 PROCEDURE — 90686 IIV4 VACC NO PRSV 0.5 ML IM: CPT | Performed by: FAMILY MEDICINE

## 2022-10-18 RX ORDER — BENZONATATE 100 MG/1
100 CAPSULE ORAL 3 TIMES DAILY PRN
Qty: 60 CAPSULE | Refills: 0 | Status: SHIPPED | OUTPATIENT
Start: 2022-10-18 | End: 2022-12-21

## 2022-10-18 RX ORDER — PREDNISONE 10 MG/1
10 TABLET ORAL 2 TIMES DAILY WITH MEALS
Qty: 10 TABLET | Refills: 0 | Status: SHIPPED | OUTPATIENT
Start: 2022-10-18 | End: 2022-10-23

## 2022-10-18 RX ORDER — ALBUTEROL SULFATE 90 UG/1
2 AEROSOL, METERED RESPIRATORY (INHALATION) EVERY 6 HOURS PRN
Qty: 8.5 G | Refills: 6 | Status: SHIPPED | OUTPATIENT
Start: 2022-10-18 | End: 2023-09-25 | Stop reason: SDUPTHER

## 2022-10-18 ASSESSMENT — FIBROSIS 4 INDEX: FIB4 SCORE: 0.27

## 2022-10-18 NOTE — PROGRESS NOTES
Chief Complaint   Patient presents with    Cough     Pt c/o dry cough that has returned x1.5w ago. Pt states she was recently ill, but the cough started to subside. Sx have failed to improve since with prescribed rx since the return of the cough.     Chest Pressure     Pt states her chest has felt tight since the start of this illness.       Subjective:     HPI:   Minnie Edward presents today with the followin. Post viral asthma  Patient has a cold that began around 2 weeks ago.  She felt she was getting better after few days and that her cough has escalated.  The cough is a little worse at nighttime but is persistent throughout the day as well.  She was on amoxicillin in September.  Patient is having more cough and shortness of breath.  I believe she is in acute exacerbation.  She does have shortness of breath and chest pressure that do improve with the use of her albuterol inhaler.  Her inhaler is renewed but I will also have her do a course of prednisone.  I will also prescribe benzonatate Perles as she feels they were mildly helpful in the past.  Discussed going to ER if her symptoms worsen and not hesitating to call 911.    2. Mild persistent asthma without complication  Patient does have mild persistent asthma.  The albuterol is renewed.  I do not believe she has an exacerbation as discussed above.  See prescribed medications.  She will let me know via [facility] if she is not improving within 48 hours.    3. Need for immunization against influenza  Quad preservative free flu vaccine administered today    4. Need for pneumococcal vaccination  Pneumococcal vaccine administered today    5. Pelvic floor weakness in female  Is having urinary leakage all the time.  Denies dysuria.  It is stress incontinence.  PT referral discussed and placed.        Patient Active Problem List    Diagnosis Date Noted    Post viral asthma 10/18/2022    Moderate persistent asthma without complication 2022     Type 2 diabetes mellitus without complication, without long-term current use of insulin ([facility]) 03/03/2022    Morbid obesity with BMI of 40.0-44.9, adult (HCC) 03/03/2022    Mixed hyperlipidemia 03/03/2022    History of 2019 novel coronavirus disease (COVID-19) 03/03/2022    Situational anxiety 05/03/2019    Menometrorrhagia 05/03/2019    Vitamin D deficiency 09/21/2017    Thyromegaly 12/20/2016    Tension headache 12/20/2016    Left-sided low back pain with left-sided sciatica 10/02/2015    Seasonal allergies 09/26/2014    PCOS (Polycystic Ovarian Syndrome) 12/15/2009       Current medicines (including changes today)  Current Outpatient Medications   Medication Sig Dispense Refill    benzonatate (TESSALON) 100 MG Cap Take 1 Capsule by mouth 3 times a day as needed for Cough. 60 Capsule 0    predniSONE (DELTASONE) 10 MG Tab Take 1 Tablet by mouth 2 times a day with meals for 5 days. 10 Tablet 0    albuterol (PROAIR HFA) 108 (90 Base) MCG/ACT Aero Soln inhalation aerosol Inhale 2 Puffs every 6 hours as needed for Shortness of Breath. 8.5 g 6    ibuprofen (MOTRIN) 800 MG Tab TAKE 1 TABLET BY MOUTH EVERY 12 HOURS AS NEEDED FOR HEADACHE OR  INFLAMMATION  (TAKE  WITH  FOOD) 180 Tablet 0    Budesonide, Inhalation, 180 MCG/ACT AEROSOL POWDER, BREATH ACTIVATED Inhale 1 Puff every day. 1 Each 6    albuterol (PROVENTIL) 2.5mg/3ml Nebu Soln solution for nebulization Take 3 mL by nebulization every four hours as needed for Shortness of Breath. 540 mL 0    Vitamin D, Cholecalciferol, 50 MCG (2000 UT) Cap Take 2,000 Units by mouth every day.      metFORMIN ER (GLUCOPHAGE XR) 500 MG TABLET SR 24 HR Take 1 Tablet by mouth 2 times a day. 180 Tablet 2    escitalopram (LEXAPRO) 20 MG tablet Take 1 Tablet by mouth every day. 90 Tablet 3    levothyroxine (SYNTHROID) 50 MCG Tab Take 1 Tablet by mouth every morning on an empty stomach. 90 Tablet 3    omeprazole (PRILOSEC) 40 MG delayed-release capsule Take 1 Cap by mouth every day. 90  "Cap 3     No current facility-administered medications for this visit.       No Known Allergies    ROS: As per HPI       Objective:     /64 (BP Location: Right arm, Patient Position: Sitting, BP Cuff Size: Large adult)   Pulse 72   Temp 37.1 °C (98.7 °F) (Temporal)   Ht 1.626 m (5' 4\")   Wt 110 kg (243 lb 3.2 oz)   SpO2 96%  Body mass index is 41.75 kg/m².    Physical Exam:  Constitutional: Well-developed and well-nourished. Not diaphoretic. No distress. Lucid and fluent.  Patient, physician and staff all wearing masks.  Patient mask removed briefly for examination.  Skin: Skin is warm and dry. No rash noted.  Head: Atraumatic without lesions.  Eyes: Conjunctivae and extraocular motions are normal. Pupils are equal, round, and reactive to light. No scleral icterus.   Ears:  External ears unremarkable.   Nose: Nares patent. Mucosa without edema or erythema. No discharge. No facial tenderness.  Mouth/Throat: Tongue normal. Oropharynx is clear and moist. Posterior pharynx without erythema or exudates.  Some increased posterior pharyngeal vascularity but this is minimal.  Neck: Supple, trachea midline. No thyromegaly present. No cervical or supraclavicular lymphadenopathy. No JVD or carotid bruits appreciated  Cardiovascular: Regular rate and rhythm.  Normal S1, S2 without murmur appreciated.  Chest: Effort slightly increased.  Some scattered rhonchi and expiratory wheezes.  Air movement is moderate, somewhat decreased.  Extremities: No cyanosis, clubbing, erythema, nor edema.   Neurological: Alert and oriented x 3.  No tremor appreciated.  Psychiatric:  Behavior, mood, and affect are appropriate.       Assessment and Plan:     32 y.o. female with the following issues:    1. Post viral asthma  benzonatate (TESSALON) 100 MG Cap    predniSONE (DELTASONE) 10 MG Tab      2. Mild persistent asthma without complication  albuterol (PROAIR HFA) 108 (90 Base) MCG/ACT Aero Soln inhalation aerosol      3. Need for " immunization against influenza  INFLUENZA VACCINE QUAD INJ (PF)      4. Need for pneumococcal vaccination  Pneumococcal Conjugate Vaccine 20-Valent (19 yrs+)      5. Pelvic floor weakness in female  Referral to Physical Therapy            Followup: Return in about 7 weeks (around 12/7/2022), or if symptoms worsen or fail to improve.

## 2022-12-14 ENCOUNTER — HOSPITAL ENCOUNTER (OUTPATIENT)
Dept: LAB | Facility: MEDICAL CENTER | Age: 32
End: 2022-12-14
Attending: FAMILY MEDICINE
Payer: COMMERCIAL

## 2022-12-14 DIAGNOSIS — E28.2 PCOS (POLYCYSTIC OVARIAN SYNDROME): ICD-10-CM

## 2022-12-14 DIAGNOSIS — E11.9 TYPE 2 DIABETES MELLITUS WITHOUT COMPLICATION, WITHOUT LONG-TERM CURRENT USE OF INSULIN (HCC): ICD-10-CM

## 2022-12-14 DIAGNOSIS — E78.2 MIXED HYPERLIPIDEMIA: ICD-10-CM

## 2022-12-14 DIAGNOSIS — J45.40 MODERATE PERSISTENT ASTHMA WITHOUT COMPLICATION: ICD-10-CM

## 2022-12-14 LAB
ALBUMIN SERPL BCP-MCNC: 4.6 G/DL (ref 3.2–4.9)
ALBUMIN/GLOB SERPL: 1.7 G/DL
ALP SERPL-CCNC: 60 U/L (ref 30–99)
ALT SERPL-CCNC: 23 U/L (ref 2–50)
ANION GAP SERPL CALC-SCNC: 11 MMOL/L (ref 7–16)
AST SERPL-CCNC: 13 U/L (ref 12–45)
BILIRUB SERPL-MCNC: 0.2 MG/DL (ref 0.1–1.5)
BUN SERPL-MCNC: 15 MG/DL (ref 8–22)
CALCIUM ALBUM COR SERPL-MCNC: 8.9 MG/DL (ref 8.5–10.5)
CALCIUM SERPL-MCNC: 9.4 MG/DL (ref 8.5–10.5)
CHLORIDE SERPL-SCNC: 105 MMOL/L (ref 96–112)
CHOLEST SERPL-MCNC: 219 MG/DL (ref 100–199)
CO2 SERPL-SCNC: 24 MMOL/L (ref 20–33)
CREAT SERPL-MCNC: 0.66 MG/DL (ref 0.5–1.4)
ERYTHROCYTE [DISTWIDTH] IN BLOOD BY AUTOMATED COUNT: 48.6 FL (ref 35.9–50)
EST. AVERAGE GLUCOSE BLD GHB EST-MCNC: 134 MG/DL
FASTING STATUS PATIENT QL REPORTED: NORMAL
GFR SERPLBLD CREATININE-BSD FMLA CKD-EPI: 118 ML/MIN/1.73 M 2
GLOBULIN SER CALC-MCNC: 2.7 G/DL (ref 1.9–3.5)
GLUCOSE SERPL-MCNC: 89 MG/DL (ref 65–99)
HBA1C MFR BLD: 6.3 % (ref 4–5.6)
HCT VFR BLD AUTO: 44.3 % (ref 37–47)
HDLC SERPL-MCNC: 32 MG/DL
HGB BLD-MCNC: 14.5 G/DL (ref 12–16)
LDLC SERPL CALC-MCNC: 150 MG/DL
MCH RBC QN AUTO: 31 PG (ref 27–33)
MCHC RBC AUTO-ENTMCNC: 32.7 G/DL (ref 33.6–35)
MCV RBC AUTO: 94.7 FL (ref 81.4–97.8)
PLATELET # BLD AUTO: 464 K/UL (ref 164–446)
PMV BLD AUTO: 9.7 FL (ref 9–12.9)
POTASSIUM SERPL-SCNC: 4.4 MMOL/L (ref 3.6–5.5)
PROT SERPL-MCNC: 7.3 G/DL (ref 6–8.2)
RBC # BLD AUTO: 4.68 M/UL (ref 4.2–5.4)
SODIUM SERPL-SCNC: 140 MMOL/L (ref 135–145)
TRIGL SERPL-MCNC: 186 MG/DL (ref 0–149)
WBC # BLD AUTO: 8.6 K/UL (ref 4.8–10.8)

## 2022-12-14 PROCEDURE — 80061 LIPID PANEL: CPT

## 2022-12-14 PROCEDURE — 83036 HEMOGLOBIN GLYCOSYLATED A1C: CPT

## 2022-12-14 PROCEDURE — 80053 COMPREHEN METABOLIC PANEL: CPT

## 2022-12-14 PROCEDURE — 36415 COLL VENOUS BLD VENIPUNCTURE: CPT

## 2022-12-14 PROCEDURE — 85027 COMPLETE CBC AUTOMATED: CPT

## 2022-12-21 ENCOUNTER — OFFICE VISIT (OUTPATIENT)
Dept: MEDICAL GROUP | Facility: MEDICAL CENTER | Age: 32
End: 2022-12-21
Payer: COMMERCIAL

## 2022-12-21 VITALS
WEIGHT: 239 LBS | HEIGHT: 64 IN | HEART RATE: 70 BPM | BODY MASS INDEX: 40.8 KG/M2 | RESPIRATION RATE: 14 BRPM | DIASTOLIC BLOOD PRESSURE: 70 MMHG | OXYGEN SATURATION: 96 % | SYSTOLIC BLOOD PRESSURE: 106 MMHG

## 2022-12-21 DIAGNOSIS — E78.2 MIXED HYPERLIPIDEMIA: ICD-10-CM

## 2022-12-21 DIAGNOSIS — E28.2 PCOS (POLYCYSTIC OVARIAN SYNDROME): Chronic | ICD-10-CM

## 2022-12-21 DIAGNOSIS — E66.01 MORBID OBESITY WITH BMI OF 40.0-44.9, ADULT (HCC): ICD-10-CM

## 2022-12-21 DIAGNOSIS — E11.9 TYPE 2 DIABETES MELLITUS WITHOUT COMPLICATION, WITHOUT LONG-TERM CURRENT USE OF INSULIN (HCC): ICD-10-CM

## 2022-12-21 PROBLEM — Z86.16 HISTORY OF 2019 NOVEL CORONAVIRUS DISEASE (COVID-19): Status: RESOLVED | Noted: 2022-03-03 | Resolved: 2022-12-21

## 2022-12-21 PROCEDURE — 99214 OFFICE O/P EST MOD 30 MIN: CPT | Performed by: FAMILY MEDICINE

## 2022-12-21 RX ORDER — METFORMIN HYDROCHLORIDE 500 MG/1
500 TABLET, EXTENDED RELEASE ORAL 2 TIMES DAILY
Qty: 180 TABLET | Refills: 2 | Status: SHIPPED | OUTPATIENT
Start: 2022-12-21 | End: 2023-06-29 | Stop reason: SDUPTHER

## 2022-12-21 RX ORDER — ATORVASTATIN CALCIUM 10 MG/1
10 TABLET, FILM COATED ORAL NIGHTLY
Qty: 90 TABLET | Refills: 2 | Status: SHIPPED | OUTPATIENT
Start: 2022-12-21 | End: 2023-06-29 | Stop reason: SDUPTHER

## 2022-12-21 ASSESSMENT — FIBROSIS 4 INDEX: FIB4 SCORE: 0.19

## 2022-12-21 NOTE — PROGRESS NOTES
Diabetes Focused Exam:    Chief Complaint   Patient presents with    Diabetes    Dyslipidemia      Subjective:   HPI  Minnie Edward is a 32 y.o. female who presents for follow up of chronic conditions of diabetes mellitus and hyperlipidemia. She indicates that she is feeling well and denies any symptoms referable to the above diagnoses. Specifically denies chest pain, palpitations, dyspnea, orthopnea, PND or peripheral edema. Also denies polyuria, polydipsia, urinary complaints, abdominal complaints, myalgias, numbness, weakness or other related symptoms.     Please see diabetic RN evaluation.  She completed her monofilament examination.  Patient doing well.    The patient is not taking ASA every day.  She is taking all other medications as prescribed. Patient denies any side effects of medication.  DM: A1c goal <7  Glucose monitoring frequency: intermittently as needed.  Was dispensed a machine today  Hypoglycemic episodes none noted  Diabetic complications: none  ACR Albumin/Creatinine Ratio goal <30   Does not have HTN: Blood pressure goal <140/<80. Currently Rx ACE/ARB: Not Indicated  Hyperlipidemia:Cholesterol goal LDL <100, total/HDL <5. Currently Rx Statin: No  statin begun today  Last eye exam due, has appointment next week.  Denies visual blurring, double vision, eye pain and floaters  Last monofilament foot exam: today by diabetic RN   Denies foot pain, numbness, calluses, ulcers    See medications and orders placed in encounter report.  Past medical history, family history, social history reviewed and updated as documented in medical record.    Current medications including changes today:  Current Outpatient Medications   Medication Sig Dispense Refill    atorvastatin (LIPITOR) 10 MG Tab Take 1 Tablet by mouth every evening. 90 Tablet 2    metFORMIN ER (GLUCOPHAGE XR) 500 MG TABLET SR 24 HR Take 1 Tablet by mouth 2 times a day. 180 Tablet 2    albuterol (PROAIR HFA) 108 (90 Base) MCG/ACT  "Aero Soln inhalation aerosol Inhale 2 Puffs every 6 hours as needed for Shortness of Breath. 8.5 g 6    ibuprofen (MOTRIN) 800 MG Tab TAKE 1 TABLET BY MOUTH EVERY 12 HOURS AS NEEDED FOR HEADACHE OR  INFLAMMATION  (TAKE  WITH  FOOD) 180 Tablet 0    Budesonide, Inhalation, 180 MCG/ACT AEROSOL POWDER, BREATH ACTIVATED Inhale 1 Puff every day. 1 Each 6    albuterol (PROVENTIL) 2.5mg/3ml Nebu Soln solution for nebulization Take 3 mL by nebulization every four hours as needed for Shortness of Breath. 540 mL 0    Vitamin D, Cholecalciferol, 50 MCG (2000 UT) Cap Take 2,000 Units by mouth every day.      escitalopram (LEXAPRO) 20 MG tablet Take 1 Tablet by mouth every day. 90 Tablet 3    levothyroxine (SYNTHROID) 50 MCG Tab Take 1 Tablet by mouth every morning on an empty stomach. 90 Tablet 3    omeprazole (PRILOSEC) 40 MG delayed-release capsule Take 1 Cap by mouth every day. 90 Cap 3     No current facility-administered medications for this visit.     Allergies: No Known Allergies   Social History     Tobacco Use    Smoking status: Never    Smokeless tobacco: Never   Vaping Use    Vaping Use: Never used   Substance Use Topics    Alcohol use: No     Alcohol/week: 0.0 oz    Drug use: No     Exercise: she is being more active, working hard at this  Health Maintenance/Immunizations: discussed, will get COVID second booster in a month or so    ROS  Pertinent  ROS findings as above. Denies chest pain, wheezing or shortness of breath.     Objective:     OBJECTIVE:  /70   Pulse 70   Resp 14   Ht 1.626 m (5' 4\")   Wt 108 kg (239 lb)   SpO2 96%   BMI 41.02 kg/m²  Body mass index is 41.02 kg/m². BMI: severely obese  General: No apparent distress, conversant, cooperative and pleasant with the examination.  Psych: Alert and oriented x4, judgment and insight normal  Neck: No JVD or bruits, no adenopathy, supple  Thyroid: no mass palpated, generous in size  Lungs: negative findings: normal respiratory rate and rhythm, lungs " clear to auscultation  Heart: negative findings: regular rate and rhythm, S1 normal, S2 normal, no murmurs, clicks, or gallops  Abdomen: Soft, nontender, no hepatosplenomegaly or masses, normal bowel sounds  Skin: No rashes, no cyanosis, no lesions or ulcers  Extremities: No cyanosis clubbing or edema.   Feet are examined by diabetic RN    POC labs: No results found for: POCGLUCOSE       Last labs    Lab Results   Component Value Date/Time    CHOLSTRLTOT 219 (H) 12/14/2022 09:44 AM     (H) 12/14/2022 09:44 AM    HDL 32 (A) 12/14/2022 09:44 AM    TRIGLYCERIDE 186 (H) 12/14/2022 09:44 AM       Lab Results   Component Value Date/Time    SODIUM 140 12/14/2022 09:44 AM    POTASSIUM 4.4 12/14/2022 09:44 AM    GLUCOSE 89 12/14/2022 09:44 AM    BUNCREATRAT 15.7 03/07/2022 09:57 AM    BUNCREATRAT 16 04/28/2021 07:12 AM    BUNCREATRAT 16 03/24/2011 08:02 AM    GLOMRATE >59 03/24/2011 08:02 AM     Lab Results   Component Value Date/Time    HBA1C 6.3 (H) 12/14/2022 09:44 AM    HBA1C 6.1 (H) 07/21/2022 07:23 AM    HBA1C 6.6 (H) 03/01/2022 09:54 AM     Lab Results   Component Value Date/Time    MALBCRT 19 07/21/2022 07:23 AM    MICROALBUR 4.1 07/21/2022 07:23 AM          Assessment/Plan:   Medications, refills, and referrals per orders.   1. Type 2 diabetes mellitus without complication, without long-term current use of insulin (HCC)  Diabetic Monofilament LE Exam    HEMOGLOBIN A1C    Comp Metabolic Panel    Lipid Profile    CBC WITHOUT DIFFERENTIAL    metFORMIN ER (GLUCOPHAGE XR) 500 MG TABLET SR 24 HR      2. Mixed hyperlipidemia  atorvastatin (LIPITOR) 10 MG Tab    Comp Metabolic Panel    Lipid Profile      3. PCOS (polycystic ovarian syndrome)  Comp Metabolic Panel    CBC WITHOUT DIFFERENTIAL    metFORMIN ER (GLUCOPHAGE XR) 500 MG TABLET SR 24 HR      4. Morbid obesity with BMI of 40.0-44.9, adult (HCC)          DM2 A1c is at goal   Patient to monitor sugars: when she feels poorly or different frequency  Discussed  diet, exercise, disease management and weight loss goals.   Education and advise provided today:All medications, side effects and compliance (discussed carefully)  Annual eye examinations at Ophthalmology  Diabetic diet discussed in detail, written exchange diet given  Foot care discussed and Podiatry visits  Glycohemoglobin and other lab monitoring  Labs immediately prior to next visit  Long term diabetic complications  Low cholesterol diet, weight control and daily exercise    Followup:   Do labs and RTC 6 months, sooner should new symptoms or problems arise.

## 2022-12-21 NOTE — PROGRESS NOTES
RN-OSCAR Note    Subjective:     Health changes since last visit/interval Hx: Health good.   Concerned about her cholesterol.      Medications (including changes made today)  Current Outpatient Medications   Medication Sig Dispense Refill    benzonatate (TESSALON) 100 MG Cap Take 1 Capsule by mouth 3 times a day as needed for Cough. 60 Capsule 0    albuterol (PROAIR HFA) 108 (90 Base) MCG/ACT Aero Soln inhalation aerosol Inhale 2 Puffs every 6 hours as needed for Shortness of Breath. 8.5 g 6    ibuprofen (MOTRIN) 800 MG Tab TAKE 1 TABLET BY MOUTH EVERY 12 HOURS AS NEEDED FOR HEADACHE OR  INFLAMMATION  (TAKE  WITH  FOOD) 180 Tablet 0    Budesonide, Inhalation, 180 MCG/ACT AEROSOL POWDER, BREATH ACTIVATED Inhale 1 Puff every day. 1 Each 6    albuterol (PROVENTIL) 2.5mg/3ml Nebu Soln solution for nebulization Take 3 mL by nebulization every four hours as needed for Shortness of Breath. 540 mL 0    Vitamin D, Cholecalciferol, 50 MCG (2000 UT) Cap Take 2,000 Units by mouth every day.      metFORMIN ER (GLUCOPHAGE XR) 500 MG TABLET SR 24 HR Take 1 Tablet by mouth 2 times a day. 180 Tablet 2    escitalopram (LEXAPRO) 20 MG tablet Take 1 Tablet by mouth every day. 90 Tablet 3    levothyroxine (SYNTHROID) 50 MCG Tab Take 1 Tablet by mouth every morning on an empty stomach. 90 Tablet 3    omeprazole (PRILOSEC) 40 MG delayed-release capsule Take 1 Cap by mouth every day. 90 Cap 3     No current facility-administered medications for this visit.       Taking daily ASA: No  Taking above medications as prescribed: yes  SIDE EFFECTS: Patient denies side effects to medications    Exercise:  3 times/week but not this month with being busy at work and child being sick.  Diet: eating healthy with lower carbohydrates.  Patient's body mass index is unknown because there is no height or weight on file. Exercise and nutrition counseling were performed at this visit.      Health Maintenance:   Health Maintenance Due   Topic  Date Due    HEPATITIS C SCREENING  Never done    COVID-19 Vaccine (4 - Booster for Pfizer series) 01/27/2022    RETINAL SCREENING  12/21/2022         DM:   Last A1c:   Lab Results   Component Value Date/Time    HBA1C 6.3 (H) 12/14/2022 09:44 AM      A1C GOAL: < 7    Glucose monitoring frequency: She is not testing but interested in a meter to check randomly    Hypoglycemic episodes: no    Last Retinal Exam:  Has it scheduled for next week [facility]  Daily Foot Exam: Yes   Routine Dental Exams: Yes    Lab Results   Component Value Date/Time    MALBCRT 19 07/21/2022 07:23 AM    MICROALBUR 4.1 07/21/2022 07:23 AM        ACR Albumin/Creatinine Ratio goal <30     HTN:   Blood pressure goal <140/<80 . Rx ACE/ARB: Yes    Dyslipidemia:    Lab Results   Component Value Date/Time    CHOLSTRLTOT 219 (H) 12/14/2022 09:44 AM     (H) 12/14/2022 09:44 AM    HDL 32 (A) 12/14/2022 09:44 AM    TRIGLYCERIDE 186 (H) 12/14/2022 09:44 AM       Lab Results   Component Value Date/Time    SODIUM 140 12/14/2022 09:44 AM    POTASSIUM 4.4 12/14/2022 09:44 AM    CHLORIDE 105 12/14/2022 09:44 AM    CO2 24 12/14/2022 09:44 AM    GLUCOSE 89 12/14/2022 09:44 AM    BUN 15 12/14/2022 09:44 AM    CREATININE 0.66 12/14/2022 09:44 AM    CREATININE 0.76 03/24/2011 08:02 AM    BUNCREATRAT 15.7 03/07/2022 09:57 AM    BUNCREATRAT 16 04/28/2021 07:12 AM    BUNCREATRAT 16 03/24/2011 08:02 AM    GLOMRATE >59 03/24/2011 08:02 AM     Lab Results   Component Value Date/Time    ALKPHOSPHAT 60 12/14/2022 09:44 AM    ASTSGOT 13 12/14/2022 09:44 AM    ALTSGPT 23 12/14/2022 09:44 AM    TBILIRUBIN 0.2 12/14/2022 09:44 AM        Currently Rx Statin: No    She  reports that she has never smoked. She has never used smokeless tobacco.    Objective:     Exam:  Monofilament: done   Monofilament testing with a 10 gram force: sensation intact: intact bilaterally  Visual Inspection: Feet without maceration, ulcers, fissures.  Pedal pulses: intact  bilaterally    Plan:     Discussed and educated on:   - All medications, side effects and compliance (discussed carefully)  - Annual eye examinations at Ophthalmology  - Home glucose monitoring emphasized  - Weight control and daily exercise    Recommended medication changes: Contour Next meter supplied and she will test if feels different.  She can get the "[employer] Relion meter and test strips

## 2023-02-22 DIAGNOSIS — N81.89 PELVIC FLOOR WEAKNESS IN FEMALE: ICD-10-CM

## 2023-04-28 ENCOUNTER — OFFICE VISIT (OUTPATIENT)
Dept: MEDICAL GROUP | Facility: MEDICAL CENTER | Age: 33
End: 2023-04-28
Payer: COMMERCIAL

## 2023-04-28 VITALS
HEART RATE: 66 BPM | DIASTOLIC BLOOD PRESSURE: 64 MMHG | SYSTOLIC BLOOD PRESSURE: 110 MMHG | OXYGEN SATURATION: 96 % | HEIGHT: 64 IN | BODY MASS INDEX: 41.93 KG/M2 | TEMPERATURE: 97.6 F | WEIGHT: 245.6 LBS

## 2023-04-28 DIAGNOSIS — J01.10 ACUTE FRONTAL SINUSITIS, RECURRENCE NOT SPECIFIED: ICD-10-CM

## 2023-04-28 DIAGNOSIS — J45.40 MODERATE PERSISTENT ASTHMA WITHOUT COMPLICATION: ICD-10-CM

## 2023-04-28 DIAGNOSIS — J30.1 SEASONAL ALLERGIC RHINITIS DUE TO POLLEN: ICD-10-CM

## 2023-04-28 PROCEDURE — 99214 OFFICE O/P EST MOD 30 MIN: CPT | Performed by: STUDENT IN AN ORGANIZED HEALTH CARE EDUCATION/TRAINING PROGRAM

## 2023-04-28 ASSESSMENT — FIBROSIS 4 INDEX: FIB4 SCORE: 0.2

## 2023-04-28 ASSESSMENT — PATIENT HEALTH QUESTIONNAIRE - PHQ9: CLINICAL INTERPRETATION OF PHQ2 SCORE: 0

## 2023-04-28 NOTE — LETTER
April 28, 2023    To Whom It May Concern:         This is confirmation that Minnie Meme Barrosradha Edward attended her scheduled appointment with Oren Fontenot M.D. on 4/28/23.         If you have any questions please do not hesitate to call me at the phone number listed below.    Sincerely,          Oren Fontenot M.D.  [phone]

## 2023-04-28 NOTE — PROGRESS NOTES
Chief Complaint   Patient presents with    Sinus Problem     Sinus pressure X4d     Headache     X4d     Pharyngitis     X4d         HPI: Patient is a 33 y.o. female complaining of 3  days of illness including: ear pain, facial pain, nasal congestion, rhinorrhea.   Mucus is: clear.  Similarly ill exposures: yes. No sick exposure  Treatments tried:   - Allegra and flonase has been on since 03/2023  - neti- pot  - ear pruitis, eye pruitis    Hx of asthma  Hx of allergies      She  reports that she has never smoked. She has never used smokeless tobacco..     ROS:  No fever, cough, nausea, changes in bowel movements or skin rash.      I reviewed the patient's medications, allergies and medical history:  Current Outpatient Medications   Medication Sig Dispense Refill    atorvastatin (LIPITOR) 10 MG Tab Take 1 Tablet by mouth every evening. 90 Tablet 2    metFORMIN ER (GLUCOPHAGE XR) 500 MG TABLET SR 24 HR Take 1 Tablet by mouth 2 times a day. 180 Tablet 2    albuterol (PROAIR HFA) 108 (90 Base) MCG/ACT Aero Soln inhalation aerosol Inhale 2 Puffs every 6 hours as needed for Shortness of Breath. 8.5 g 6    ibuprofen (MOTRIN) 800 MG Tab TAKE 1 TABLET BY MOUTH EVERY 12 HOURS AS NEEDED FOR HEADACHE OR  INFLAMMATION  (TAKE  WITH  FOOD) 180 Tablet 0    Budesonide, Inhalation, 180 MCG/ACT AEROSOL POWDER, BREATH ACTIVATED Inhale 1 Puff every day. 1 Each 6    albuterol (PROVENTIL) 2.5mg/3ml Nebu Soln solution for nebulization Take 3 mL by nebulization every four hours as needed for Shortness of Breath. 540 mL 0    Vitamin D, Cholecalciferol, 50 MCG (2000 UT) Cap Take 2,000 Units by mouth every day.      escitalopram (LEXAPRO) 20 MG tablet Take 1 Tablet by mouth every day. 90 Tablet 3    levothyroxine (SYNTHROID) 50 MCG Tab Take 1 Tablet by mouth every morning on an empty stomach. 90 Tablet 3    omeprazole (PRILOSEC) 40 MG delayed-release capsule Take 1 Cap by mouth every day. 90 Cap 3     No current facility-administered  "medications for this visit.     Patient has no known allergies.  Past Medical History:   Diagnosis Date    Asthma     Elevated glycohemoglobin     Frequent headaches     History of 2019 novel coronavirus disease (COVID-19) 3/3/2022    10/2021    History of chickenpox 2011    developed this at 21    Impaired glucose tolerance     Mixed hyperlipidemia 3/3/2022    Morbid obesity with BMI of 40.0-44.9, adult ([facility]) 3/3/2022    PCOS (polycystic ovarian syndrome) 12/15/2009    Type 2 diabetes mellitus without complication, without long-term current use of insulin ([facility]) 3/3/2022        EXAM:  /64 (BP Location: Left arm, Patient Position: Sitting, BP Cuff Size: Adult)   Pulse 66   Temp 36.4 °C (97.6 °F) (Temporal)   Ht 1.626 m (5' 4\")   Wt 111 kg (245 lb 9.6 oz)   SpO2 96%     General: Alert, no conversational dyspnea or audible wheeze, non-toxic appearance.  Eyes: PERRL, conjunctiva slightly injected, no eye discharge.  Sinuses: tender over maxillary / frontal sinuses.  Throat: Erythematous injection without exudate.   Neck: Supple, with no adenopathy.  Lungs: Clear to auscultation bilaterally, no wheeze, crackles or rhonchi.   Heart: Regular rate without murmur.  Skin: Warm and dry without rash.     ASSESSMENT:   33-year-old female with history of asthma, history of allergic rhinitis, history of seasonal sinusitis presents for evaluation for sinusitis for the past 3 days.  Reports she has been following her seasonal allergy regimen of second-generation antihistamine daily and Flonase for the past month and in the past 3 days she has worsening of her sinus symptoms.  She denies fever and chills, she denies sick contact.  Discussed with patient antibiotics unlikely to help at this time.  1. Acute frontal sinusitis, recurrence not specified    2. Seasonal allergic rhinitis due to pollen    3. Moderate persistent asthma without complication       Most consistent with bacterial sinusitis, patient does have history of " allergy patient does have history of moderate persistent asthma.  Recommend use of azelastine nasal spray to help with nasal itchiness, I did prescribe a course of 5 days prednisone 20 to help with both the allergy sinusitis and asthma.  Recommend patient to start using budesonide daily for the next 7 days to avoid exacerbation      1. Educated patient that majority of upper respiratory infections are viral and do not need antibiotics. As symptoms have been worsening over the last week, will treat with antibiotics.  2. Twice daily use of nasal saline rinse or Neti-Pot.  3. OTC anti-pyretics and decongestants as needed.  4. Follow-up in office or urgent care for worsening symptoms, difficulty breathing, lack of expected recovery, or should new symptoms or problems arise.

## 2023-05-09 DIAGNOSIS — E01.0 THYROMEGALY: ICD-10-CM

## 2023-05-09 DIAGNOSIS — G44.209 TENSION HEADACHE: ICD-10-CM

## 2023-05-09 RX ORDER — LEVOTHYROXINE SODIUM 0.05 MG/1
TABLET ORAL
Qty: 90 TABLET | Refills: 0 | Status: SHIPPED | OUTPATIENT
Start: 2023-05-09 | End: 2023-06-29 | Stop reason: SDUPTHER

## 2023-05-09 RX ORDER — IBUPROFEN 800 MG/1
TABLET ORAL
Qty: 180 TABLET | Refills: 0 | Status: SHIPPED | OUTPATIENT
Start: 2023-05-09

## 2023-06-21 ENCOUNTER — APPOINTMENT (OUTPATIENT)
Dept: MEDICAL GROUP | Facility: MEDICAL CENTER | Age: 33
End: 2023-06-21
Payer: COMMERCIAL

## 2023-06-26 ENCOUNTER — HOSPITAL ENCOUNTER (OUTPATIENT)
Dept: LAB | Facility: MEDICAL CENTER | Age: 33
End: 2023-06-26
Attending: FAMILY MEDICINE
Payer: COMMERCIAL

## 2023-06-26 DIAGNOSIS — E78.2 MIXED HYPERLIPIDEMIA: ICD-10-CM

## 2023-06-26 DIAGNOSIS — E11.9 TYPE 2 DIABETES MELLITUS WITHOUT COMPLICATION, WITHOUT LONG-TERM CURRENT USE OF INSULIN (HCC): ICD-10-CM

## 2023-06-26 DIAGNOSIS — E28.2 PCOS (POLYCYSTIC OVARIAN SYNDROME): Chronic | ICD-10-CM

## 2023-06-26 LAB
ALBUMIN SERPL BCP-MCNC: 4.4 G/DL (ref 3.2–4.9)
ALBUMIN/GLOB SERPL: 1.8 G/DL
ALP SERPL-CCNC: 61 U/L (ref 30–99)
ALT SERPL-CCNC: 20 U/L (ref 2–50)
ANION GAP SERPL CALC-SCNC: 11 MMOL/L (ref 7–16)
AST SERPL-CCNC: 14 U/L (ref 12–45)
BILIRUB SERPL-MCNC: 0.3 MG/DL (ref 0.1–1.5)
BUN SERPL-MCNC: 13 MG/DL (ref 8–22)
CALCIUM ALBUM COR SERPL-MCNC: 8.9 MG/DL (ref 8.5–10.5)
CALCIUM SERPL-MCNC: 9.2 MG/DL (ref 8.5–10.5)
CHLORIDE SERPL-SCNC: 104 MMOL/L (ref 96–112)
CHOLEST SERPL-MCNC: 186 MG/DL (ref 100–199)
CO2 SERPL-SCNC: 21 MMOL/L (ref 20–33)
CREAT SERPL-MCNC: 0.62 MG/DL (ref 0.5–1.4)
EST. AVERAGE GLUCOSE BLD GHB EST-MCNC: 143 MG/DL
FASTING STATUS PATIENT QL REPORTED: NORMAL
GFR SERPLBLD CREATININE-BSD FMLA CKD-EPI: 120 ML/MIN/1.73 M 2
GLOBULIN SER CALC-MCNC: 2.4 G/DL (ref 1.9–3.5)
GLUCOSE SERPL-MCNC: 87 MG/DL (ref 65–99)
HBA1C MFR BLD: 6.6 % (ref 4–5.6)
HDLC SERPL-MCNC: 29 MG/DL
LDLC SERPL CALC-MCNC: 119 MG/DL
POTASSIUM SERPL-SCNC: 4.2 MMOL/L (ref 3.6–5.5)
PROT SERPL-MCNC: 6.8 G/DL (ref 6–8.2)
SODIUM SERPL-SCNC: 136 MMOL/L (ref 135–145)
TRIGL SERPL-MCNC: 189 MG/DL (ref 0–149)

## 2023-06-26 PROCEDURE — 36415 COLL VENOUS BLD VENIPUNCTURE: CPT

## 2023-06-26 PROCEDURE — 80053 COMPREHEN METABOLIC PANEL: CPT

## 2023-06-26 PROCEDURE — 80061 LIPID PANEL: CPT

## 2023-06-26 PROCEDURE — 83036 HEMOGLOBIN GLYCOSYLATED A1C: CPT

## 2023-06-28 DIAGNOSIS — N92.1 MENOMETRORRHAGIA: ICD-10-CM

## 2023-06-29 ENCOUNTER — OFFICE VISIT (OUTPATIENT)
Dept: MEDICAL GROUP | Facility: MEDICAL CENTER | Age: 33
End: 2023-06-29
Payer: COMMERCIAL

## 2023-06-29 ENCOUNTER — HOSPITAL ENCOUNTER (OUTPATIENT)
Dept: LAB | Facility: MEDICAL CENTER | Age: 33
End: 2023-06-29
Attending: FAMILY MEDICINE
Payer: COMMERCIAL

## 2023-06-29 VITALS
BODY MASS INDEX: 42.15 KG/M2 | HEIGHT: 64 IN | HEART RATE: 78 BPM | OXYGEN SATURATION: 96 % | DIASTOLIC BLOOD PRESSURE: 68 MMHG | WEIGHT: 246.91 LBS | RESPIRATION RATE: 14 BRPM | SYSTOLIC BLOOD PRESSURE: 112 MMHG | TEMPERATURE: 97.6 F

## 2023-06-29 DIAGNOSIS — N92.1 MENOMETRORRHAGIA: ICD-10-CM

## 2023-06-29 DIAGNOSIS — E03.9 IDIOPATHIC HYPOTHYROIDISM: ICD-10-CM

## 2023-06-29 DIAGNOSIS — Z23 NEED FOR DIPHTHERIA-TETANUS-PERTUSSIS (TDAP) VACCINE: ICD-10-CM

## 2023-06-29 DIAGNOSIS — E11.9 TYPE 2 DIABETES MELLITUS WITHOUT COMPLICATION, WITHOUT LONG-TERM CURRENT USE OF INSULIN (HCC): ICD-10-CM

## 2023-06-29 DIAGNOSIS — E78.2 MIXED HYPERLIPIDEMIA: ICD-10-CM

## 2023-06-29 DIAGNOSIS — E01.0 THYROMEGALY: ICD-10-CM

## 2023-06-29 DIAGNOSIS — Z63.4 BEREAVEMENT: ICD-10-CM

## 2023-06-29 DIAGNOSIS — E66.01 MORBID OBESITY WITH BMI OF 40.0-44.9, ADULT (HCC): ICD-10-CM

## 2023-06-29 DIAGNOSIS — F41.8 SITUATIONAL ANXIETY: ICD-10-CM

## 2023-06-29 DIAGNOSIS — E28.2 PCOS (POLYCYSTIC OVARIAN SYNDROME): Chronic | ICD-10-CM

## 2023-06-29 LAB
BASOPHILS # BLD AUTO: 0.6 % (ref 0–1.8)
BASOPHILS # BLD: 0.06 K/UL (ref 0–0.12)
EOSINOPHIL # BLD AUTO: 0.49 K/UL (ref 0–0.51)
EOSINOPHIL NFR BLD: 4.7 % (ref 0–6.9)
ERYTHROCYTE [DISTWIDTH] IN BLOOD BY AUTOMATED COUNT: 47.3 FL (ref 35.9–50)
HCT VFR BLD AUTO: 44 % (ref 37–47)
HGB BLD-MCNC: 14.9 G/DL (ref 12–16)
IMM GRANULOCYTES # BLD AUTO: 0.05 K/UL (ref 0–0.11)
IMM GRANULOCYTES NFR BLD AUTO: 0.5 % (ref 0–0.9)
LYMPHOCYTES # BLD AUTO: 3.49 K/UL (ref 1–4.8)
LYMPHOCYTES NFR BLD: 33.2 % (ref 22–41)
MCH RBC QN AUTO: 31.1 PG (ref 27–33)
MCHC RBC AUTO-ENTMCNC: 33.9 G/DL (ref 32.2–35.5)
MCV RBC AUTO: 91.9 FL (ref 81.4–97.8)
MONOCYTES # BLD AUTO: 0.66 K/UL (ref 0–0.85)
MONOCYTES NFR BLD AUTO: 6.3 % (ref 0–13.4)
NEUTROPHILS # BLD AUTO: 5.76 K/UL (ref 1.82–7.42)
NEUTROPHILS NFR BLD: 54.7 % (ref 44–72)
NRBC # BLD AUTO: 0 K/UL
NRBC BLD-RTO: 0 /100 WBC (ref 0–0.2)
PLATELET # BLD AUTO: 440 K/UL (ref 164–446)
PMV BLD AUTO: 9.2 FL (ref 9–12.9)
RBC # BLD AUTO: 4.79 M/UL (ref 4.2–5.4)
THYROPEROXIDASE AB SERPL-ACNC: 10 IU/ML (ref 0–9)
TSH SERPL DL<=0.005 MIU/L-ACNC: 2.57 UIU/ML (ref 0.38–5.33)
WBC # BLD AUTO: 10.5 K/UL (ref 4.8–10.8)

## 2023-06-29 PROCEDURE — 36415 COLL VENOUS BLD VENIPUNCTURE: CPT

## 2023-06-29 PROCEDURE — 99214 OFFICE O/P EST MOD 30 MIN: CPT | Mod: 25 | Performed by: FAMILY MEDICINE

## 2023-06-29 PROCEDURE — 90471 IMMUNIZATION ADMIN: CPT | Performed by: FAMILY MEDICINE

## 2023-06-29 PROCEDURE — 90715 TDAP VACCINE 7 YRS/> IM: CPT | Performed by: FAMILY MEDICINE

## 2023-06-29 PROCEDURE — 85025 COMPLETE CBC W/AUTO DIFF WBC: CPT

## 2023-06-29 PROCEDURE — 3078F DIAST BP <80 MM HG: CPT | Performed by: FAMILY MEDICINE

## 2023-06-29 PROCEDURE — 3074F SYST BP LT 130 MM HG: CPT | Performed by: FAMILY MEDICINE

## 2023-06-29 PROCEDURE — 86376 MICROSOMAL ANTIBODY EACH: CPT

## 2023-06-29 PROCEDURE — 84443 ASSAY THYROID STIM HORMONE: CPT

## 2023-06-29 RX ORDER — METFORMIN HYDROCHLORIDE 750 MG/1
750 TABLET, EXTENDED RELEASE ORAL DAILY
Qty: 180 TABLET | Refills: 3 | Status: SHIPPED | OUTPATIENT
Start: 2023-06-29 | End: 2024-01-21 | Stop reason: SDUPTHER

## 2023-06-29 RX ORDER — ATORVASTATIN CALCIUM 10 MG/1
10 TABLET, FILM COATED ORAL NIGHTLY
Qty: 90 TABLET | Refills: 3 | Status: SHIPPED | OUTPATIENT
Start: 2023-06-29

## 2023-06-29 RX ORDER — LEVOTHYROXINE SODIUM 0.05 MG/1
50 TABLET ORAL
Qty: 90 TABLET | Refills: 3 | Status: SHIPPED | OUTPATIENT
Start: 2023-06-29

## 2023-06-29 RX ORDER — ESCITALOPRAM OXALATE 20 MG/1
20 TABLET ORAL DAILY
Qty: 90 TABLET | Refills: 3 | Status: SHIPPED | OUTPATIENT
Start: 2023-06-29 | End: 2024-01-21 | Stop reason: SDUPTHER

## 2023-06-29 RX ORDER — METFORMIN HYDROCHLORIDE 500 MG/1
500 TABLET, EXTENDED RELEASE ORAL 2 TIMES DAILY
Qty: 180 TABLET | Refills: 3 | Status: SHIPPED | OUTPATIENT
Start: 2023-06-29 | End: 2023-06-29

## 2023-06-29 SDOH — SOCIAL STABILITY - SOCIAL INSECURITY: DISSAPEARANCE AND DEATH OF FAMILY MEMBER: Z63.4

## 2023-06-29 ASSESSMENT — FIBROSIS 4 INDEX: FIB4 SCORE: 0.23

## 2023-06-29 NOTE — PROGRESS NOTES
Diabetes Focused Exam:    Chief Complaint   Patient presents with    Diabetes Follow-up     6m fv    Dyslipidemia      Subjective:   HPI  Minnie Edward is a 33 y.o. female who presents for follow up of chronic conditions of diabetes mellitus, hypertension and hyperlipidemia. She indicates that she is feeling well and denies any symptoms referable to the above diagnoses. Specifically denies chest pain, palpitations, dyspnea, orthopnea, PND or peripheral edema. Also denies polyuria, polydipsia, urinary complaints, abdominal complaints, myalgias, numbness, weakness or other related symptoms.     She has thyromegaly and hypothyroidism.  Lab orders placed.    PCOS is unfortunately active.  Had her tubes removed.  Periods now just spotting for a few days.  Is taking the metformin.  Discussed the PCOS and her obesity.  Increasing the metormin to 750 twice a day.    The patient is not taking ASA every day.  She is taking all other medications as prescribed. Patient denies any side effects of medication.  DM: A1c goal <7  Glucose monitoring frequency: does not check  Hypoglycemic episodes none noted  Diabetic complications: none  ACR Albumin/Creatinine Ratio goal <30   Does not have HTN: Blood pressure goal <140/<80. Currently Rx ACE/ARB: No  Hyperlipidemia:Cholesterol goal LDL <100, total/HDL <5. Currently Rx Statin: Yes  Last eye exam 1/2023  Denies visual blurring, double vision, eye pain and floaters  Last monofilament foot exam: 12/2022   Denies foot pain, numbness, calluses, ulcers    See medications and orders placed in encounter report.  Past medical history, family history, social history reviewed and updated as documented in medical record.    Current medications including changes today:  Current Outpatient Medications   Medication Sig Dispense Refill    metFORMIN ER (GLUCOPHAGE XR) 500 MG TABLET SR 24 HR Take 1 Tablet by mouth 2 times a day. 180 Tablet 3    escitalopram (LEXAPRO) 20 MG tablet Take  "1 Tablet by mouth every day. 90 Tablet 3    levothyroxine (SYNTHROID) 50 MCG Tab TAKE 1 TABLET BY MOUTH IN THE MORNING ON AN EMPTY STOMACH 90 Tablet 0    ibuprofen (MOTRIN) 800 MG Tab TAKE 1 TABLET BY MOUTH EVERY 12 HOURS AS NEEDED FOR  HEADACHE  OR  INFLAMMATION.  TAKE  WITH  FOOD 180 Tablet 0    atorvastatin (LIPITOR) 10 MG Tab Take 1 Tablet by mouth every evening. 90 Tablet 2    albuterol (PROAIR HFA) 108 (90 Base) MCG/ACT Aero Soln inhalation aerosol Inhale 2 Puffs every 6 hours as needed for Shortness of Breath. 8.5 g 6    Budesonide, Inhalation, 180 MCG/ACT AEROSOL POWDER, BREATH ACTIVATED Inhale 1 Puff every day. 1 Each 6    albuterol (PROVENTIL) 2.5mg/3ml Nebu Soln solution for nebulization Take 3 mL by nebulization every four hours as needed for Shortness of Breath. 540 mL 0    Vitamin D, Cholecalciferol, 50 MCG (2000 UT) Cap Take 2,000 Units by mouth every day.      omeprazole (PRILOSEC) 40 MG delayed-release capsule Take 1 Cap by mouth every day. 90 Cap 3     No current facility-administered medications for this visit.     Allergies: No Known Allergies   Social History     Tobacco Use    Smoking status: Never    Smokeless tobacco: Never   Vaping Use    Vaping Use: Never used   Substance Use Topics    Alcohol use: No     Alcohol/week: 0.0 oz    Drug use: No     Exercise: she is limited, needs to exercise more  Health Maintenance/Immunizations: discussed, due for TDaP, will be given here today    ROS  Pertinent  ROS findings as above. Denies shortness of breath.       Objective:     OBJECTIVE:  /68 (BP Location: Right arm, Patient Position: Sitting, BP Cuff Size: Large adult)   Pulse 78   Temp 36.4 °C (97.6 °F) (Temporal)   Resp 14   Ht 1.626 m (5' 4\")   Wt 112 kg (246 lb 14.6 oz)   SpO2 96%   BMI 42.38 kg/m²  Body mass index is 42.38 kg/m². BMI: severely obese  General: No apparent distress, conversant, cooperative and pleasant with the examination.  Psych: Alert and oriented x4, judgment and " insight normal  Neck: No JVD or bruits, no adenopathy, supple  Thyroid: normal to inspection and palpation  Lungs: negative findings: normal respiratory rate and rhythm, lungs clear to auscultation  Heart: negative findings: regular rate and rhythm, S1 normal, S2 normal, no murmurs, clicks, or gallops  Abdomen: Soft, nontender, no hepatosplenomegaly or masses, normal bowel sounds  Skin: No rashes, no cyanosis, no lesions or ulcers  Extremities: No cyanosis clubbing or edema.     POC labs : No results found for: POCGLUCOSE       Last labs    Lab Results   Component Value Date/Time    CHOLSTRLTOT 186 06/26/2023 12:02 PM     (H) 06/26/2023 12:02 PM    HDL 29 (A) 06/26/2023 12:02 PM    TRIGLYCERIDE 189 (H) 06/26/2023 12:02 PM       Lab Results   Component Value Date/Time    SODIUM 136 06/26/2023 12:02 PM    POTASSIUM 4.2 06/26/2023 12:02 PM    GLUCOSE 87 06/26/2023 12:02 PM    BUNCREATRAT 15.7 03/07/2022 09:57 AM    BUNCREATRAT 16 04/28/2021 07:12 AM    BUNCREATRAT 16 03/24/2011 08:02 AM    GLOMRATE >59 03/24/2011 08:02 AM     Lab Results   Component Value Date/Time    HBA1C 6.6 (H) 06/26/2023 12:02 PM    HBA1C 6.3 (H) 12/14/2022 09:44 AM    HBA1C 6.1 (H) 07/21/2022 07:23 AM     Lab Results   Component Value Date/Time    MALBCRT 19 07/21/2022 07:23 AM    MICROALBUR 4.1 07/21/2022 07:23 AM          Assessment/Plan:   Medications, refills, and referrals per orders.   1. Type 2 diabetes mellitus without complication, without long-term current use of insulin (HCC)  POCT Microalbumin Creat Ratio Urine    metFORMIN ER (GLUCOPHAGE XR) 500 MG TABLET SR 24 HR      2. Need for diphtheria-tetanus-pertussis (Tdap) vaccine  Tdap =>6yo IM      3. Idiopathic hypothyroidism  TSH    THYROID PEROXIDASE  (TPO) AB      4. Thyromegaly  TSH    THYROID PEROXIDASE  (TPO) AB      5. Morbid obesity with BMI of 40.0-44.9, adult (HCC)        6. Mixed hyperlipidemia        7. PCOS (polycystic ovarian syndrome)  metFORMIN ER (GLUCOPHAGE XR)  500 MG TABLET SR 24 HR      8. Situational anxiety  escitalopram (LEXAPRO) 20 MG tablet      9. Bereavement  escitalopram (LEXAPRO) 20 MG tablet        DM2 A1c is at goal   Patient to monitor sugars: declines.  Has glucometer for use as needed   Discussed diet, exercise, disease management and weight loss goals.   Education and advise provided today:All medications, side effects and compliance (discussed carefully)  Annual eye examinations at Ophthalmology  Diabetic diet discussed in detail, written exchange diet given  Glycohemoglobin and other lab monitoring  Labs immediately prior to next visit  Long term diabetic complications  Low cholesterol diet, weight control and daily exercise    Followup:   Do labs and RTC 6 months, sooner should new symptoms or problems arise.

## 2023-06-29 NOTE — LETTER
[facility]  Rubens Gaspar M.D.  [address] [facility] [address]  [place] [address]  Fax: [phone]   Authorization for Release/Disclosure of   Protected Health Information   Name: MINNIE EDWARD : 1990 SSN: [id number]   Address: [address] [place] [address] [address] [place] [address] Phone:    [phone] (home)    I authorize the entity listed below to release/disclose the PHI below to:   [facility]/Rubens Gaspar M.D. and Rubens Gaspar M.D.   Provider or Entity Name:     Address   City, State, Zip   Phone:      Fax:     Reason for request: continuity of care   Information to be released:    [  ] LAST COLONOSCOPY,  including any PATH REPORT and follow-up  [  ] LAST FIT/COLOGUARD RESULT [  ] LAST DEXA  [  ] LAST MAMMOGRAM  [  ] LAST PAP  [  ] LAST LABS [XXX] RETINA EXAM REPORT  [  ] IMMUNIZATION RECORDS  [  ] Release all info      [  ] Check here and initial the line next to each item to release ALL health information INCLUDING  _____ Care and treatment for drug and / or alcohol abuse  _____ HIV testing, infection status, or AIDS  _____ Genetic Testing    DATES OF SERVICE OR TIME PERIOD TO BE DISCLOSED: _____________  I understand and acknowledge that:  * This Authorization may be revoked at any time by you in writing, except if your health information has already been used or disclosed.  * Your health information that will be used or disclosed as a result of you signing this authorization could be re-disclosed by the recipient. If this occurs, your re-disclosed health information may no longer be protected by State or Federal laws.  * You may refuse to sign this Authorization. Your refusal will not affect your ability to obtain treatment.  * This Authorization becomes effective upon signing and will  on (date) __________.      If no date is indicated, this Authorization will  one (1) year from the signature date.    Name: Minnie Edward  Continuity of Care  Date:   2023      PLEASE FAX REQUESTED RECORDS BACK TO: [phone]

## 2023-07-05 ENCOUNTER — TELEPHONE (OUTPATIENT)
Dept: MEDICAL GROUP | Facility: MEDICAL CENTER | Age: 33
End: 2023-07-05
Payer: COMMERCIAL

## 2023-07-05 NOTE — TELEPHONE ENCOUNTER
Pt's microalbumin will need to be recollected, as it was never resulted at the lab. Pt has appt scheduled for January, unless you would like this sooner.

## 2023-09-25 ENCOUNTER — OFFICE VISIT (OUTPATIENT)
Dept: MEDICAL GROUP | Facility: MEDICAL CENTER | Age: 33
End: 2023-09-25
Payer: COMMERCIAL

## 2023-09-25 VITALS
OXYGEN SATURATION: 96 % | DIASTOLIC BLOOD PRESSURE: 64 MMHG | TEMPERATURE: 98 F | BODY MASS INDEX: 41.78 KG/M2 | HEART RATE: 76 BPM | WEIGHT: 244.71 LBS | HEIGHT: 64 IN | SYSTOLIC BLOOD PRESSURE: 118 MMHG

## 2023-09-25 DIAGNOSIS — E66.01 MORBID OBESITY WITH BMI OF 40.0-44.9, ADULT (HCC): ICD-10-CM

## 2023-09-25 DIAGNOSIS — E78.2 MIXED HYPERLIPIDEMIA: ICD-10-CM

## 2023-09-25 DIAGNOSIS — F41.8 SITUATIONAL ANXIETY: ICD-10-CM

## 2023-09-25 DIAGNOSIS — E55.9 VITAMIN D DEFICIENCY: ICD-10-CM

## 2023-09-25 DIAGNOSIS — R51.9 INCREASED FREQUENCY OF HEADACHES: ICD-10-CM

## 2023-09-25 DIAGNOSIS — J45.31 MILD PERSISTENT ASTHMA WITH ACUTE EXACERBATION: ICD-10-CM

## 2023-09-25 DIAGNOSIS — Z23 NEED FOR IMMUNIZATION AGAINST INFLUENZA: ICD-10-CM

## 2023-09-25 DIAGNOSIS — R10.13 EPIGASTRIC ABDOMINAL PAIN: ICD-10-CM

## 2023-09-25 DIAGNOSIS — J45.40 MODERATE PERSISTENT ASTHMA WITHOUT COMPLICATION: ICD-10-CM

## 2023-09-25 DIAGNOSIS — E11.9 TYPE 2 DIABETES MELLITUS WITHOUT COMPLICATION, WITHOUT LONG-TERM CURRENT USE OF INSULIN (HCC): ICD-10-CM

## 2023-09-25 DIAGNOSIS — E01.0 THYROMEGALY: ICD-10-CM

## 2023-09-25 DIAGNOSIS — K21.9 GASTROESOPHAGEAL REFLUX DISEASE, UNSPECIFIED WHETHER ESOPHAGITIS PRESENT: ICD-10-CM

## 2023-09-25 DIAGNOSIS — J45.30 MILD PERSISTENT ASTHMA WITHOUT COMPLICATION: ICD-10-CM

## 2023-09-25 PROCEDURE — 3074F SYST BP LT 130 MM HG: CPT | Performed by: FAMILY MEDICINE

## 2023-09-25 PROCEDURE — 90686 IIV4 VACC NO PRSV 0.5 ML IM: CPT | Performed by: FAMILY MEDICINE

## 2023-09-25 PROCEDURE — 90471 IMMUNIZATION ADMIN: CPT | Performed by: FAMILY MEDICINE

## 2023-09-25 PROCEDURE — 99214 OFFICE O/P EST MOD 30 MIN: CPT | Mod: 25 | Performed by: FAMILY MEDICINE

## 2023-09-25 PROCEDURE — 3078F DIAST BP <80 MM HG: CPT | Performed by: FAMILY MEDICINE

## 2023-09-25 RX ORDER — ALBUTEROL SULFATE 90 UG/1
2 AEROSOL, METERED RESPIRATORY (INHALATION) EVERY 6 HOURS PRN
Qty: 8.5 G | Refills: 6 | Status: SHIPPED | OUTPATIENT
Start: 2023-09-25 | End: 2024-03-08 | Stop reason: SDUPTHER

## 2023-09-25 RX ORDER — ALBUTEROL SULFATE 2.5 MG/3ML
2.5 SOLUTION RESPIRATORY (INHALATION) EVERY 4 HOURS PRN
Qty: 540 ML | Refills: 0 | Status: SHIPPED | OUTPATIENT
Start: 2023-09-25

## 2023-09-25 RX ORDER — ALPRAZOLAM 0.25 MG/1
0.25 TABLET ORAL 3 TIMES DAILY PRN
Qty: 21 TABLET | Refills: 0 | Status: SHIPPED | OUTPATIENT
Start: 2023-09-25 | End: 2024-01-17

## 2023-09-25 RX ORDER — OMEPRAZOLE 40 MG/1
40 CAPSULE, DELAYED RELEASE ORAL DAILY
Qty: 90 CAPSULE | Refills: 3 | Status: SHIPPED | OUTPATIENT
Start: 2023-09-25

## 2023-09-25 RX ORDER — SUCRALFATE 1 G/1
1 TABLET ORAL
Qty: 120 TABLET | Refills: 3 | Status: SHIPPED | OUTPATIENT
Start: 2023-09-25

## 2023-09-25 ASSESSMENT — FIBROSIS 4 INDEX: FIB4 SCORE: 0.24

## 2023-09-25 NOTE — PROGRESS NOTES
Diabetes Focused Exam:    Chief Complaint   Patient presents with    GI Problem     Upper GI Pain     Heartburn     X2 wks     Nausea    Diabetes Mellitus      Subjective:   HPI  Minnie Edward is a 33 y.o. female who presents for follow up of chronic conditions of diabetes mellitus and hyperlipidemia. She indicates that she is feeling well and denies any symptoms referable to the above diagnoses. Specifically denies chest pain, palpitations, dyspnea, orthopnea, PND or peripheral edema. Also denies polyuria, polydipsia, urinary complaints, abdominal complaints, myalgias, numbness, weakness or other related symptoms.     Situational anxiety, needs alprazolam renewal, uses rarely prn.  Last filled 2022.    Sister with history of brain tumor.  She is having escalating headaches. She also had an infant sibling who  of a brain tumor just over a year old.     Getting epigastric abdominal pain. Early satiety.  Getting bloating as well.  Will start sucralfate as well.     The patient is not taking ASA every day.  She is taking all other medications as prescribed except the ibuprofen which she uses as needed only, rarely. Patient denies any side effects of medication.  DM: A1c goal <7  Glucose monitoring frequency: not doing  Hypoglycemic episodes none  Diabetic complications: none  ACR Albumin/Creatinine Ratio goal <30   Does not have hypertension: Blood pressure goal <140/<80. Currently Rx ACE/ARB: Not Indicated  Hyperlipidemia:Cholesterol goal LDL <100, total/HDL <5. Currently Rx Statin: Yes  Last eye exam 2023   Denies visual blurring, double vision, eye pain and floaters  Last monofilament foot exam: 2022  Denies foot pain, numbness, calluses, ulcers    See medications and orders placed in encounter report.  Past medical history, family history, social history reviewed and updated as documented in medical record.  Current medications including changes today:  Current Outpatient Medications    Medication Sig Dispense Refill    albuterol (PROAIR HFA) 108 (90 Base) MCG/ACT Aero Soln inhalation aerosol Inhale 2 Puffs every 6 hours as needed for Shortness of Breath. 8.5 g 6    albuterol (PROVENTIL) 2.5mg/3ml Nebu Soln solution for nebulization Take 3 mL by nebulization every four hours as needed for Shortness of Breath. 540 mL 0    ALPRAZolam (XANAX) 0.25 MG Tab Take 1 Tablet by mouth 3 times a day as needed for Anxiety for up to 7 days. 21 Tablet 0    omeprazole (PRILOSEC) 40 MG delayed-release capsule Take 1 Capsule by mouth every day. 90 Capsule 3    sucralfate (CARAFATE) 1 GM Tab Take 1 Tablet by mouth 4 Times a Day,Before Meals and at Bedtime. 120 Tablet 3    escitalopram (LEXAPRO) 20 MG tablet Take 1 Tablet by mouth every day. 90 Tablet 3    metFORMIN ER (GLUCOPHAGE XR) 750 MG TABLET SR 24 HR Take 1 Tablet by mouth every day. 180 Tablet 3    atorvastatin (LIPITOR) 10 MG Tab Take 1 Tablet by mouth every evening. 90 Tablet 3    levothyroxine (SYNTHROID) 50 MCG Tab Take 1 Tablet by mouth every morning on an empty stomach. 90 Tablet 3    ibuprofen (MOTRIN) 800 MG Tab TAKE 1 TABLET BY MOUTH EVERY 12 HOURS AS NEEDED FOR  HEADACHE  OR  INFLAMMATION.  TAKE  WITH  FOOD 180 Tablet 0    Budesonide, Inhalation, 180 MCG/ACT AEROSOL POWDER, BREATH ACTIVATED Inhale 1 Puff every day. 1 Each 6    Vitamin D, Cholecalciferol, 50 MCG (2000 UT) Cap Take 2,000 Units by mouth every day.       No current facility-administered medications for this visit.     Allergies: No Known Allergies   Social History     Tobacco Use    Smoking status: Never    Smokeless tobacco: Never   Vaping Use    Vaping Use: Never used   Substance Use Topics    Alcohol use: No     Alcohol/week: 0.0 oz    Drug use: No     Exercise: she is keeping as active as possible  Health Maintenance/Immunizations: discussed, flu vaccine administered today.    ROS  Pertinent  ROS findings as above.      Objective:     OBJECTIVE:  /64   Pulse 76   Temp 36.7  "°C (98 °F)   Ht 1.626 m (5' 4\")   Wt 111 kg (244 lb 11.4 oz)   SpO2 96%   BMI 42.00 kg/m²  Body mass index is 42 kg/m². BMI: severely obese  General: No apparent distress, conversant, cooperative and pleasant with the examination.  Psych: Alert and oriented x4, judgment and insight normal  Neck: No JVD or bruits, no adenopathy, supple  Thyroid: normal to inspection and palpation  Lungs: negative findings: normal respiratory rate and rhythm, lungs clear to auscultation  Heart: negative findings: regular rate and rhythm, S1 normal, S2 normal, no murmurs, clicks, or gallops  Abdomen: Soft, nontender, no hepatosplenomegaly or masses, normal bowel sounds  Skin: No rashes, no cyanosis, no lesions or ulcers  Extremities: No cyanosis clubbing or edema.     POC labs : No results found for: \"POCGLUCOSE\"       Last labs    Lab Results   Component Value Date/Time    CHOLSTRLTOT 186 06/26/2023 12:02 PM     (H) 06/26/2023 12:02 PM    HDL 29 (A) 06/26/2023 12:02 PM    TRIGLYCERIDE 189 (H) 06/26/2023 12:02 PM       Lab Results   Component Value Date/Time    SODIUM 136 06/26/2023 12:02 PM    POTASSIUM 4.2 06/26/2023 12:02 PM    GLUCOSE 87 06/26/2023 12:02 PM    BUNCREATRAT 15.7 03/07/2022 09:57 AM    BUNCREATRAT 16 04/28/2021 07:12 AM    BUNCREATRAT 16 03/24/2011 08:02 AM    GLOMRATE >59 03/24/2011 08:02 AM     Lab Results   Component Value Date/Time    HBA1C 6.6 (H) 06/26/2023 12:02 PM    HBA1C 6.3 (H) 12/14/2022 09:44 AM    HBA1C 6.1 (H) 07/21/2022 07:23 AM     Lab Results   Component Value Date/Time    MALBCRT 19 07/21/2022 07:23 AM    MICROALBUR 4.1 07/21/2022 07:23 AM          Assessment/Plan:   Medications, refills, and referrals per orders.   1. Need for immunization against influenza  INFLUENZA VACCINE QUAD INJ (PF)      2. Type 2 diabetes mellitus without complication, without long-term current use of insulin (HCC)  Comp Metabolic Panel    CBC WITHOUT DIFFERENTIAL    Lipid Profile    MICROALBUMIN CREAT RATIO " URINE    HEMOGLOBIN A1C      3. Mixed hyperlipidemia  Comp Metabolic Panel    Lipid Profile      4. Moderate persistent asthma without complication  CBC WITHOUT DIFFERENTIAL      5. Morbid obesity with BMI of 40.0-44.9, adult (HCC)        6. Thyromegaly        7. Vitamin D deficiency  VITAMIN D,25 HYDROXY (DEFICIENCY)      8. Mild persistent asthma without complication  albuterol (PROAIR HFA) 108 (90 Base) MCG/ACT Aero Soln inhalation aerosol      9. Mild persistent asthma with acute exacerbation  albuterol (PROVENTIL) 2.5mg/3ml Nebu Soln solution for nebulization      10. Situational anxiety  ALPRAZolam (XANAX) 0.25 MG Tab      11. Epigastric abdominal pain  omeprazole (PRILOSEC) 40 MG delayed-release capsule    sucralfate (CARAFATE) 1 GM Tab      12. Gastroesophageal reflux disease, unspecified whether esophagitis present  omeprazole (PRILOSEC) 40 MG delayed-release capsule    sucralfate (CARAFATE) 1 GM Tab      13. Increased frequency of headaches  CT-HEAD W/O        DM2 A1c is at goal   Patient to monitor sugars: not yet needed   Discussed diet, exercise, disease management and weight loss goals.   Education and advise provided today:All medications, side effects and compliance (discussed carefully)  Annual eye examinations at Ophthalmology  Diabetic diet discussed in detail, written exchange diet given  Foot care discussed and Podiatry visits  Glycohemoglobin and other lab monitoring  Labs immediately prior to next visit  Long term diabetic complications  Low cholesterol diet, weight control and daily exercise    Followup:   Do labs and RTC 4 months, sooner should new symptoms or problems arise.

## 2023-10-06 ENCOUNTER — HOSPITAL ENCOUNTER (OUTPATIENT)
Dept: RADIOLOGY | Facility: MEDICAL CENTER | Age: 33
End: 2023-10-06
Attending: FAMILY MEDICINE
Payer: COMMERCIAL

## 2023-10-06 DIAGNOSIS — R51.9 INCREASED FREQUENCY OF HEADACHES: ICD-10-CM

## 2023-10-06 PROCEDURE — 70450 CT HEAD/BRAIN W/O DYE: CPT

## 2023-10-09 DIAGNOSIS — R90.89 ABNORMAL CT OF BRAIN: ICD-10-CM

## 2023-10-09 DIAGNOSIS — R51.9 INCREASED FREQUENCY OF HEADACHES: ICD-10-CM

## 2023-10-09 DIAGNOSIS — J30.1 SEASONAL ALLERGIC RHINITIS DUE TO POLLEN: ICD-10-CM

## 2023-10-09 RX ORDER — AZELASTINE 1 MG/ML
1 SPRAY, METERED NASAL 2 TIMES DAILY
Qty: 30 ML | Refills: 3 | Status: SHIPPED | OUTPATIENT
Start: 2023-10-09

## 2023-12-01 ENCOUNTER — OFFICE VISIT (OUTPATIENT)
Dept: NEUROLOGY | Facility: MEDICAL CENTER | Age: 33
End: 2023-12-01
Attending: PSYCHIATRY & NEUROLOGY
Payer: COMMERCIAL

## 2023-12-01 ENCOUNTER — TELEPHONE (OUTPATIENT)
Dept: NEUROLOGY | Facility: MEDICAL CENTER | Age: 33
End: 2023-12-01

## 2023-12-01 VITALS
SYSTOLIC BLOOD PRESSURE: 110 MMHG | TEMPERATURE: 99.6 F | DIASTOLIC BLOOD PRESSURE: 70 MMHG | WEIGHT: 251.1 LBS | OXYGEN SATURATION: 94 % | HEART RATE: 64 BPM | BODY MASS INDEX: 42.87 KG/M2 | HEIGHT: 64 IN

## 2023-12-01 DIAGNOSIS — G43.009 MIGRAINE WITHOUT AURA AND WITHOUT STATUS MIGRAINOSUS, NOT INTRACTABLE: ICD-10-CM

## 2023-12-01 PROCEDURE — 3078F DIAST BP <80 MM HG: CPT | Performed by: PSYCHIATRY & NEUROLOGY

## 2023-12-01 PROCEDURE — 3074F SYST BP LT 130 MM HG: CPT | Performed by: PSYCHIATRY & NEUROLOGY

## 2023-12-01 PROCEDURE — 99205 OFFICE O/P NEW HI 60 MIN: CPT | Performed by: PSYCHIATRY & NEUROLOGY

## 2023-12-01 PROCEDURE — 99212 OFFICE O/P EST SF 10 MIN: CPT | Performed by: PSYCHIATRY & NEUROLOGY

## 2023-12-01 RX ORDER — METOCLOPRAMIDE 10 MG/1
TABLET ORAL
Qty: 45 TABLET | Refills: 1 | Status: SHIPPED | OUTPATIENT
Start: 2023-12-01

## 2023-12-01 RX ORDER — SUMATRIPTAN 100 MG/1
TABLET, FILM COATED ORAL
Qty: 9 TABLET | Refills: 6 | Status: SHIPPED | OUTPATIENT
Start: 2023-12-01

## 2023-12-01 RX ORDER — NAPROXEN SODIUM 550 MG/1
TABLET ORAL
Qty: 45 TABLET | Refills: 1 | Status: SHIPPED | OUTPATIENT
Start: 2023-12-01

## 2023-12-01 ASSESSMENT — ENCOUNTER SYMPTOMS
PHOTOPHOBIA: 0
MEMORY LOSS: 0
FALLS: 0
DOUBLE VISION: 0
HEADACHES: 1
LOSS OF CONSCIOUSNESS: 0
INSOMNIA: 0

## 2023-12-01 ASSESSMENT — FIBROSIS 4 INDEX: FIB4 SCORE: 0.24

## 2023-12-01 NOTE — PROGRESS NOTES
Bayron Edward is a 33 y.o. female who presents from the office of Dr. Rubens Gaspar MD, for consultation, with a history of migraine without aura.     JESSEE Hartman is a very pleasant 33-year-old right-handed woman whose headaches started, she estimates, about 10 years ago, and which seem to have improved slightly as her glycemic control improved for several months but then have recurred.    Prodrome: None    Aura: None    Headache: She describes a bifrontal pain onset which can then spread over the vertex or even over the nasal bridge.  It can be unilateral and retro-orbital.  At its worst it can throb, nausea can and soon at that point, but there are heightened sensitivities to everything, even her scalp is a little hypersensitive.  Simple physical activities make it worse, concentration severely curtailed as well.  She denies any autonomic symptoms or neck pain and stiffness.    Duration: Headaches typically last for 1 day including her recovery interval, but they can go up to 3 days at the longest.    Onset: Random over the course of the day.    Start: She thinks in her early 20s.    Frequency: She now gets them 2-3 times in a month, a pattern that existed prior to the resolution for about 3-4 months while she was getting her glycemic control in order.    Triggers: Sleep deprivation, elevated stress and hunger.  They have never been menstrual.    Work-up: With the recent recurrence, CT scan of the brain was done on October 6, 2023.  Though there is sulcal prominence throughout, there is no underlying structural pathology to the brain itself.    Treatment: She is only used over-the-counter Motrin which provides no benefit.  She has never been on prescription medication.    She has a history of PCOS and obesity, hypothyroidism, dyslipidemia, anxiety, GERD, asthma/RAD, and diabetes, type II, no history of CAD, CVA, PVD, autoimmune disease, liver or kidney disease, blood dyscrasia,  "malignancy, MS, seizure, IBD, tremor, glaucoma, kidney stones, or neurodegenerative disease.    Other than tubal ligation, there is no surgical history of note.    Her menses are regular, occurring once every 4 weeks with a 3-5-day duration.    Her sister suffers from headaches, she has 2 boys neither of whom suffer from headaches.  Neither of her parents have suffered from headache, her father has a history of stroke.    She rarely drinks alcohol, does not smoke.  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    She is on Lexapro, Prilosec, Carafate, Proventil and ProAir inhalers, Lipitor, Motrin 800 mg as needed, Synthroid and vitamin D.     Review of Systems   Constitutional:  Negative for malaise/fatigue.   Eyes:  Negative for double vision and photophobia.   Musculoskeletal:  Negative for falls.   Neurological:  Positive for headaches. Negative for loss of consciousness.   Psychiatric/Behavioral:  Negative for memory loss. The patient does not have insomnia.    All other systems reviewed and are negative.    Objective     /70 (BP Location: Right arm, Patient Position: Sitting, BP Cuff Size: Adult)   Pulse 64   Temp 37.6 °C (99.6 °F) (Temporal)   Ht 1.626 m (5' 4\")   Wt 114 kg (251 lb 1.7 oz)   SpO2 94%   BMI 43.10 kg/m²      Physical Exam    She appears in no acute distress.  Vital signs are stable.  There is no malar rash, jaw or temporal tenderness, jaw claudication, or allodynia.  The occipital ridge is mildly tender bilaterally, there is no spasm or tenderness of the cervical paraspinal and trapezius muscle bodies bilaterally.  Range of motion of the cervical spine is full, Lhermitte's phenomena is absent, compression maneuvers are negative.  Carotid pulses are present bilaterally without asymmetry.  Cardiac evaluation reveals a regular rhythm.  There is no lower extremity edema.     Neurological Exam    Fully oriented, there is no aphasia, agnosia, apraxia, or inattention.    PERRLA/EOMI, " funduscopic exam reveals sharp disc margins, and visual fields are full to finger counting on confrontation bilaterally.  Facial movements are symmetric, sensory exam is intact to temperature and light touch bilaterally.  The tongue and uvula are midline, there is no bulbar dysfunction.  Shoulder shrug and head rotation are normal.    Musculoskeletal exam reveals normal tone throughout, there is no tremor, asterixis, or drift.  Strength is 5/5 in all muscle groups.  Reflexes are brisk and present at all points, there are no asymmetries, none are dropped.  Both toes are downgoing.    She stands easily, gait is normal and station and stride length, armswing is symmetric.  Heel, toe, and tandem walking are all normal.  There is no appendicular dystaxia.  Fine motor control is intact in all 4 extremities with symmetric amplitude and frequencies with repetitive movements.    Sensory exam is intact to temperature and vibration, Romberg is absent.    Assessment & Plan     1. Migraine without aura and without status migrainosus, not intractable  She does suffer from migraine without aura, the diagnosis is actually is fairly straightforward.  I cannot make much of the prominent sulci over the convexities of both cerebral hemispheres, it is symmetric, has nothing to do with the headaches and cells, and is most likely a normal variant.  There is nothing abnormal and parenchymal in nature that is of significance.  She was reassured about this.    She has a less than 0.2% chance that these are headaches symptomatic of any other neurologic or systemic disease, thus additional work-up is not necessary.  Fortunately the headaches are not frequent enough that maintenance therapies are really necessary.  We talked about the use of maintenance versus simple rescue medication.  She is opting for the latter.  She understands that even if effective rescue was found, it does not affect the long-term evolution of headaches and reduction in  "frequency, etc.    Imitrex 100 mg/Anaprox  mg/Reglan 10 mg will be taken as a drug \"cocktail\".  She uses 1 tablet of each at first pain onset, and this can be repeated within an hour, or later, if needed.  Side effects were reviewed.  We will follow-up in 6 months.    - sumatriptan (IMITREX) 100 MG tablet; 1 tab at headache onset; repeat in 1 hour prn  Dispense: 9 Tablet; Refill: 6  - naproxen sodium (ANAPROX DS) 550 MG tablet; 1-2 tab headache onset; repeat in 4-6 hours prn.  Dispense: 45 Tablet; Refill: 1  - metoclopramide (REGLAN) 10 MG Tab; 1-3 tab at headache/nausea onset; repeat in 4-6 hours prn  Dispense: 45 Tablet; Refill: 1    Time: 60 minutes in total spent on patient care including pre-charting, record review, discussion with healthcare staff and documentation.  This includes face-to-face time for exam, review, discussion, as well as counseling and coordinating care.            "

## 2023-12-01 NOTE — TELEPHONE ENCOUNTER
Received Refill PA request via [facility]  for Sumatriptan (Imitrex) 100 MG Tabs. (Quantity:36, Day Supply:90) - Copay $30.00 Or $10.00 #18/30 DS (No PA req'd)     Insurance: [facility]   Member ID:  [id number]  BIN: [id number]  PCN: [id number]  Group: [id number]     Ran Test claim via [place] & medication Pays for a $30.00 copay. Will outreach to patient to offer specialty pharmacy services and or release to preferred pharmacy

## 2024-01-08 ENCOUNTER — HOSPITAL ENCOUNTER (OUTPATIENT)
Dept: LAB | Facility: MEDICAL CENTER | Age: 34
End: 2024-01-08
Attending: FAMILY MEDICINE
Payer: COMMERCIAL

## 2024-01-08 DIAGNOSIS — J45.40 MODERATE PERSISTENT ASTHMA WITHOUT COMPLICATION: ICD-10-CM

## 2024-01-08 DIAGNOSIS — E78.2 MIXED HYPERLIPIDEMIA: ICD-10-CM

## 2024-01-08 DIAGNOSIS — E55.9 VITAMIN D DEFICIENCY: ICD-10-CM

## 2024-01-08 DIAGNOSIS — E11.9 TYPE 2 DIABETES MELLITUS WITHOUT COMPLICATION, WITHOUT LONG-TERM CURRENT USE OF INSULIN (HCC): ICD-10-CM

## 2024-01-08 LAB
25(OH)D3 SERPL-MCNC: 18 NG/ML (ref 30–100)
ALBUMIN SERPL BCP-MCNC: 4.1 G/DL (ref 3.2–4.9)
ALBUMIN/GLOB SERPL: 1.6 G/DL
ALP SERPL-CCNC: 62 U/L (ref 30–99)
ALT SERPL-CCNC: 32 U/L (ref 2–50)
ANION GAP SERPL CALC-SCNC: 13 MMOL/L (ref 7–16)
AST SERPL-CCNC: 16 U/L (ref 12–45)
BILIRUB SERPL-MCNC: 0.2 MG/DL (ref 0.1–1.5)
BUN SERPL-MCNC: 15 MG/DL (ref 8–22)
CALCIUM ALBUM COR SERPL-MCNC: 8.2 MG/DL (ref 8.5–10.5)
CALCIUM SERPL-MCNC: 8.3 MG/DL (ref 8.5–10.5)
CHLORIDE SERPL-SCNC: 103 MMOL/L (ref 96–112)
CHOLEST SERPL-MCNC: 169 MG/DL (ref 100–199)
CO2 SERPL-SCNC: 21 MMOL/L (ref 20–33)
CREAT SERPL-MCNC: 0.58 MG/DL (ref 0.5–1.4)
CREAT UR-MCNC: 245.61 MG/DL
ERYTHROCYTE [DISTWIDTH] IN BLOOD BY AUTOMATED COUNT: 47.8 FL (ref 35.9–50)
EST. AVERAGE GLUCOSE BLD GHB EST-MCNC: 131 MG/DL
FASTING STATUS PATIENT QL REPORTED: NORMAL
GFR SERPLBLD CREATININE-BSD FMLA CKD-EPI: 121 ML/MIN/1.73 M 2
GLOBULIN SER CALC-MCNC: 2.6 G/DL (ref 1.9–3.5)
GLUCOSE SERPL-MCNC: 101 MG/DL (ref 65–99)
HBA1C MFR BLD: 6.2 % (ref 4–5.6)
HCT VFR BLD AUTO: 42.2 % (ref 37–47)
HDLC SERPL-MCNC: 33 MG/DL
HGB BLD-MCNC: 14.2 G/DL (ref 12–16)
LDLC SERPL CALC-MCNC: 99 MG/DL
MCH RBC QN AUTO: 31.2 PG (ref 27–33)
MCHC RBC AUTO-ENTMCNC: 33.6 G/DL (ref 32.2–35.5)
MCV RBC AUTO: 92.7 FL (ref 81.4–97.8)
MICROALBUMIN UR-MCNC: 2 MG/DL
MICROALBUMIN/CREAT UR: 8 MG/G (ref 0–30)
PLATELET # BLD AUTO: 458 K/UL (ref 164–446)
PMV BLD AUTO: 9.7 FL (ref 9–12.9)
POTASSIUM SERPL-SCNC: 4.1 MMOL/L (ref 3.6–5.5)
PROT SERPL-MCNC: 6.7 G/DL (ref 6–8.2)
RBC # BLD AUTO: 4.55 M/UL (ref 4.2–5.4)
SODIUM SERPL-SCNC: 137 MMOL/L (ref 135–145)
TRIGL SERPL-MCNC: 186 MG/DL (ref 0–149)
WBC # BLD AUTO: 9.6 K/UL (ref 4.8–10.8)

## 2024-01-08 PROCEDURE — 80053 COMPREHEN METABOLIC PANEL: CPT

## 2024-01-08 PROCEDURE — 82570 ASSAY OF URINE CREATININE: CPT

## 2024-01-08 PROCEDURE — 80061 LIPID PANEL: CPT

## 2024-01-08 PROCEDURE — 85027 COMPLETE CBC AUTOMATED: CPT

## 2024-01-08 PROCEDURE — 83036 HEMOGLOBIN GLYCOSYLATED A1C: CPT

## 2024-01-08 PROCEDURE — 36415 COLL VENOUS BLD VENIPUNCTURE: CPT

## 2024-01-08 PROCEDURE — 82043 UR ALBUMIN QUANTITATIVE: CPT

## 2024-01-08 PROCEDURE — 82306 VITAMIN D 25 HYDROXY: CPT

## 2024-01-10 ENCOUNTER — OFFICE VISIT (OUTPATIENT)
Dept: MEDICAL GROUP | Facility: MEDICAL CENTER | Age: 34
End: 2024-01-10
Payer: COMMERCIAL

## 2024-01-10 VITALS
BODY MASS INDEX: 42.17 KG/M2 | TEMPERATURE: 98 F | WEIGHT: 247 LBS | HEART RATE: 76 BPM | DIASTOLIC BLOOD PRESSURE: 80 MMHG | OXYGEN SATURATION: 96 % | HEIGHT: 64 IN | RESPIRATION RATE: 18 BRPM | SYSTOLIC BLOOD PRESSURE: 112 MMHG

## 2024-01-10 DIAGNOSIS — E66.01 MORBID OBESITY WITH BMI OF 40.0-44.9, ADULT (HCC): ICD-10-CM

## 2024-01-10 DIAGNOSIS — E55.9 VITAMIN D DEFICIENCY: ICD-10-CM

## 2024-01-10 DIAGNOSIS — E11.9 TYPE 2 DIABETES MELLITUS WITHOUT COMPLICATION, WITHOUT LONG-TERM CURRENT USE OF INSULIN (HCC): ICD-10-CM

## 2024-01-10 DIAGNOSIS — E03.8 HYPOTHYROIDISM DUE TO HASHIMOTO'S THYROIDITIS: ICD-10-CM

## 2024-01-10 DIAGNOSIS — E28.2 PCOS (POLYCYSTIC OVARIAN SYNDROME): Chronic | ICD-10-CM

## 2024-01-10 DIAGNOSIS — E06.3 HYPOTHYROIDISM DUE TO HASHIMOTO'S THYROIDITIS: ICD-10-CM

## 2024-01-10 DIAGNOSIS — E78.2 MIXED HYPERLIPIDEMIA: ICD-10-CM

## 2024-01-10 PROCEDURE — 3079F DIAST BP 80-89 MM HG: CPT | Performed by: FAMILY MEDICINE

## 2024-01-10 PROCEDURE — 99214 OFFICE O/P EST MOD 30 MIN: CPT | Performed by: FAMILY MEDICINE

## 2024-01-10 PROCEDURE — 3074F SYST BP LT 130 MM HG: CPT | Performed by: FAMILY MEDICINE

## 2024-01-10 ASSESSMENT — PATIENT HEALTH QUESTIONNAIRE - PHQ9
CLINICAL INTERPRETATION OF PHQ2 SCORE: 4
SUM OF ALL RESPONSES TO PHQ QUESTIONS 1-9: 6
5. POOR APPETITE OR OVEREATING: 0 - NOT AT ALL

## 2024-01-10 ASSESSMENT — FIBROSIS 4 INDEX: FIB4 SCORE: 0.21

## 2024-01-11 NOTE — PROGRESS NOTES
Diabetes Focused Exam:    Chief Complaint   Patient presents with    Diabetes Mellitus    Dyslipidemia      Subjective:   HPI  Minnie Edward is a 33 y.o. female who presents for follow up of chronic conditions of diabetes mellitus and hyperlipidemia. She indicates that she is feeling well and denies any symptoms referable to the above diagnoses. Specifically denies chest pain, palpitations, dyspnea, orthopnea, PND or peripheral edema. Also denies polyuria, polydipsia, urinary complaints, abdominal complaints, myalgias, numbness, weakness or other related symptoms.     She is tolerating the atorvastatin.      The lexapro is well tolerated.    The patient is not taking ASA, he is taking all other medications as prescribed. Patient denies any side effects of medication.  DM: A1c goal <7  Glucose monitoring frequency: not checking  Hypoglycemic episodes none  Diabetic complications: none  ACR Albumin/Creatinine Ratio goal <30   HTN: Blood pressure goal <140/<80. Currently Rx ACE/ARB: Not Indicated  Hyperlipidemia:Cholesterol goal LDL <100, total/HDL <5. Currently Rx Statin: Yes  Last eye exam is scheduled for March   Denies visual blurring, double vision, eye pain and floaters  Last monofilament foot exam: today   Denies foot pain, numbness, calluses, ulcers    See medications and orders placed in encounter report.  Past medical history, family history, social history reviewed and updated as documented in medical record.  Current medications including changes today:  Current Outpatient Medications   Medication Sig Dispense Refill    sumatriptan (IMITREX) 100 MG tablet 1 tab at headache onset; repeat in 1 hour prn 9 Tablet 6    naproxen sodium (ANAPROX DS) 550 MG tablet 1-2 tab headache onset; repeat in 4-6 hours prn. 45 Tablet 1    metoclopramide (REGLAN) 10 MG Tab 1-3 tab at headache/nausea onset; repeat in 4-6 hours prn 45 Tablet 1    azelastine (ASTELIN) 137 MCG/SPRAY nasal spray Administer 1 [place]  "into affected nostril(S) 2 times a day. 30 mL 3    albuterol (PROAIR HFA) 108 (90 Base) MCG/ACT Aero Soln inhalation aerosol Inhale 2 Puffs every 6 hours as needed for Shortness of Breath. 8.5 g 6    albuterol (PROVENTIL) 2.5mg/3ml Nebu Soln solution for nebulization Take 3 mL by nebulization every four hours as needed for Shortness of Breath. 540 mL 0    omeprazole (PRILOSEC) 40 MG delayed-release capsule Take 1 Capsule by mouth every day. 90 Capsule 3    sucralfate (CARAFATE) 1 GM Tab Take 1 Tablet by mouth 4 Times a Day,Before Meals and at Bedtime. 120 Tablet 3    escitalopram (LEXAPRO) 20 MG tablet Take 1 Tablet by mouth every day. 90 Tablet 3    metFORMIN ER (GLUCOPHAGE XR) 750 MG TABLET SR 24 HR Take 1 Tablet by mouth every day. 180 Tablet 3    atorvastatin (LIPITOR) 10 MG Tab Take 1 Tablet by mouth every evening. 90 Tablet 3    levothyroxine (SYNTHROID) 50 MCG Tab Take 1 Tablet by mouth every morning on an empty stomach. 90 Tablet 3    ibuprofen (MOTRIN) 800 MG Tab TAKE 1 TABLET BY MOUTH EVERY 12 HOURS AS NEEDED FOR  HEADACHE  OR  INFLAMMATION.  TAKE  WITH  FOOD 180 Tablet 0    Budesonide, Inhalation, 180 MCG/ACT AEROSOL POWDER, BREATH ACTIVATED Inhale 1 Puff every day. 1 Each 6    Vitamin D, Cholecalciferol, 50 MCG (2000 UT) Cap Take 2,000 Units by mouth every day.       No current facility-administered medications for this visit.     Allergies: No Known Allergies   Social History     Tobacco Use    Smoking status: Never    Smokeless tobacco: Never   Vaping Use    Vaping Use: Never used   Substance Use Topics    Alcohol use: No     Alcohol/week: 0.0 oz    Drug use: No     Exercise: walks at work  Health Maintenance/Immunizations: discussed, doing well.  Not taking COVID boosters.      ROS  Pertinent  ROS findings as above. Denies chest pain or shortness of breath     Objective:     OBJECTIVE:  /80   Pulse 76   Temp 36.7 °C (98 °F)   Resp 18   Ht 1.626 m (5' 4\")   Wt 112 kg (247 lb)   SpO2 96%   " "BMI 42.40 kg/m²  Body mass index is 42.4 kg/m². BMI: severely obese  General: No apparent distress, conversant, cooperative and pleasant with the examination.  Psych: Alert and oriented x4, judgment and insight normal  Neck: No JVD or bruits, no adenopathy, supple  Thyroid: normal to inspection and palpation  Lungs: negative findings: normal respiratory rate and rhythm, lungs clear to auscultation  Heart: negative findings: regular rate and rhythm, S1 normal, S2 normal, no murmurs, clicks, or gallops  Abdomen: Soft, nontender, no hepatosplenomegaly or masses, normal bowel sounds  Skin: No rashes, no cyanosis, no lesions or ulcers  Extremities: No cyanosis clubbing or edema.   Feet are examined Monofilament testing with a 10 gram force: sensation intact: intact bilaterally  Visual Inspection: Feet without maceration, ulcers, fissures.  Pedal pulses: intact bilaterally     POC labs: No results found for: \"POCGLUCOSE\"       Last labs    Lab Results   Component Value Date/Time    CHOLSTRLTOT 169 01/08/2024 07:39 AM    LDL 99 01/08/2024 07:39 AM    HDL 33 (A) 01/08/2024 07:39 AM    TRIGLYCERIDE 186 (H) 01/08/2024 07:39 AM       Lab Results   Component Value Date/Time    SODIUM 137 01/08/2024 07:39 AM    POTASSIUM 4.1 01/08/2024 07:39 AM    GLUCOSE 101 (H) 01/08/2024 07:39 AM    BUNCREATRAT 15.7 03/07/2022 09:57 AM    BUNCREATRAT 16 04/28/2021 07:12 AM    BUNCREATRAT 16 03/24/2011 08:02 AM    GLOMRATE >59 03/24/2011 08:02 AM     Lab Results   Component Value Date/Time    HBA1C 6.2 (H) 01/08/2024 07:39 AM    HBA1C 6.6 (H) 06/26/2023 12:02 PM    HBA1C 6.3 (H) 12/14/2022 09:44 AM     Lab Results   Component Value Date/Time    MALBCRT 8 01/08/2024 07:38 AM    MICROALBUR 2.0 01/08/2024 07:38 AM          Assessment/Plan:   Medications, refills, and referrals per orders.   1. Type 2 diabetes mellitus without complication, without long-term current use of insulin (HCC)  Diabetic Monofilament Lower Extremity Exam    Comp Metabolic " Panel    CBC WITHOUT DIFFERENTIAL    TSH    Lipid Profile    HEMOGLOBIN A1C      2. PCOS (Polycystic Ovarian Syndrome)  HEMOGLOBIN A1C      3. Mixed hyperlipidemia  Comp Metabolic Panel    Lipid Profile      4. Hypothyroidism due to Hashimoto's thyroiditis  TSH      5. Vitamin D deficiency        6. Morbid obesity with BMI of 40.0-44.9, adult (HCC)          DM2 A1c is at goal   Patient to monitor sugars: not checking  Discussed diet, exercise, disease management and weight loss goals.   Education and advise provided today:All medications, side effects and compliance (discussed carefully)  Annual eye examinations at Ophthalmology  Diabetic diet discussed in detail, written exchange diet given  Foot care discussed and Podiatry visits  Glycohemoglobin and other lab monitoring  Labs immediately prior to next visit  Long term diabetic complications  Low cholesterol diet, weight control and daily exercise    Followup:   Do labs and RTC 6 months, sooner should new symptoms or problems arise.

## 2024-01-16 DIAGNOSIS — F41.8 SITUATIONAL ANXIETY: ICD-10-CM

## 2024-01-17 RX ORDER — ALPRAZOLAM 0.25 MG/1
TABLET ORAL
Qty: 21 TABLET | Refills: 0 | Status: SHIPPED | OUTPATIENT
Start: 2024-01-17 | End: 2024-01-24

## 2024-01-18 NOTE — TELEPHONE ENCOUNTER
Alprazolam renewal is requested.  Patient uses episodically.  She has situational anxiety.  No adverse reactions reported or observed.  Patient seen last week.  PDMP review shows no inconsistencies.  The alprazolam is renewed.

## 2024-01-21 DIAGNOSIS — F41.8 SITUATIONAL ANXIETY: ICD-10-CM

## 2024-01-21 DIAGNOSIS — Z63.4 BEREAVEMENT: ICD-10-CM

## 2024-01-21 DIAGNOSIS — E28.2 PCOS (POLYCYSTIC OVARIAN SYNDROME): Chronic | ICD-10-CM

## 2024-01-21 DIAGNOSIS — E11.9 TYPE 2 DIABETES MELLITUS WITHOUT COMPLICATION, WITHOUT LONG-TERM CURRENT USE OF INSULIN (HCC): ICD-10-CM

## 2024-01-21 SDOH — SOCIAL STABILITY - SOCIAL INSECURITY: DISSAPEARANCE AND DEATH OF FAMILY MEMBER: Z63.4

## 2024-01-22 RX ORDER — ESCITALOPRAM OXALATE 20 MG/1
20 TABLET ORAL DAILY
Qty: 90 TABLET | Refills: 3 | Status: SHIPPED | OUTPATIENT
Start: 2024-01-22

## 2024-01-22 RX ORDER — METFORMIN HYDROCHLORIDE 750 MG/1
750 TABLET, EXTENDED RELEASE ORAL DAILY
Qty: 180 TABLET | Refills: 3 | Status: SHIPPED | OUTPATIENT
Start: 2024-01-22

## 2024-01-22 NOTE — TELEPHONE ENCOUNTER
Received request via: Pharmacy    Was the patient seen in the last year in this department? Yes    Does the patient have an active prescription (recently filled or refills available) for medication(s) requested? No    Pharmacy Name: [facility] [address] [place] [address], [place] - [address] [[id number]]     Does the patient have shelter Plus and need 100 day supply (blood pressure, diabetes and cholesterol meds only)? Patient does not have SCP

## 2024-02-26 ENCOUNTER — OFFICE VISIT (OUTPATIENT)
Dept: MEDICAL GROUP | Facility: MEDICAL CENTER | Age: 34
End: 2024-02-26
Payer: COMMERCIAL

## 2024-02-26 VITALS
SYSTOLIC BLOOD PRESSURE: 114 MMHG | RESPIRATION RATE: 18 BRPM | WEIGHT: 247 LBS | OXYGEN SATURATION: 96 % | BODY MASS INDEX: 42.17 KG/M2 | DIASTOLIC BLOOD PRESSURE: 80 MMHG | HEIGHT: 64 IN | TEMPERATURE: 98 F | HEART RATE: 92 BPM

## 2024-02-26 DIAGNOSIS — H91.91 HEARING LOSS OF RIGHT EAR, UNSPECIFIED HEARING LOSS TYPE: ICD-10-CM

## 2024-02-26 DIAGNOSIS — F41.9 CHRONIC ANXIETY: ICD-10-CM

## 2024-02-26 DIAGNOSIS — R21 RASH OF GENITAL AREA: ICD-10-CM

## 2024-02-26 PROCEDURE — 3079F DIAST BP 80-89 MM HG: CPT | Performed by: FAMILY MEDICINE

## 2024-02-26 PROCEDURE — 3074F SYST BP LT 130 MM HG: CPT | Performed by: FAMILY MEDICINE

## 2024-02-26 PROCEDURE — 99214 OFFICE O/P EST MOD 30 MIN: CPT | Performed by: FAMILY MEDICINE

## 2024-02-26 RX ORDER — CLOBETASOL PROPIONATE 0.5 MG/G
CREAM TOPICAL
Qty: 30 G | Refills: 0 | Status: SHIPPED | OUTPATIENT
Start: 2024-02-26

## 2024-02-26 RX ORDER — HYDROXYZINE HYDROCHLORIDE 10 MG/1
10 TABLET, FILM COATED ORAL 3 TIMES DAILY PRN
Qty: 90 TABLET | Refills: 6 | Status: SHIPPED | OUTPATIENT
Start: 2024-02-26

## 2024-02-26 ASSESSMENT — FIBROSIS 4 INDEX: FIB4 SCORE: 0.21

## 2024-02-26 NOTE — LETTER
February 26, 2024        Patient: Minnie Edward   YOB: 1990   Date of Visit: 2/26/2024           To Whom It May Concern:    It is my medical opinion that Minnie Edward has the following medical problems;     has a past medical history of Asthma, Elevated glycohemoglobin, Frequent headaches, History of 2019 novel coronavirus disease (COVID-19) (03/03/2022), History of chickenpox (2011), Impaired glucose tolerance, Menometrorrhagia (05/03/2019), Migraine without aura and without status migrainosus, not intractable (12/20/2016), Mixed hyperlipidemia (03/03/2022), Moderate persistent asthma without complication (08/03/2022), Morbid obesity with BMI of 40.0-44.9, adult ([facility]) (03/03/2022), PCOS (polycystic ovarian syndrome) (12/15/2009), Thyromegaly (12/20/2016), and Type 2 diabetes mellitus without complication, without long-term current use of insulin ([facility]) (03/03/2022).    If you have any questions or concerns, please don't hesitate to call.    Sincerely,        Rubens Gaspar M.D.    [facility] [address] [place] [address]  [place] [id number] [phone] (Phone)  [phone] (Fax)

## 2024-02-27 NOTE — PROGRESS NOTES
Chief Complaint   Patient presents with    Follow-Up    Rash    Vaginal Itching     X1wk     Depression    Anxiety    Hearing Problem     Referral        Subjective:     HPI:   Minnie Edward presents today with the followin. Rash of genital area  She was shaving as she normally does.  Now has thickening and itching.  No erythema, vesicles, pustules or discharge appreciated.  Trial of clobetasol cream.      2. Chronic anxiety  The lexapro is helping the depression quite a bit but the anxiety remains.  Discussed using hydroxyzine 10 mg as needed.  Denies thoughts of self harm. Able to continue working full time.      3. Hearing loss of right ear, unspecified hearing loss type  Had ear drum damage as a child.  Has had progressively worsening right hearing issues.          Patient Active Problem List    Diagnosis Date Noted    Post viral asthma 10/18/2022    Moderate persistent asthma without complication 2022    Type 2 diabetes mellitus without complication, without long-term current use of insulin ([facility]) 2022    Morbid obesity with BMI of 40.0-44.9, adult ([facility]) 2022    Mixed hyperlipidemia 2022    Situational anxiety 2019    Menometrorrhagia 2019    Vitamin D deficiency 2017    Thyromegaly 2016    Migraine without aura and without status migrainosus, not intractable 2016    Left-sided low back pain with left-sided sciatica 10/02/2015    Seasonal allergies 2014    PCOS (Polycystic Ovarian Syndrome) 12/15/2009       Current medicines (including changes today)  Current Outpatient Medications   Medication Sig Dispense Refill    clobetasol (TEMOVATE) 0.05 % Cream Apply to affected area bid 30 g 0    hydrOXYzine HCl (ATARAX) 10 MG Tab Take 1 Tablet by mouth 3 times a day as needed for Anxiety. 90 Tablet 6    escitalopram (LEXAPRO) 20 MG tablet Take 1 Tablet by mouth every day. 90 Tablet 3    metFORMIN ER (GLUCOPHAGE XR) 750 MG TABLET SR 24 HR  "Take 1 Tablet by mouth every day. 180 Tablet 3    sumatriptan (IMITREX) 100 MG tablet 1 tab at headache onset; repeat in 1 hour prn 9 Tablet 6    naproxen sodium (ANAPROX DS) 550 MG tablet 1-2 tab headache onset; repeat in 4-6 hours prn. 45 Tablet 1    metoclopramide (REGLAN) 10 MG Tab 1-3 tab at headache/nausea onset; repeat in 4-6 hours prn 45 Tablet 1    azelastine (ASTELIN) 137 MCG/SPRAY nasal spray Administer 1 Spray into affected nostril(S) 2 times a day. 30 mL 3    albuterol (PROAIR HFA) 108 (90 Base) MCG/ACT Aero Soln inhalation aerosol Inhale 2 Puffs every 6 hours as needed for Shortness of Breath. 8.5 g 6    albuterol (PROVENTIL) 2.5mg/3ml Nebu Soln solution for nebulization Take 3 mL by nebulization every four hours as needed for Shortness of Breath. 540 mL 0    omeprazole (PRILOSEC) 40 MG delayed-release capsule Take 1 Capsule by mouth every day. 90 Capsule 3    sucralfate (CARAFATE) 1 GM Tab Take 1 Tablet by mouth 4 Times a Day,Before Meals and at Bedtime. 120 Tablet 3    atorvastatin (LIPITOR) 10 MG Tab Take 1 Tablet by mouth every evening. 90 Tablet 3    levothyroxine (SYNTHROID) 50 MCG Tab Take 1 Tablet by mouth every morning on an empty stomach. 90 Tablet 3    ibuprofen (MOTRIN) 800 MG Tab TAKE 1 TABLET BY MOUTH EVERY 12 HOURS AS NEEDED FOR  HEADACHE  OR  INFLAMMATION.  TAKE  WITH  FOOD 180 Tablet 0    Budesonide, Inhalation, 180 MCG/ACT AEROSOL POWDER, BREATH ACTIVATED Inhale 1 Puff every day. 1 Each 6    Vitamin D, Cholecalciferol, 50 MCG (2000 UT) Cap Take 2,000 Units by mouth every day.       No current facility-administered medications for this visit.       No Known Allergies    ROS: As per HPI  denies chest pain or shortness of breath.       Objective:     /80   Pulse 92   Temp 36.7 °C (98 °F)   Resp 18   Ht 1.626 m (5' 4\")   Wt 112 kg (247 lb)   SpO2 96%  Body mass index is 42.4 kg/m².    Physical Exam:  Constitutional: Well-developed and well-nourished. Not diaphoretic. No " distress. Lucid and fluent.  Skin: Skin is warm and dry. Labia majora rash on left, thickened skin, excoriation is present, mild.  Head: Atraumatic without lesions.  Eyes: Conjunctivae and extraocular motions are normal. Pupils are equal, round, and reactive to light. No scleral icterus.   Ears:  External ears unremarkable. Tympanic membranes clear and intact bilaterally though difficult to see due to curve in canals.  No significant wax.  External canals normal.  Neck: Supple, trachea midline. No thyromegaly present. No cervical or supraclavicular lymphadenopathy. No JVD or carotid bruits appreciated  Cardiovascular: Regular rate and rhythm.  Normal S1, S2 without murmur appreciated.  Chest: Effort normal. Clear to auscultation throughout. No adventitious sounds.   Extremities: No cyanosis, clubbing, erythema, nor edema.   Neurological: Alert and oriented x 3.  Psychiatric:  Behavior, mood, and affect are appropriate.       Assessment and Plan:     33 y.o. female with the following issues:    1. Rash of genital area  clobetasol (TEMOVATE) 0.05 % Cream      2. Chronic anxiety  hydrOXYzine HCl (ATARAX) 10 MG Tab      3. Hearing loss of right ear, unspecified hearing loss type  Referral to Audiology        Needs a letter listing her medical problems for legal purposes.  Letter provided.    Followup: Return in about 6 months (around 8/26/2024), or if symptoms worsen or fail to improve.

## 2024-03-08 DIAGNOSIS — J45.30 MILD PERSISTENT ASTHMA WITHOUT COMPLICATION: ICD-10-CM

## 2024-03-08 RX ORDER — ALBUTEROL SULFATE 90 UG/1
2 AEROSOL, METERED RESPIRATORY (INHALATION) EVERY 6 HOURS PRN
Qty: 8.5 G | Refills: 6 | Status: SHIPPED | OUTPATIENT
Start: 2024-03-08

## 2024-04-02 DIAGNOSIS — F41.8 SITUATIONAL ANXIETY: ICD-10-CM

## 2024-04-02 RX ORDER — ALPRAZOLAM 0.25 MG/1
0.25 TABLET ORAL 3 TIMES DAILY PRN
Qty: 21 TABLET | Refills: 0 | Status: SHIPPED | OUTPATIENT
Start: 2024-04-02 | End: 2024-04-09

## 2024-04-02 NOTE — PROGRESS NOTES
Patient called saying that her father had passed away.  I know he had gone to [place] or was going to [place] to visit.  I have asked her for details.  Patient is very anxious and sad about his sudden passing.  She asked for renewal of her alprazolam.  She was last seen in the office in February and so is within the required timeframe.  PDMP review shows no inconsistencies.  Renewal is placed.

## 2024-04-19 ENCOUNTER — OFFICE VISIT (OUTPATIENT)
Dept: MEDICAL GROUP | Facility: MEDICAL CENTER | Age: 34
End: 2024-04-19
Payer: COMMERCIAL

## 2024-04-19 VITALS
SYSTOLIC BLOOD PRESSURE: 126 MMHG | OXYGEN SATURATION: 95 % | WEIGHT: 257.94 LBS | TEMPERATURE: 99.1 F | BODY MASS INDEX: 44.04 KG/M2 | RESPIRATION RATE: 16 BRPM | DIASTOLIC BLOOD PRESSURE: 58 MMHG | HEART RATE: 65 BPM | HEIGHT: 64 IN

## 2024-04-19 DIAGNOSIS — F41.8 SITUATIONAL ANXIETY: ICD-10-CM

## 2024-04-19 DIAGNOSIS — E28.2 PCOS (POLYCYSTIC OVARIAN SYNDROME): Chronic | ICD-10-CM

## 2024-04-19 DIAGNOSIS — F51.04 PSYCHOPHYSIOLOGICAL INSOMNIA: ICD-10-CM

## 2024-04-19 DIAGNOSIS — J45.40 MODERATE PERSISTENT ASTHMA WITHOUT COMPLICATION: ICD-10-CM

## 2024-04-19 DIAGNOSIS — F43.21 GRIEF: ICD-10-CM

## 2024-04-19 DIAGNOSIS — E66.01 MORBID OBESITY WITH BMI OF 40.0-44.9, ADULT (HCC): ICD-10-CM

## 2024-04-19 PROCEDURE — 3074F SYST BP LT 130 MM HG: CPT | Performed by: FAMILY MEDICINE

## 2024-04-19 PROCEDURE — 99213 OFFICE O/P EST LOW 20 MIN: CPT | Performed by: FAMILY MEDICINE

## 2024-04-19 PROCEDURE — 3078F DIAST BP <80 MM HG: CPT | Performed by: FAMILY MEDICINE

## 2024-04-19 RX ORDER — ALPRAZOLAM 0.5 MG/1
0.5 TABLET ORAL 3 TIMES DAILY PRN
Qty: 42 TABLET | Refills: 0 | Status: SHIPPED | OUTPATIENT
Start: 2024-04-19 | End: 2024-05-04

## 2024-04-19 RX ORDER — CEPHALEXIN 500 MG/1
CAPSULE ORAL
COMMUNITY
Start: 2024-03-18 | End: 2024-04-19

## 2024-04-19 RX ORDER — MEDROXYPROGESTERONE ACETATE 10 MG/1
TABLET ORAL
COMMUNITY
Start: 2024-03-18

## 2024-04-19 ASSESSMENT — ANXIETY QUESTIONNAIRES
2. NOT BEING ABLE TO STOP OR CONTROL WORRYING: NEARLY EVERY DAY
4. TROUBLE RELAXING: NEARLY EVERY DAY
6. BECOMING EASILY ANNOYED OR IRRITABLE: NEARLY EVERY DAY
5. BEING SO RESTLESS THAT IT IS HARD TO SIT STILL: NEARLY EVERY DAY
3. WORRYING TOO MUCH ABOUT DIFFERENT THINGS: NEARLY EVERY DAY
GAD7 TOTAL SCORE: 21
1. FEELING NERVOUS, ANXIOUS, OR ON EDGE: NEARLY EVERY DAY
7. FEELING AFRAID AS IF SOMETHING AWFUL MIGHT HAPPEN: NEARLY EVERY DAY

## 2024-04-19 ASSESSMENT — PATIENT HEALTH QUESTIONNAIRE - PHQ9
5. POOR APPETITE OR OVEREATING: 3 - NEARLY EVERY DAY
CLINICAL INTERPRETATION OF PHQ2 SCORE: 6
SUM OF ALL RESPONSES TO PHQ QUESTIONS 1-9: 21

## 2024-04-19 ASSESSMENT — FIBROSIS 4 INDEX: FIB4 SCORE: 0.22

## 2024-04-19 NOTE — PROGRESS NOTES
Chief Complaint   Patient presents with    Stress     Grief, dad passed away from heart attack       Nausea     Dizzy, since dad passed away     Asthma     Attack randomly, not normal for pt        Subjective:     HPI:   Minnie Edward presents today with the followin. Grief/Situational anxiety/Psychophysiological insomnia  Patient continues to have significant anxiety and stress over the sudden death of her father while he was staying for a few months in [place].  She has been taking 0.25 alprazolam which gives her 2 to 3 hours of sleep and does help her relax a little but does not seem to be sufficient.  After discussion and review of the PDMP I have increased the alprazolam dose to 0.5 mg.  PDMP review shows no inconsistencies.    2. Moderate persistent asthma without complication  Patient is having significant increase in her asthma symptoms.  We are having very high pollen and it is quite windy the last few weeks.  This may be triggering but also that stress may be triggering as well.  She is using rescue inhalers only.  Worsening symptoms, situation discussed, budesonide renewed    3. Morbid obesity with BMI of 40.0-44.9, adult ([facility])  She finds she is stress eating.  She is trying to be a little more active and make better choices but it is difficult.  I have asked her to not stress about this too much right now.    4. PCOS (Polycystic Ovarian Syndrome)  Patient does have PCOS and is seeing endocrinology who is addressing.        Patient Active Problem List    Diagnosis Date Noted    Grief 2024    Psychophysiological insomnia 2024    Post viral asthma 10/18/2022    Moderate persistent asthma without complication 2022    Type 2 diabetes mellitus without complication, without long-term current use of insulin ([facility]) 2022    Morbid obesity with BMI of 40.0-44.9, adult ([facility]) 2022    Mixed hyperlipidemia 2022    Situational anxiety 2019     Menometrorrhagia 05/03/2019    Vitamin D deficiency 09/21/2017    Thyromegaly 12/20/2016    Migraine without aura and without status migrainosus, not intractable 12/20/2016    Left-sided low back pain with left-sided sciatica 10/02/2015    Seasonal allergies 09/26/2014    PCOS (Polycystic Ovarian Syndrome) 12/15/2009       Current medicines (including changes today)  Current Outpatient Medications   Medication Sig Dispense Refill    Budesonide, Inhalation, 180 MCG/ACT AEROSOL POWDER, BREATH ACTIVATED Inhale 1 Puff every day. 1 Each 6    ALPRAZolam (XANAX) 0.5 MG Tab Take 1 Tablet by mouth 3 times a day as needed for Sleep or Anxiety for up to 15 days. 42 tablets is a 15 day quantity 42 Tablet 0    albuterol (PROAIR HFA) 108 (90 Base) MCG/ACT Aero Soln inhalation aerosol Inhale 2 Puffs every 6 hours as needed for Shortness of Breath. 8.5 g 6    hydrOXYzine HCl (ATARAX) 10 MG Tab Take 1 Tablet by mouth 3 times a day as needed for Anxiety. 90 Tablet 6    escitalopram (LEXAPRO) 20 MG tablet Take 1 Tablet by mouth every day. 90 Tablet 3    metFORMIN ER (GLUCOPHAGE XR) 750 MG TABLET SR 24 HR Take 1 Tablet by mouth every day. 180 Tablet 3    sumatriptan (IMITREX) 100 MG tablet 1 tab at headache onset; repeat in 1 hour prn 9 Tablet 6    naproxen sodium (ANAPROX DS) 550 MG tablet 1-2 tab headache onset; repeat in 4-6 hours prn. 45 Tablet 1    metoclopramide (REGLAN) 10 MG Tab 1-3 tab at headache/nausea onset; repeat in 4-6 hours prn 45 Tablet 1    azelastine (ASTELIN) 137 MCG/SPRAY nasal spray Administer 1 Spray into affected nostril(S) 2 times a day. 30 mL 3    albuterol (PROVENTIL) 2.5mg/3ml Nebu Soln solution for nebulization Take 3 mL by nebulization every four hours as needed for Shortness of Breath. 540 mL 0    omeprazole (PRILOSEC) 40 MG delayed-release capsule Take 1 Capsule by mouth every day. 90 Capsule 3    sucralfate (CARAFATE) 1 GM Tab Take 1 Tablet by mouth 4 Times a Day,Before Meals and at Bedtime. 120 Tablet  "3    atorvastatin (LIPITOR) 10 MG Tab Take 1 Tablet by mouth every evening. 90 Tablet 3    levothyroxine (SYNTHROID) 50 MCG Tab Take 1 Tablet by mouth every morning on an empty stomach. 90 Tablet 3    ibuprofen (MOTRIN) 800 MG Tab TAKE 1 TABLET BY MOUTH EVERY 12 HOURS AS NEEDED FOR  HEADACHE  OR  INFLAMMATION.  TAKE  WITH  FOOD 180 Tablet 0    Vitamin D, Cholecalciferol, 50 MCG (2000 UT) Cap Take 2,000 Units by mouth every day.      medroxyPROGESTERone (PROVERA) 10 MG Tab TAKE 1 TABLET BY MOUTH WITH FOOD ONCE DAILY FOR 10 DAYS, EVERY OTHER MONTH (Patient not taking: Reported on 4/19/2024)       No current facility-administered medications for this visit.       No Known Allergies    ROS: As per HPI       Objective:     /58   Pulse 65   Temp 37.3 °C (99.1 °F) (Temporal)   Resp 16   Ht 1.626 m (5' 4\")   Wt 117 kg (257 lb 15 oz)   SpO2 95%  Body mass index is 44.27 kg/m².    Physical Exam:  Constitutional: Well-developed and well-nourished. Not diaphoretic. No distress. Lucid and fluent. Fatigued and tearful.  Skin: Skin is warm and dry. No rash noted.  Head: Atraumatic without lesions.  Eyes: Conjunctivae are normal.  Her extraocular motions show vertical nystagmus.  Pupils are equal, round, and reactive to light. No scleral icterus.   Ears:  External ears unremarkable.  Neck: Supple, trachea midline. No thyromegaly present. No cervical or supraclavicular lymphadenopathy. No JVD or carotid bruits appreciated  Cardiovascular: Regular rate and rhythm.  Normal S1, S2 without murmur appreciated.  Chest: Effort normal. Clear to auscultation throughout. No adventitious sounds.   Extremities: No cyanosis, clubbing, erythema, nor edema.   Neurological: Alert and oriented x 3. No tremor.    Psychiatric:  Behavior, mood, and affect are appropriate.    Her recent prolactin result was high at 43.  She is having breast leakage, has endocrinology referral.    Assessment and Plan:     34 y.o. female with the following " issues:    1. Grief  ALPRAZolam (XANAX) 0.5 MG Tab      2. Psychophysiological insomnia  ALPRAZolam (XANAX) 0.5 MG Tab      3. Situational anxiety  ALPRAZolam (XANAX) 0.5 MG Tab      4. Moderate persistent asthma without complication  Budesonide, Inhalation, 180 MCG/ACT AEROSOL POWDER, BREATH ACTIVATED      5. Morbid obesity with BMI of 40.0-44.9, adult ([facility])  Patient identified as having weight management issue.  Appropriate orders and counseling given.      6. PCOS (Polycystic Ovarian Syndrome)              Followup: Return in about 3 months (around 7/19/2024), or if symptoms worsen or fail to improve.

## 2024-05-07 DIAGNOSIS — J45.31 MILD PERSISTENT ASTHMA WITH ACUTE EXACERBATION: ICD-10-CM

## 2024-05-07 DIAGNOSIS — G43.009 MIGRAINE WITHOUT AURA AND WITHOUT STATUS MIGRAINOSUS, NOT INTRACTABLE: ICD-10-CM

## 2024-05-07 RX ORDER — ALBUTEROL SULFATE 2.5 MG/3ML
2.5 SOLUTION RESPIRATORY (INHALATION) EVERY 4 HOURS PRN
Qty: 540 ML | Refills: 0 | Status: SHIPPED | OUTPATIENT
Start: 2024-05-07

## 2024-05-07 RX ORDER — NAPROXEN SODIUM 550 MG/1
TABLET ORAL
Qty: 45 TABLET | Refills: 1 | Status: SHIPPED | OUTPATIENT
Start: 2024-05-07

## 2024-05-07 NOTE — TELEPHONE ENCOUNTER
Received request via: Pharmacy    Medication Name/Dosage Naproxen Sodium     When was medication last prescribed 12/1/2024    How many refills were previously provided 1    How many Refills does he patient have left from last prescription 0    Was the patient seen in the last year in this department? Yes   Date of last office visit 12/3/2023     Per last Neurology Office Visit, when was the date of next follow up visit set for?                          6 mths   Date of office visit follow up request 6/1/2024     Does the patient have an upcoming appointment? Yes   If yes, when 6/7/2024             If no, schedule appointment     Does the patient have [facility] Plus and need 100 day supply (blood pressure, diabetes and cholesterol meds only)? Medication is not for cholesterol, blood pressure or diabetes

## 2024-05-07 NOTE — TELEPHONE ENCOUNTER
Received request via: Pharmacy    Was the patient seen in the last year in this department? Yes    Does the patient have an active prescription (recently filled or refills available) for medication(s) requested? No    Pharmacy Name:   [facility] [address] [place] [address], [place] - [address]          Does the patient have snf Plus and need 100 day supply (blood pressure, diabetes and cholesterol meds only)? Patient does not have SCP

## 2024-06-07 ENCOUNTER — TELEPHONE (OUTPATIENT)
Dept: NEUROLOGY | Facility: MEDICAL CENTER | Age: 34
End: 2024-06-07
Payer: COMMERCIAL

## 2024-06-07 ENCOUNTER — OFFICE VISIT (OUTPATIENT)
Dept: NEUROLOGY | Facility: MEDICAL CENTER | Age: 34
End: 2024-06-07
Attending: PSYCHIATRY & NEUROLOGY
Payer: COMMERCIAL

## 2024-06-07 VITALS
DIASTOLIC BLOOD PRESSURE: 70 MMHG | OXYGEN SATURATION: 95 % | WEIGHT: 253.53 LBS | BODY MASS INDEX: 43.28 KG/M2 | SYSTOLIC BLOOD PRESSURE: 112 MMHG | HEART RATE: 60 BPM | RESPIRATION RATE: 16 BRPM | HEIGHT: 64 IN | TEMPERATURE: 98.9 F

## 2024-06-07 DIAGNOSIS — G43.009 MIGRAINE WITHOUT AURA AND WITHOUT STATUS MIGRAINOSUS, NOT INTRACTABLE: Primary | ICD-10-CM

## 2024-06-07 PROCEDURE — 3078F DIAST BP <80 MM HG: CPT | Performed by: PSYCHIATRY & NEUROLOGY

## 2024-06-07 PROCEDURE — 3074F SYST BP LT 130 MM HG: CPT | Performed by: PSYCHIATRY & NEUROLOGY

## 2024-06-07 PROCEDURE — 99213 OFFICE O/P EST LOW 20 MIN: CPT | Performed by: PSYCHIATRY & NEUROLOGY

## 2024-06-07 RX ORDER — NAPROXEN SODIUM 550 MG/1
TABLET ORAL
Qty: 45 TABLET | Refills: 5 | Status: SHIPPED | OUTPATIENT
Start: 2024-06-07

## 2024-06-07 RX ORDER — METOCLOPRAMIDE 10 MG/1
TABLET ORAL
Qty: 45 TABLET | Refills: 5 | Status: SHIPPED | OUTPATIENT
Start: 2024-06-07

## 2024-06-07 RX ORDER — SUMATRIPTAN 100 MG/1
TABLET, FILM COATED ORAL
Qty: 27 TABLET | Refills: 3 | Status: SHIPPED | OUTPATIENT
Start: 2024-06-07

## 2024-06-07 ASSESSMENT — ENCOUNTER SYMPTOMS
HEADACHES: 1
NERVOUS/ANXIOUS: 1
MEMORY LOSS: 0

## 2024-06-07 ASSESSMENT — FIBROSIS 4 INDEX: FIB4 SCORE: 0.22

## 2024-06-07 NOTE — TELEPHONE ENCOUNTER
Received Refill PA request via [facility]  for sumatriptan (IMITREX) 100 MG tablet . (Quantity:27, Day Supply:90)     Insurance: [employer]  Member ID:  [id number]  BIN: [id number]  PCN: [id number]  Group: [id number]     Ran Test claim via [place] & medication  REFILL TOO SOON UNTIL 07/21/2024. LAST FILL DT 05/17/2024 @[facility]

## 2024-06-07 NOTE — PROGRESS NOTES
"Bayron Edward is a 34 y.o. female who presents for follow-up, with a history of migraine without aura.     JESSEE Hartman states that her headaches actually have been doing quite well.  She has been using Imitrex/Anaprox DS/Reglan as a drug cocktail for rescue, typically a single dose is all that she needs, but on bad days she might repeat the dose.  In that case the headache does not recur on the second day.  She was getting the point where she would go 1 month without a headache at all, but then in April, around [facility], her father had fallen from a ladder at his house in [place], was found 3 days later having passed.  Since then, needless to say, the headaches have increased back to a once or twice in a week frequency though medication still work consistently.  Things are toning down.    Medical, surgical and family histories are reviewed, there are no new drug allergies.  She is on Imitrex 100 mg/Anaprox  mg/Reglan 10 mg taken together as a single dose, repeated if needed, he is also on Lexapro 20 mg daily since her father's passing, Atarax, Prilosec, Proventil and albuterol inhalers, Lipitor, Glucophage, Synthroid, vitamin D with calcium and Motrin occasionally.    Review of Systems   Neurological:  Positive for headaches.   Psychiatric/Behavioral:  Negative for memory loss. The patient is nervous/anxious.    All other systems reviewed and are negative.    Objective     /70 (BP Location: Right arm, Patient Position: Sitting, BP Cuff Size: Adult)   Pulse 60   Temp 37.2 °C (98.9 °F) (Temporal)   Resp 16   Ht 1.626 m (5' 4\")   Wt 115 kg (253 lb 8.5 oz)   SpO2 95%   BMI 43.52 kg/m²      Physical Exam    She appears in no acute distress.  Vital signs are stable.  There is no malar rash or temporal tenderness.  There is no jaw claudication.  Her neck is supple, range of motion is full.  Cardiac evaluation is unremarkable.     Neurological Exam    Fully oriented, there " is no aphasia, apraxia, or inattention.    PERRLA/EOMI, visual fields are grossly full, facial movements are symmetric, sensory exam is intact to temperature.  The tongue and uvula are midline, there is no bulbar dysfunction.  Jaw movements are intact.  Shoulder shrug and head rotation are normal.    Musculoskeletal exam reveals normal tone without tremor or drift.  Strength is intact throughout.  Reflexes are present and symmetric throughout, none are dropped.  The toes are downgoing bilaterally.    She stands easily, gait is normal and station and stride length.  There is no appendicular dystaxia.  Fine motor control is normal in all 4 extremities.    Sensory exam is intact to vibration and temperature.    Assessment & Plan     1. Migraine without aura and without status migrainosus, not intractable  She is doing well, given the stress with her father's recent, unexpected passing, it is not surprising the headaches increased but even then they have not increased to a more severe intensity.  Both of his expect things to calm down consistently over time.  There is no reason to add a maintenance therapy at this time.  She can simply follow-up in 1 year.  She can always call the office earlier if the headaches do continue to increase.    - sumatriptan (IMITREX) 100 MG tablet; 1 tab at headache onset; repeat in 1 hour prn  Dispense: 27 Tablet; Refill: 3  - naproxen sodium (ANAPROX) 550 MG tablet; TAKE 1 TO 2 TABLETS BY MOUTH AT ONSET OF HEADACHE. MAY REPEAT IN 4-6 HOURS AS NEEDED  Dispense: 45 Tablet; Refill: 5  - metoclopramide (REGLAN) 10 MG Tab; 1-3 tab at headache/nausea onset; repeat in 4-6 hours prn  Dispense: 45 Tablet; Refill: 5    Time: 20 minutes in total spent on patient care including pre-charting, record review, discussion with healthcare staff and documentation.  This includes face-to-face time for exam, review, discussion, as well as counseling and coordinating care.

## 2024-07-22 ENCOUNTER — APPOINTMENT (OUTPATIENT)
Dept: URGENT CARE | Facility: CLINIC | Age: 34
End: 2024-07-22

## 2025-06-23 ENCOUNTER — OFFICE VISIT (OUTPATIENT)
Dept: URGENT CARE | Facility: CLINIC | Age: 35
End: 2025-06-23
Payer: COMMERCIAL

## 2025-06-23 ENCOUNTER — RESULTS FOLLOW-UP (OUTPATIENT)
Dept: URGENT CARE | Facility: CLINIC | Age: 35
End: 2025-06-23

## 2025-06-23 VITALS
WEIGHT: 237 LBS | SYSTOLIC BLOOD PRESSURE: 118 MMHG | TEMPERATURE: 97.2 F | OXYGEN SATURATION: 97 % | DIASTOLIC BLOOD PRESSURE: 74 MMHG | HEIGHT: 64 IN | HEART RATE: 85 BPM | RESPIRATION RATE: 18 BRPM | BODY MASS INDEX: 40.46 KG/M2

## 2025-06-23 DIAGNOSIS — B34.9 VIRAL SYNDROME: Primary | ICD-10-CM

## 2025-06-23 DIAGNOSIS — J02.9 SORE THROAT: ICD-10-CM

## 2025-06-23 LAB
FLUAV RNA SPEC QL NAA+PROBE: NEGATIVE
FLUBV RNA SPEC QL NAA+PROBE: NEGATIVE
RSV RNA SPEC QL NAA+PROBE: NEGATIVE
S PYO DNA SPEC NAA+PROBE: NOT DETECTED
SARS-COV-2 RNA RESP QL NAA+PROBE: NEGATIVE

## 2025-06-23 PROCEDURE — 3078F DIAST BP <80 MM HG: CPT | Performed by: NURSE PRACTITIONER

## 2025-06-23 PROCEDURE — 0241U POCT CEPHEID COV-2, FLU A/B, RSV - PCR: CPT | Performed by: NURSE PRACTITIONER

## 2025-06-23 PROCEDURE — 99213 OFFICE O/P EST LOW 20 MIN: CPT | Performed by: NURSE PRACTITIONER

## 2025-06-23 PROCEDURE — 87651 STREP A DNA AMP PROBE: CPT | Performed by: NURSE PRACTITIONER

## 2025-06-23 PROCEDURE — 3074F SYST BP LT 130 MM HG: CPT | Performed by: NURSE PRACTITIONER

## 2025-06-23 RX ORDER — LORATADINE 10 MG/1
1 CAPSULE, LIQUID FILLED ORAL DAILY
Qty: 30 CAPSULE | Refills: 0 | Status: SHIPPED | OUTPATIENT
Start: 2025-06-23 | End: 2025-07-23

## 2025-06-23 RX ORDER — BENZONATATE 100 MG/1
200 CAPSULE ORAL 3 TIMES DAILY PRN
Qty: 45 CAPSULE | Refills: 0 | Status: SHIPPED | OUTPATIENT
Start: 2025-06-23 | End: 2025-07-08

## 2025-06-23 RX ORDER — PREDNISONE 10 MG/1
20 TABLET ORAL DAILY
Qty: 10 TABLET | Refills: 0 | Status: SHIPPED | OUTPATIENT
Start: 2025-06-23 | End: 2025-06-28

## 2025-06-23 RX ORDER — FLUTICASONE PROPIONATE 50 MCG
1 SPRAY, SUSPENSION (ML) NASAL DAILY
Qty: 16 G | Refills: 0 | Status: SHIPPED | OUTPATIENT
Start: 2025-06-23

## 2025-06-23 RX ORDER — ALBUTEROL SULFATE 90 UG/1
2 INHALANT RESPIRATORY (INHALATION) EVERY 6 HOURS PRN
Qty: 8.5 G | Refills: 0 | Status: SHIPPED | OUTPATIENT
Start: 2025-06-23

## 2025-06-23 ASSESSMENT — ENCOUNTER SYMPTOMS
WHEEZING: 1
SINUS PAIN: 1
VOMITING: 0
NECK PAIN: 1
HEADACHES: 1
SORE THROAT: 1
RHINORRHEA: 1
COUGH: 1
DIARRHEA: 0

## 2025-06-23 ASSESSMENT — FIBROSIS 4 INDEX: FIB4 SCORE: 0.22

## 2025-06-23 NOTE — PATIENT INSTRUCTIONS
Upper Respiratory Infection, Adult  An upper respiratory infection (URI) is a common viral infection of the nose, throat, and upper air passages that lead to the lungs. The most common type of URI is the common cold. URIs usually get better on their own, without medical treatment.  What are the causes?  A URI is caused by a virus. You may catch a virus by:  Breathing in droplets from an infected person's cough or sneeze.  Touching something that has been exposed to the virus (is contaminated) and then touching your mouth, nose, or eyes.  What increases the risk?  You are more likely to get a URI if:  You are very young or very old.  You have close contact with others, such as at work, school, or a health care facility.  You smoke.  You have long-term (chronic) heart or lung disease.  You have a weakened disease-fighting system (immune system).  You have nasal allergies or asthma.  You are experiencing a lot of stress.  You have poor nutrition.  What are the signs or symptoms?  A URI usually involves some of the following symptoms:  Runny or stuffy (congested) nose.  Cough.  Sneezing.  Sore throat.  Headache.  Fatigue.  Fever.  Loss of appetite.  Pain in your forehead, behind your eyes, and over your cheekbones (sinus pain).  Muscle aches.  Redness or irritation of the eyes.  Pressure in the ears or face.  How is this diagnosed?  This condition may be diagnosed based on your medical history and symptoms, and a physical exam. Your health care provider may use a swab to take a mucus sample from your nose (nasal swab). This sample can be tested to determine what virus is causing the illness.  How is this treated?  URIs usually get better on their own within 7-10 days. Medicines cannot cure URIs, but your health care provider may recommend certain medicines to help relieve symptoms, such as:  Over-the-counter cold medicines.  Cough suppressants. Coughing is a type of defense against infection that helps to clear the  respiratory system, so take these medicines only as recommended by your health care provider.  Fever-reducing medicines.  Follow these instructions at home:  Activity  Rest as needed.  If you have a fever, stay home from work or school until your fever is gone or until your health care provider says your URI cannot spread to other people (is no longer contagious). Your health care provider may have you wear a face mask to prevent your infection from spreading.  Relieving symptoms  Gargle with a mixture of salt and water 3-4 times a day or as needed. To make salt water, completely dissolve ½-1 tsp (3-6 g) of salt in 1 cup (237 mL) of warm water.  Use a cool-mist humidifier to add moisture to the air. This can help you breathe more easily.  Eating and drinking    Drink enough fluid to keep your urine pale yellow.  Eat soups and other clear broths.  General instructions    Take over-the-counter and prescription medicines only as told by your health care provider. These include cold medicines, fever reducers, and cough suppressants.  Do not use any products that contain nicotine or tobacco. These products include cigarettes, chewing tobacco, and vaping devices, such as e-cigarettes. If you need help quitting, ask your health care provider.  Stay away from secondhand smoke.  Stay up to date on all immunizations, including the yearly (annual) flu vaccine.  Keep all follow-up visits. This is important.  How to prevent the spread of infection to others  URIs can be contagious. To prevent the infection from spreading:  Wash your hands with soap and water for at least 20 seconds. If soap and water are not available, use hand .  Avoid touching your mouth, face, eyes, or nose.  Cough or sneeze into a tissue or your sleeve or elbow instead of into your hand or into the air.    Contact a health care provider if:  You are getting worse instead of better.  You have a fever or chills.  Your mucus is brown or red.  You have  yellow or brown discharge coming from your nose.  You have pain in your face, especially when you bend forward.  You have swollen neck glands.  You have pain while swallowing.  You have white areas in the back of your throat.  Get help right away if:  You have shortness of breath that gets worse.  You have severe or persistent:  Headache.  Ear pain.  Sinus pain.  Chest pain.  You have chronic lung disease along with any of the following:  Making high-pitched whistling sounds when you breathe, most often when you breathe out (wheezing).  Prolonged cough (more than 14 days).  Coughing up blood.  A change in your usual mucus.  You have a stiff neck.  You have changes in your:  Vision.  Hearing.  Thinking.  Mood.  These symptoms may be an emergency. Get help right away. Call 911.  Do not wait to see if the symptoms will go away.  Do not drive yourself to the hospital.  Summary  An upper respiratory infection (URI) is a common infection of the nose, throat, and upper air passages that lead to the lungs.  A URI is caused by a virus.  URIs usually get better on their own within 7-10 days.  Medicines cannot cure URIs, but your health care provider may recommend certain medicines to help relieve symptoms.  This information is not intended to replace advice given to you by your health care provider. Make sure you discuss any questions you have with your health care provider.  Document Revised: 07/20/2022 Document Reviewed: 07/20/2022  [employer] Patient Education © 2023 [employer]

## 2025-06-23 NOTE — LETTER
[facility]  [address]  [place] [address]     June 23, 2025    Patient: Minnie Edward   YOB: 1990   Date of Visit: 6/23/2025       To Whom It May Concern:    Minnie Edward was seen and treated in our department on 6/23/2025. Please excuse absence. It's my medical opinion that this patient would benefit from rest and recuperation. May return to work/school/ on 06/26/2025, or sooner if feeling better.      Sincerely,     NAPOLEON Vega.

## 2025-06-23 NOTE — PROGRESS NOTES
Patient/parent/guardian has consented to treatment and for use of patient information for treatment and billing purposes.  Subjective:   Minnie Edward  is a 35 y.o. female who presents for Cough (Cough , body aches and chills and chest congestion x 5 days )       URI   This is a new problem. The current episode started in the past 7 days. The problem has been gradually worsening. The maximum temperature recorded prior to her arrival was 100.4 - 100.9 F. Associated symptoms include congestion, coughing, ear pain, headaches, neck pain, a plugged ear sensation, rhinorrhea, sinus pain, sneezing, a sore throat and wheezing. Pertinent negatives include no diarrhea or vomiting. She has tried acetaminophen and decongestant for the symptoms. The treatment provided mild relief.       Review of Systems   HENT:  Positive for congestion, ear pain, rhinorrhea, sinus pain, sneezing and sore throat.    Respiratory:  Positive for cough and wheezing.    Gastrointestinal:  Negative for diarrhea and vomiting.   Musculoskeletal:  Positive for neck pain.   Neurological:  Positive for headaches.         CURRENT MEDICATIONS:  albuterol Aers  albuterol Nebu  atorvastatin Tabs  azelastine  Budesonide (Inhalation) Aepb  escitalopram  hydrOXYzine HCl Tabs  ibuprofen Tabs  levothyroxine Tabs  metFORMIN ER Tb24  metoclopramide Tabs  naproxen sodium  omeprazole  sumatriptan  Vitamin D (Cholecalciferol) Caps  Allergies:   Allergies[1]  Current Problems: Minnie Edward does not have any pertinent problems on file.  Past Surgical Hx:    Past Surgical History:   Procedure Laterality Date    OTHER ABDOMINAL SURGERY  03/2022    ovarian cyst removed and both fallopian tubes      Past Social Hx:  reports that she has never smoked. She has never used smokeless tobacco. She reports that she does not drink alcohol and does not use drugs.    Objective:   /74 (BP Location: Left arm, Patient Position: Sitting, BP Cuff Size:  "Large adult)   Pulse 85   Temp 36.2 °C (97.2 °F) (Temporal)   Resp 18   Ht 1.626 m (5' 4\")   Wt 108 kg (237 lb)   SpO2 97%   BMI 40.68 kg/m²   Physical Exam  Vitals and nursing note reviewed. Exam conducted with a chaperone present.   Constitutional:       General: She is not in acute distress.     Appearance: Normal appearance. She is normal weight. She is ill-appearing.   HENT:      Head: Normocephalic and atraumatic.      Right Ear: External ear normal. No swelling or tenderness. A middle ear effusion is present. Tympanic membrane is bulging. Tympanic membrane is not erythematous.      Left Ear: External ear normal. No swelling or tenderness. A middle ear effusion is present. Tympanic membrane is bulging. Tympanic membrane is not erythematous.      Nose: Mucosal edema, congestion and rhinorrhea present. Rhinorrhea is clear and purulent.      Right Turbinates: Swollen.      Left Turbinates: Swollen.      Right Sinus: No maxillary sinus tenderness or frontal sinus tenderness.      Left Sinus: No maxillary sinus tenderness or frontal sinus tenderness.      Mouth/Throat:      Mouth: Mucous membranes are moist.      Pharynx: Postnasal drip present. No oropharyngeal exudate or posterior oropharyngeal erythema.   Eyes:      Extraocular Movements: Extraocular movements intact.      Conjunctiva/sclera: Conjunctivae normal.      Pupils: Pupils are equal, round, and reactive to light.   Cardiovascular:      Rate and Rhythm: Normal rate and regular rhythm.      Pulses: Normal pulses.      Heart sounds: Normal heart sounds.   Pulmonary:      Effort: Pulmonary effort is normal. No respiratory distress.      Breath sounds: Normal breath sounds. No transmitted upper airway sounds. No wheezing, rhonchi or rales.   Abdominal:      Palpations: Abdomen is soft.      Tenderness: There is no abdominal tenderness.   Musculoskeletal:         General: Normal range of motion.      Cervical back: Normal range of motion and neck " supple.   Skin:     General: Skin is warm and dry.      Findings: No rash.   Neurological:      General: No focal deficit present.      Mental Status: She is alert and oriented to person, place, and time.   Psychiatric:         Mood and Affect: Mood normal.         Behavior: Behavior normal.       Results for orders placed or performed in visit on 06/23/25   POCT CEPHEID COV-2, FLU A/B, RSV - PCR    Collection Time: 06/23/25  8:22 AM   Result Value Ref Range    SARS-CoV-2 by PCR Negative Negative, Invalid    Influenza virus A RNA Negative Negative, Invalid    Influenza virus B, PCR Negative Negative, Invalid    RSV, PCR Negative Negative, Invalid   POCT CEPHEID GROUP A STREP - PCR    Collection Time: 06/23/25  8:22 AM   Result Value Ref Range    POC Group A Strep, PCR Not Detected Not Detected, Invalid       Assessment/Plan:   1. Viral syndrome  - POCT CEPHEID COV-2, FLU A/B, RSV - PCR  - benzonatate (TESSALON) 100 MG Cap; Take 2 Capsules by mouth 3 times a day as needed for Cough for up to 15 days.  Dispense: 45 Capsule; Refill: 0  - fluticasone (FLONASE) 50 MCG/ACT nasal spray; Administer 1 Spray into affected nostril(S) every day.  Dispense: 16 g; Refill: 0  - Loratadine (CLARITIN) 10 MG Cap; Take 1 Tablet by mouth every day for 30 days.  Dispense: 30 Capsule; Refill: 0  - albuterol 108 (90 Base) MCG/ACT Aero Soln inhalation aerosol; Inhale 2 Puffs every 6 hours as needed for Shortness of Breath.  Dispense: 8.5 g; Refill: 0  - predniSONE (DELTASONE) 10 MG Tab; Take 2 Tablets by mouth every day for 5 days.  Dispense: 10 Tablet; Refill: 0  - POCT CEPHEID GROUP A STREP - PCR    2. Sore throat  - POCT CEPHEID COV-2, FLU A/B, RSV - PCR  - benzonatate (TESSALON) 100 MG Cap; Take 2 Capsules by mouth 3 times a day as needed for Cough for up to 15 days.  Dispense: 45 Capsule; Refill: 0  - fluticasone (FLONASE) 50 MCG/ACT nasal spray; Administer 1 Spray into affected nostril(S) every day.  Dispense: 16 g; Refill: 0  -  Loratadine (CLARITIN) 10 MG Cap; Take 1 Tablet by mouth every day for 30 days.  Dispense: 30 Capsule; Refill: 0  - albuterol 108 (90 Base) MCG/ACT Aero Soln inhalation aerosol; Inhale 2 Puffs every 6 hours as needed for Shortness of Breath.  Dispense: 8.5 g; Refill: 0  - predniSONE (DELTASONE) 10 MG Tab; Take 2 Tablets by mouth every day for 5 days.  Dispense: 10 Tablet; Refill: 0  - POCT CEPHEID GROUP A STREP - PCR    Point-of-care viral and strep testing completed.  Will contact patient with any positive results.  Reassurance with anticipatory guidance provided regarding length of time and expected symptoms discussed. Symptom management medication sent to pharmacy with instructions for use.  Recommend adequate hydration, rest, deep breathing and coughing, ambulation as tolerated, OTC medications. Delsym OTC cough medication lasts 12 hours, might help you sleep better.  Consider Ponaris, or Nose Better nasal emollients, and saline nasal spray for nasal congestion or to prevent nasal passage dryness or allergies.  Recommend Coricidin if history of hypertension present.  Reminded to change toothbrush as well.     Clear instructions provided.  Verified patient/parent/guardian understood by having them repeated back to me. Discussed expected length of time for symptom improvement as well as when to return to clinic for reexam.  Reviewed red flags and ER precautions.  Discussed medication management options, risks and benefits, and alternatives to treatment plan agreed upon. Instructed to continue medications without changes as ordered by primary care unless aforementioned above.  Patient expresses understanding and agrees to plan of care. All questions or concerns answered. For my MDM, I have personally reviewed previous notes, and test results as pertinent to today's visit.  9:22 AM  Negative.  No change to treatment plan.  Message sent through [employer].  Please note that this dictation was created using voice  recognition software. I have made every reasonable attempt to correct obvious errors,  but there may be grammar errors, and possibly content that I did not discover before finalizing the note.   This note was electronically signed by NAPOLEON Vega.              [1] No Known Allergies